# Patient Record
Sex: MALE | Race: WHITE | Employment: UNEMPLOYED | URBAN - METROPOLITAN AREA
[De-identification: names, ages, dates, MRNs, and addresses within clinical notes are randomized per-mention and may not be internally consistent; named-entity substitution may affect disease eponyms.]

---

## 2018-04-06 ENCOUNTER — RADIANT APPOINTMENT (OUTPATIENT)
Dept: GENERAL RADIOLOGY | Facility: CLINIC | Age: 58
End: 2018-04-06
Attending: FAMILY MEDICINE
Payer: COMMERCIAL

## 2018-04-06 ENCOUNTER — OFFICE VISIT (OUTPATIENT)
Dept: FAMILY MEDICINE | Facility: CLINIC | Age: 58
End: 2018-04-06
Payer: COMMERCIAL

## 2018-04-06 VITALS
HEART RATE: 72 BPM | OXYGEN SATURATION: 97 % | BODY MASS INDEX: 21.16 KG/M2 | SYSTOLIC BLOOD PRESSURE: 154 MMHG | RESPIRATION RATE: 20 BRPM | HEIGHT: 68 IN | WEIGHT: 139.6 LBS | DIASTOLIC BLOOD PRESSURE: 74 MMHG | TEMPERATURE: 97.2 F

## 2018-04-06 DIAGNOSIS — Z23 NEED FOR VACCINATION: ICD-10-CM

## 2018-04-06 DIAGNOSIS — Z12.11 SPECIAL SCREENING FOR MALIGNANT NEOPLASMS, COLON: ICD-10-CM

## 2018-04-06 DIAGNOSIS — R06.09 DOE (DYSPNEA ON EXERTION): ICD-10-CM

## 2018-04-06 DIAGNOSIS — Z13.6 CARDIOVASCULAR SCREENING; LDL GOAL LESS THAN 130: ICD-10-CM

## 2018-04-06 DIAGNOSIS — R06.09 DOE (DYSPNEA ON EXERTION): Primary | ICD-10-CM

## 2018-04-06 DIAGNOSIS — I10 ESSENTIAL HYPERTENSION WITH GOAL BLOOD PRESSURE LESS THAN 140/90: ICD-10-CM

## 2018-04-06 DIAGNOSIS — Z72.0 TOBACCO ABUSE: ICD-10-CM

## 2018-04-06 LAB
ALBUMIN UR-MCNC: NEGATIVE MG/DL
APPEARANCE UR: CLEAR
BILIRUB UR QL STRIP: NEGATIVE
COLOR UR AUTO: YELLOW
ERYTHROCYTE [DISTWIDTH] IN BLOOD BY AUTOMATED COUNT: 12.9 % (ref 10–15)
GLUCOSE UR STRIP-MCNC: NEGATIVE MG/DL
HCT VFR BLD AUTO: 44.6 % (ref 40–53)
HGB BLD-MCNC: 15.4 G/DL (ref 13.3–17.7)
HGB UR QL STRIP: ABNORMAL
KETONES UR STRIP-MCNC: NEGATIVE MG/DL
LEUKOCYTE ESTERASE UR QL STRIP: NEGATIVE
MCH RBC QN AUTO: 31.4 PG (ref 26.5–33)
MCHC RBC AUTO-ENTMCNC: 34.5 G/DL (ref 31.5–36.5)
MCV RBC AUTO: 91 FL (ref 78–100)
NITRATE UR QL: NEGATIVE
PH UR STRIP: 6.5 PH (ref 5–7)
PLATELET # BLD AUTO: 257 10E9/L (ref 150–450)
RBC # BLD AUTO: 4.9 10E12/L (ref 4.4–5.9)
RBC #/AREA URNS AUTO: NORMAL /HPF
SOURCE: ABNORMAL
SP GR UR STRIP: <=1.005 (ref 1–1.03)
UROBILINOGEN UR STRIP-ACNC: 0.2 EU/DL (ref 0.2–1)
WBC # BLD AUTO: 7.6 10E9/L (ref 4–11)
WBC #/AREA URNS AUTO: NORMAL /HPF

## 2018-04-06 PROCEDURE — 90715 TDAP VACCINE 7 YRS/> IM: CPT | Performed by: FAMILY MEDICINE

## 2018-04-06 PROCEDURE — 93000 ELECTROCARDIOGRAM COMPLETE: CPT | Performed by: FAMILY MEDICINE

## 2018-04-06 PROCEDURE — 85027 COMPLETE CBC AUTOMATED: CPT | Performed by: FAMILY MEDICINE

## 2018-04-06 PROCEDURE — 36415 COLL VENOUS BLD VENIPUNCTURE: CPT | Performed by: FAMILY MEDICINE

## 2018-04-06 PROCEDURE — 80053 COMPREHEN METABOLIC PANEL: CPT | Performed by: FAMILY MEDICINE

## 2018-04-06 PROCEDURE — 90471 IMMUNIZATION ADMIN: CPT | Performed by: FAMILY MEDICINE

## 2018-04-06 PROCEDURE — 99214 OFFICE O/P EST MOD 30 MIN: CPT | Mod: 25 | Performed by: FAMILY MEDICINE

## 2018-04-06 PROCEDURE — 71046 X-RAY EXAM CHEST 2 VIEWS: CPT | Mod: FY

## 2018-04-06 PROCEDURE — 80061 LIPID PANEL: CPT | Performed by: FAMILY MEDICINE

## 2018-04-06 PROCEDURE — 81001 URINALYSIS AUTO W/SCOPE: CPT | Performed by: FAMILY MEDICINE

## 2018-04-06 RX ORDER — MULTIPLE VITAMINS W/ MINERALS TAB 9MG-400MCG
1 TAB ORAL EVERY MORNING
Qty: 100 TABLET | Refills: 3 | COMMUNITY
Start: 2018-04-06

## 2018-04-06 RX ORDER — ALBUTEROL SULFATE 90 UG/1
2 AEROSOL, METERED RESPIRATORY (INHALATION) EVERY 4 HOURS PRN
Qty: 1 INHALER | Refills: 11 | Status: SHIPPED | OUTPATIENT
Start: 2018-04-06 | End: 2018-11-26

## 2018-04-06 RX ORDER — OMEGA-3 FATTY ACIDS/FISH OIL 300-1000MG
400 CAPSULE ORAL EVERY 4 HOURS PRN
Qty: 120 CAPSULE | Status: ON HOLD | COMMUNITY
Start: 2018-04-06 | End: 2021-01-01

## 2018-04-06 ASSESSMENT — PAIN SCALES - GENERAL: PAINLEVEL: NO PAIN (0)

## 2018-04-06 NOTE — MR AVS SNAPSHOT
After Visit Summary   4/6/2018    Camacho Covington    MRN: [id number]           Patient Information     Date Of Birth          1960        Visit Information        Provider Department      4/6/2018 11:20 AM Theo Ponce MD [facility]        Today's Diagnoses     RODRIGUEZ (dyspnea on exertion)    -  1    Essential hypertension with goal blood pressure less than 140/90        Special screening for malignant neoplasms, colon        CARDIOVASCULAR SCREENING; LDL GOAL LESS THAN 130        Tobacco abuse          Care Instructions    ASSESSMENT:   (R06.09) RODRIGUEZ (dyspnea on exertion)  (primary encounter diagnosis)  Comment: probable emphysema from years of smoking.  Lungs cannot repair themselves.  IF you continue to smoke, your breathing will get worse and this will kill you at an early age.   Plan: XR Chest 2 Views, General PFT Lab (Please         always keep checked), Pulmonary Function Test,         albuterol (PROAIR HFA) 108 (90 BASE) MCG/ACT         Inhaler          Schedule an appointment with respiratory therapy for pulmonary function tests.   Call [phone] to schedule.   Try the albuteral inhaler for breathing.  Albuteral inhaler can be used taking 2 inhalations every 4 hours as needed for coughing, wheezing or shortness of breath.     (I10) Essential hypertension with goal blood pressure less than 140/90  Comment: high today and has been high.  You may need medication to help keep blood pressure at good level(<140/90)  Plan: Comprehensive metabolic panel (BMP + Alb, Alk         Phos, ALT, AST, Total. Bili, TP), CBC with         platelets, *UA reflex to Microscopic and         Culture ([place] and [facility] (except         [place] and [place]), EKG 12-lead complete         w/read - Clinics          Check BP several times in the next few weeks.  This can be done at home, in clinic, at our pharmacy, at a store or by neighbor or relative with a blood pressure cuff.   You should be sitting and relaxed for several minutes when taking blood pressure.   If BP readings are persistently over 140/90, I recommend a clinic visit for further evaluation and treatment.  Normal blood pressure is 120/80.  Usually high blood pressure does not cause any symptoms but over time can lead to eye and kidney problems.  High blood pressure also increases the chances of having a heart attack or stroke.     Let us know if readings are often high, so we can help get your blood pressure under good control.     (Z12.11) Special screening for malignant neoplasms, colon  Comment:   Plan: Fecal colorectal cancer screen (FIT)       Complete FIT colon cancer screening test and send in the mail.      (Z13.6) CARDIOVASCULAR SCREENING; LDL GOAL LESS THAN 130  Comment:   Plan: Lipid panel reflex to direct LDL Non-fasting          (Z72.0) Tobacco abuse  Comment:   Plan: I recommended quitting smoking.  Reviewed various medications available to help quit smoking including Chantix, Zyban or nicotine replacement products.  Offered assistance in stopping smoking when needed.     Recheck in a month after the lung tests and recheck blood pressure.           Follow-ups after your visit        Future tests that were ordered for you today     Open Future Orders        Priority Expected Expires Ordered    General PFT Lab (Please always keep checked) Routine  4/6/2019 4/6/2018    Pulmonary Function Test Routine  4/6/2019 4/6/2018    Fecal colorectal cancer screen (FIT) Routine 4/27/2018 6/29/2018 4/6/2018            Who to contact     If you have questions or need follow up information about today's clinic visit or your schedule please contact [facility] directly at [phone].  Normal or non-critical lab and imaging results will be communicated to you by MyChart, letter or phone within 4 business days after the clinic has received the results. If you do not hear from us within 7 days, please contact the  "clinic through [employer] or phone. If you have a critical or abnormal lab result, we will notify you by phone as soon as possible.  Submit refill requests through [employer] or call your pharmacy and they will forward the refill request to us. Please allow 3 business days for your refill to be completed.          Additional Information About Your Visit        Care EveryWhere ID     This is your Care EveryWhere ID. This could be used by other organizations to access your [facility] medical records  [id number]        Your Vitals Were     Pulse Temperature Respirations Height Pulse Oximetry BMI (Body Mass Index)    72 97.2  F (36.2  C) (Tympanic) 20 5' 7.5\" (1.715 m) 97% 21.54 kg/m2       Blood Pressure from Last 3 Encounters:   04/06/18 154/74    Weight from Last 3 Encounters:   04/06/18 139 lb 9.6 oz (63.3 kg)              We Performed the Following     *UA reflex to Microscopic and Culture ([place] [facility] (except [place] and [place])     CBC with platelets     Comprehensive metabolic panel (BMP + Alb, Alk Phos, ALT, AST, Total. Bili, TP)     EKG 12-lead complete w/read - Clinics     Lipid panel reflex to direct LDL Non-fasting          Today's Medication Changes          These changes are accurate as of 4/6/18 12:46 PM.  If you have any questions, ask your nurse or doctor.               Start taking these medicines.        Dose/Directions    albuterol 108 (90 BASE) MCG/ACT Inhaler   Commonly known as:  PROAIR HFA   Used for:  RODRIGUEZ (dyspnea on exertion)   Started by:  Theo Ponce MD        Dose:  2 puff   Inhale 2 puffs into the lungs every 4 hours as needed for shortness of breath / dyspnea or wheezing   Quantity:  1 Inhaler   Refills:  11            Where to get your medicines      These medications were sent to [facility] [address] [address]     Phone:  [phone]     albuterol 108 (90 BASE) MCG/ACT Inhaler          "       Primary Care Provider Office Phone # Fax #    Theo Ponce [phone] [address] [place] [address]        Equal Access to Services     CASIE JANE : Hadchris hema brown angeli Tran, wajamilda luqadaha, qaybta kakushalda tyler, tiffany pardo lajuan diegosusi martel. So [facility] [phone].    ATENCIÓN: Si habla español, tiene a rg disposición servicios gratuitos de asistencia lingüística. Llame al [phone].    We comply with applicable federal civil rights laws and [place] laws. We do not discriminate on the basis of race, color, national origin, age, disability, sex, sexual orientation, or gender identity.            Thank you!     Thank you for choosing [facility]  for your care. Our goal is always to provide you with excellent care. Hearing back from our patients is one way we can continue to improve our services. Please take a few minutes to complete the written survey that you may receive in the mail after your visit with us. Thank you!             Your Updated Medication List - Protect others around you: Learn how to safely use, store and throw away your medicines at www.disposemymeds.org.          This list is accurate as of 4/6/18 12:46 PM.  Always use your most recent med list.                   Brand Name Dispense Instructions for use Diagnosis    albuterol 108 (90 BASE) MCG/ACT Inhaler    PROAIR HFA    1 Inhaler    Inhale 2 puffs into the lungs every 4 hours as needed for shortness of breath / dyspnea or wheezing    RODRIGUEZ (dyspnea on exertion)       aspirin 81 MG tablet     90 tablet    Take 1 tablet (81 mg) by mouth daily        ibuprofen 200 MG capsule     120 capsule    Take 200 mg by mouth every 4 hours as needed for fever        Multi-vitamin Tabs tablet     100 tablet    Take 1 tablet by mouth daily

## 2018-04-06 NOTE — PATIENT INSTRUCTIONS
ASSESSMENT:   (R06.09) RODRIGUEZ (dyspnea on exertion)  (primary encounter diagnosis)  Comment: probable emphysema from years of smoking.  Lungs cannot repair themselves.  IF you continue to smoke, your breathing will get worse and this will kill you at an early age.   Plan: XR Chest 2 Views, General PFT Lab (Please         always keep checked), Pulmonary Function Test,         albuterol (PROAIR HFA) 108 (90 BASE) MCG/ACT         Inhaler          Schedule an appointment with respiratory therapy for pulmonary function tests.   Call [phone] to schedule.   Try the albuteral inhaler for breathing.  Albuteral inhaler can be used taking 2 inhalations every 4 hours as needed for coughing, wheezing or shortness of breath.     (I10) Essential hypertension with goal blood pressure less than 140/90  Comment: high today and has been high.  You may need medication to help keep blood pressure at good level(<140/90)  Plan: Comprehensive metabolic panel (BMP + Alb, Alk         Phos, ALT, AST, Total. Bili, TP), CBC with         platelets, *UA reflex to Microscopic and         Culture ([facility] (except         [place] and [place]), EKG 12-lead complete         w/read - Clinics          Check BP several times in the next few weeks.  This can be done at home, in clinic, at our pharmacy, at a store or by neighbor or relative with a blood pressure cuff.  You should be sitting and relaxed for several minutes when taking blood pressure.   If BP readings are persistently over 140/90, I recommend a clinic visit for further evaluation and treatment.  Normal blood pressure is 120/80.  Usually high blood pressure does not cause any symptoms but over time can lead to eye and kidney problems.  High blood pressure also increases the chances of having a heart attack or stroke.     Let us know if readings are often high, so we can help get your blood pressure under good control.     (Z12.11) Special screening for malignant  neoplasms, colon  Comment:   Plan: Fecal colorectal cancer screen (FIT)       Complete FIT colon cancer screening test and send in the mail.      (Z13.6) CARDIOVASCULAR SCREENING; LDL GOAL LESS THAN 130  Comment:   Plan: Lipid panel reflex to direct LDL Non-fasting          (Z72.0) Tobacco abuse  Comment:   Plan: I recommended quitting smoking.  Reviewed various medications available to help quit smoking including Chantix, Zyban or nicotine replacement products.  Offered assistance in stopping smoking when needed.     Recheck in a month after the lung tests and recheck blood pressure.

## 2018-04-06 NOTE — PROGRESS NOTES
SUBJECTIVE:   Camacho Covington is a 57 year old male who presents to clinic today for the following health issues:  Chief Complaint   Patient presents with     Breathing Problem     Establish Care     No health care execpt for [employer]. Was told by his Chiropractor his blood pressure was elevated in his office.      Concern - Breathing   Onset: 3 yrs    Description:   SOB with exertion      Intensity: moderate    Progression of Symptoms:  Same to slightly worse over the past 2 years    Accompanying Signs & Symptoms:  Denies swelling or random fevers. No chest pain or pressure.    Previous history of similar problem:   no    Precipitating factors:   Worsened by: walking or stairs make him worse. Standing upright worse, leaning over better.    Alleviating factors:  Improved by: resting    Therapies Tried and outcome: none    Difficulty breathing.   He notes dyspnea on exertion.   Can walk 20-30 steps.  Can walk a flight of stairs slowly.   Onset of symptoms: past couple years.  Course of symptoms over time: gradually worse.   Little cough-come in AM.    No wheezing.   No chest pains.    No history of lung disease, asthma, COPD.  No history of allergies.     Problem 2: blood pressure.  High at chiropractor.  It was high at last [employer] physical years ago.     Occupation: [employer].     There is no problem list on file for this patient.   No chronic or significant illnesses in the past     History   Smoking Status     Current Every Day Smoker     Packs/day: 1.50     Types: Cigarettes   Smokeless Tobacco     Never Used    Smokes 1 and 1/2 ppd.   No alcohol.     ROS:General: POSITIVE for:, some decreased energy.  Weight stable.   ENT: No problems with ears, nose or throat.  No difficulty swallowing.  Resp: see above   CV: see above   GI: No nausea, vomiting,  heartburn, abdominal pain, diarrhea, constipation or change in bowel habits  : No urinary frequency or dysuria, bladder or kidney problems  Musculoskeletal: No  "significant muscle or joint pains  Psychiatric: No problems with anxiety, depression or mental health    OBJECTIVE:Blood pressure 154/74, pulse 72, temperature 97.2  F (36.2  C), temperature source Tympanic, resp. rate 20, height 5' 7.5\" (1.715 m), weight 139 lb 9.6 oz (63.3 kg), SpO2 97 %. BMI=Body mass index is 21.54 kg/(m^2).  GENERAL APPEARANCE ADULT: Alert, no acute distress  EYES: PERRL, EOM normal, conjunctiva and lids normal  NECK: No adenopathy,masses or thyromegaly  RESP: lungs clear to auscultation , prolonged expiratory phase, diminished breath sounds throughout  CV: normal rate, regular rhythm, no murmur or gallop  ABDOMEN: soft, no organomegaly, masses or tenderness  MS: extremities normal, no peripheral edema   CXR: Hyperinflation, low diaphragms, no infiltrate, by my interpretation, I independently visualized the xrays.   EKG NSR, unremarkable.     ASSESSMENT:   (R06.09) RODRIGUEZ (dyspnea on exertion)  (primary encounter diagnosis)  Comment: probable emphysema from years of smoking.  Lungs cannot repair themselves.  IF you continue to smoke, your breathing will get worse and this will kill you at an early age.   Plan: XR Chest 2 Views, General PFT Lab (Please         always keep checked), Pulmonary Function Test,         albuterol (PROAIR HFA) 108 (90 BASE) MCG/ACT         Inhaler          Schedule an appointment with respiratory therapy for pulmonary function tests.   Call [phone] to schedule.   Try the albuteral inhaler for breathing.  Albuteral inhaler can be used taking 2 inhalations every 4 hours as needed for coughing, wheezing or shortness of breath.     (I10) Essential hypertension with goal blood pressure less than 140/90  Comment: high today and has been high.  You may need medication to help keep blood pressure at good level(<140/90)  Plan: Comprehensive metabolic panel (BMP + Alb, Alk         Phos, ALT, AST, Total. Bili, TP), CBC with         platelets, *UA reflex to Microscopic and       "   Culture ([facility] (except         [place] and [place]), EKG 12-lead complete         w/read - Clinics          Check BP several times in the next few weeks.  This can be done at home, in clinic, at our pharmacy, at a store or by neighbor or relative with a blood pressure cuff.  You should be sitting and relaxed for several minutes when taking blood pressure.   If BP readings are persistently over 140/90, I recommend a clinic visit for further evaluation and treatment.  Normal blood pressure is 120/80.  Usually high blood pressure does not cause any symptoms but over time can lead to eye and kidney problems.  High blood pressure also increases the chances of having a heart attack or stroke.     Let us know if readings are often high, so we can help get your blood pressure under good control.     (Z12.11) Special screening for malignant neoplasms, colon  Comment:   Plan: Fecal colorectal cancer screen (FIT)       Complete FIT colon cancer screening test and send in the mail.      (Z13.6) CARDIOVASCULAR SCREENING; LDL GOAL LESS THAN 130  Comment:   Plan: Lipid panel reflex to direct LDL Non-fasting          (Z72.0) Tobacco abuse  Comment:   Plan: I recommended quitting smoking.  Reviewed various medications available to help quit smoking including Chantix, Zyban or nicotine replacement products.  Offered assistance in stopping smoking when needed.     Recheck in a month after the lung tests and recheck blood pressure.

## 2018-04-06 NOTE — NURSING NOTE
"Chief Complaint   Patient presents with     Breathing Problem       Initial /74  Pulse 72  Temp 97.2  F (36.2  C) (Tympanic)  Resp 20  Ht 5' 7.5\" (1.715 m)  Wt 139 lb 9.6 oz (63.3 kg)  SpO2 97%  BMI 21.54 kg/m2 Estimated body mass index is 21.54 kg/(m^2) as calculated from the following:    Height as of this encounter: 5' 7.5\" (1.715 m).    Weight as of this encounter: 139 lb 9.6 oz (63.3 kg).      Health Maintenance that is potentially due pending provider review:  NONE    Elevated blood pressure.    Is there anyone who you would like to be able to receive your results? Yes  If yes have patient fill out TEE    "

## 2018-04-06 NOTE — NURSING NOTE
Chief Complaint   Patient presents with     Breathing Problem     Establish Care     No health care execpt for [employer]. Was told by his Chiropractor his blood pressure was elevated in his office.     Imm/Inj       Prior to injection verified patient identity using patient's name and date of birth.  Due to injection administration, patient instructed to remain in clinic for 15 minutes  afterwards, and to report any adverse reaction to me immediately.

## 2018-04-07 LAB
ALBUMIN SERPL-MCNC: 4.2 G/DL (ref 3.4–5)
ALP SERPL-CCNC: 61 U/L (ref 40–150)
ALT SERPL W P-5'-P-CCNC: 19 U/L (ref 0–70)
ANION GAP SERPL CALCULATED.3IONS-SCNC: 7 MMOL/L (ref 3–14)
AST SERPL W P-5'-P-CCNC: 15 U/L (ref 0–45)
BILIRUB SERPL-MCNC: 0.4 MG/DL (ref 0.2–1.3)
BUN SERPL-MCNC: 10 MG/DL (ref 7–30)
CALCIUM SERPL-MCNC: 9.4 MG/DL (ref 8.5–10.1)
CHLORIDE SERPL-SCNC: 96 MMOL/L (ref 94–109)
CHOLEST SERPL-MCNC: 187 MG/DL
CO2 SERPL-SCNC: 28 MMOL/L (ref 20–32)
CREAT SERPL-MCNC: 0.56 MG/DL (ref 0.66–1.25)
GFR SERPL CREATININE-BSD FRML MDRD: >90 ML/MIN/1.7M2
GLUCOSE SERPL-MCNC: 82 MG/DL (ref 70–99)
HDLC SERPL-MCNC: 56 MG/DL
LDLC SERPL CALC-MCNC: 115 MG/DL
NONHDLC SERPL-MCNC: 131 MG/DL
POTASSIUM SERPL-SCNC: 4.4 MMOL/L (ref 3.4–5.3)
PROT SERPL-MCNC: 7.8 G/DL (ref 6.8–8.8)
SODIUM SERPL-SCNC: 131 MMOL/L (ref 133–144)
TRIGL SERPL-MCNC: 78 MG/DL

## 2018-04-09 ENCOUNTER — ALLIED HEALTH/NURSE VISIT (OUTPATIENT)
Dept: FAMILY MEDICINE | Facility: CLINIC | Age: 58
End: 2018-04-09
Payer: COMMERCIAL

## 2018-04-09 ENCOUNTER — TELEPHONE (OUTPATIENT)
Dept: FAMILY MEDICINE | Facility: CLINIC | Age: 58
End: 2018-04-09

## 2018-04-09 VITALS — SYSTOLIC BLOOD PRESSURE: 155 MMHG | DIASTOLIC BLOOD PRESSURE: 78 MMHG

## 2018-04-09 DIAGNOSIS — I10 ESSENTIAL HYPERTENSION WITH GOAL BLOOD PRESSURE LESS THAN 140/90: Primary | ICD-10-CM

## 2018-04-09 DIAGNOSIS — Z13.6 CARDIOVASCULAR SCREENING; LDL GOAL LESS THAN 130: Primary | ICD-10-CM

## 2018-04-09 PROCEDURE — 99207 ZZC NO CHARGE NURSE ONLY: CPT | Performed by: FAMILY MEDICINE

## 2018-04-09 RX ORDER — ATORVASTATIN CALCIUM 20 MG/1
20 TABLET, FILM COATED ORAL DAILY
Qty: 30 TABLET | Refills: 11 | Status: SHIPPED | OUTPATIENT
Start: 2018-04-09 | End: 2019-02-11

## 2018-04-09 NOTE — MR AVS SNAPSHOT
After Visit Summary   4/9/2018    Camacho Covington    MRN: [id number]           Patient Information     Date Of Birth          1960        Visit Information        Provider Department      4/9/2018 9:34 AM Theo Ponce MD [facility]        Today's Diagnoses     Essential hypertension with goal blood pressure less than 140/90    -  1       Follow-ups after your visit        Your next 10 appointments already scheduled     Apr 19, 2018  8:00 AM CDT   Pulmonary Function with [place] PULMONARY FUNCTION   [facility] ([facility])    [address] [address] [phone]           No Inhalers for 6 hours prior to test No Smoking 2 hours prior to test              Who to contact     If you have questions or need follow up information about today's clinic visit or your schedule please contact [facility] directly at [phone].  Normal or non-critical lab and imaging results will be communicated to you by MyChart, letter or phone within 4 business days after the clinic has received the results. If you do not hear from us within 7 days, please contact the clinic through MyChart or phone. If you have a critical or abnormal lab result, we will notify you by phone as soon as possible.  Submit refill requests through [employer] or call your pharmacy and they will forward the refill request to us. Please allow 3 business days for your refill to be completed.          Additional Information About Your Visit        Care EveryWhere ID     This is your Care EveryWhere ID. This could be used by other organizations to access your [facility] medical records  [id number]         Blood Pressure from Last 3 Encounters:   04/09/18 155/78   04/06/18 154/74    Weight from Last 3 Encounters:   04/06/18 139 lb 9.6 oz (63.3 kg)              Today, you had the following     No orders found for display         Today's Medication Changes           These changes are accurate as of 4/9/18 11:59 PM.  If you have any questions, ask your nurse or doctor.               Start taking these medicines.        Dose/Directions    atorvastatin 20 MG tablet   Commonly known as:  LIPITOR   Used for:  CARDIOVASCULAR SCREENING; LDL GOAL LESS THAN 130   Started by:  Theo Ponce MD        Dose:  20 mg   Take 1 tablet (20 mg) by mouth daily   Quantity:  30 tablet   Refills:  11            Where to get your medicines      These medications were sent to [facility] [place] [id number]     Phone:  [phone]     atorvastatin 20 MG tablet                Primary Care Provider Office Phone # Fax #    Theo Ponce [phone]       [address] [place] [address]        Equal Access to Services     CASIE JANE : Hadii aad ku hadasho Soomaali, waaxda luqadaha, qaybta kaalmada adeegyada, tiffany tse . So [facility] [phone].    ATENCIÓN: Si habla español, tiene a rg disposición servicios gratuitos de asistencia lingüística. Llame al [phone].    We comply with applicable federal civil rights laws and [place] laws. We do not discriminate on the basis of race, color, national origin, age, disability, sex, sexual orientation, or gender identity.            Thank you!     Thank you for choosing [facility]  for your care. Our goal is always to provide you with excellent care. Hearing back from our patients is one way we can continue to improve our services. Please take a few minutes to complete the written survey that you may receive in the mail after your visit with us. Thank you!             Your Updated Medication List - Protect others around you: Learn how to safely use, store and throw away your medicines at www.disposemymeds.org.          This list is accurate as of 4/9/18 11:59 PM.  Always use your most recent med  list.                   Brand Name Dispense Instructions for use Diagnosis    albuterol 108 (90 BASE) MCG/ACT Inhaler    PROAIR HFA    1 Inhaler    Inhale 2 puffs into the lungs every 4 hours as needed for shortness of breath / dyspnea or wheezing    RODRIGUEZ (dyspnea on exertion)       aspirin 81 MG tablet     90 tablet    Take 1 tablet (81 mg) by mouth daily        atorvastatin 20 MG tablet    LIPITOR    30 tablet    Take 1 tablet (20 mg) by mouth daily    CARDIOVASCULAR SCREENING; LDL GOAL LESS THAN 130       ibuprofen 200 MG capsule     120 capsule    Take 200 mg by mouth every 4 hours as needed for fever        Multi-vitamin Tabs tablet     100 tablet    Take 1 tablet by mouth daily

## 2018-04-09 NOTE — PROGRESS NOTES
"Please call.  Urine test is normal.  The blood chemistries (Basic metabolic panel) are all normal including electrolytes (salt balances in the blood), blood glucose, liver and kidney tests.  Sodium is a little low.  LDL (\"bad cholesterol\") is high.  This increases risk for cardiovascular disease (heart attacks and strokes).   Your blood counts including the white blood cells, red blood cells and platelets are all normal.     PLAN:New cholesterol guidelines (from AHA/ACC pooled risk calculation) estimates 10 year risk of having a heart attack at about 14.3%.  Any risk over 7.5% is considered significant and statin type medication is recommended to prevent heart attacks.   The biggest reduction in risk for heart attacks would come from quitting smoking.   He could also reduce risk by taking statin medication like atorvastatin 20mg daily.    We could do prescription if he is interested in taking cholesterol lowering medication.       The 10-year ASCVD risk score (Rian HORN Jr, et al., 2013) is: 14.3%    Values used to calculate the score:      Age: 57 years      Sex: Male      Is Non- : No      Diabetic: No      Tobacco smoker: Yes      Systolic Blood Pressure: 154 mmHg      Is BP treated: No      HDL Cholesterol: 56 mg/dL      Total Cholesterol: 187 mg/dL"

## 2018-04-09 NOTE — TELEPHONE ENCOUNTER
Called patient and relayed Dr. Ponce's message regarding lab results. Patient verbalized understanding and has no further questions.    Please sign new prescription.    Sarah Major MA

## 2018-04-09 NOTE — TELEPHONE ENCOUNTER
"Please call.  Urine test is normal.  The blood chemistries (Basic metabolic panel) are all normal including electrolytes (salt balances in the blood), blood glucose, liver and kidney tests.  Sodium is a little low.  LDL (\"bad cholesterol\") is high.  This increases risk for cardiovascular disease (heart attacks and strokes).   Your blood counts including the white blood cells, red blood cells and platelets are all normal.     PLAN:New cholesterol guidelines (from AHA/ACC pooled risk calculation) estimates 10 year risk of having a heart attack at about 14.3%.  Any risk over 7.5% is considered significant and statin type medication is recommended to prevent heart attacks.   The biggest reduction in risk for heart attacks would come from quitting smoking.   He could also reduce risk by taking statin medication like atorvastatin 20mg daily.    We could do prescription if he is interested in taking cholesterol lowering medication.   "

## 2018-04-09 NOTE — Clinical Note
Camacho Covington is enrolled/participating in the retail pharmacy Blood Pressure Goals Achievement Program (BPGAP). Camacho Covington was evaluated at [facility] on April 9, 2018 at which time his blood pressure was taken twice. The first BP reading was 160/78 and five minutes after was 155/78.   BP Readings from Last 3 Encounters: 04/09/18 155/78 04/06/18 154/74  Patient smoked cigarettes and had a cup of coffee this morning prior to having BP checked.   Camacho Covington is not experiencing symptoms.  Follow-Up: BP is not at goal of < 140/90mmHg (patient 18+ years of age with or without diabetes), Recommended follow-up with PCP.  Routing to PCP for further review. Patient stated provider requested him to come into pharmacy for a couple of blood pressure readings in between his last visit on 4/6/2018 and next follow-up visit  Recommendation to Provider: BP not at goal

## 2018-04-09 NOTE — PROGRESS NOTES
Camacho Covington is enrolled/participating in the retail pharmacy Blood Pressure Goals Achievement Program (BPGAP).  Camacho Covington was evaluated at [facility] on April 9, 2018 at which time his blood pressure was taken twice. The first BP reading was 160/78 and five minutes after was 155/78.     BP Readings from Last 3 Encounters:   04/09/18 155/78   04/06/18 154/74     Reviewed lifestyle modifications for blood pressure control and reduction: including making healthy food choices, managing weight, getting regular exercise, smoking cessation, reducing alcohol consumption, monitoring blood pressure regularly.     Patient smoked cigarettes and had a cup of coffee this morning prior to having BP checked.     Camacho Covington is not experiencing symptoms.    Follow-Up: BP is not at goal of < 140/90mmHg (patient 18+ years of age with or without diabetes), Recommended follow-up with PCP.  Routing to PCP for further review. Patient stated provider requested him to come into pharmacy for a couple of blood pressure readings in between his last visit on 4/6/2018 and next follow-up visit    Recommendation to Provider: BP not at goal    Camacho Covington was evaluated for enrollment into the PGEN study today.    Patient eligible for enrollment:  No  Patient interested in enrollment:  Unknown    Completed by:     Keara Murdock, Pharmacy Intern

## 2018-04-19 ENCOUNTER — ALLIED HEALTH/NURSE VISIT (OUTPATIENT)
Dept: FAMILY MEDICINE | Facility: CLINIC | Age: 58
End: 2018-04-19
Payer: COMMERCIAL

## 2018-04-19 ENCOUNTER — HOSPITAL ENCOUNTER (OUTPATIENT)
Dept: RESPIRATORY THERAPY | Facility: CLINIC | Age: 58
End: 2018-04-19
Attending: INTERNAL MEDICINE
Payer: COMMERCIAL

## 2018-04-19 VITALS — DIASTOLIC BLOOD PRESSURE: 72 MMHG | SYSTOLIC BLOOD PRESSURE: 155 MMHG

## 2018-04-19 DIAGNOSIS — I10 ESSENTIAL HYPERTENSION WITH GOAL BLOOD PRESSURE LESS THAN 140/90: Primary | ICD-10-CM

## 2018-04-19 DIAGNOSIS — R06.09 DOE (DYSPNEA ON EXERTION): ICD-10-CM

## 2018-04-19 PROCEDURE — 94729 DIFFUSING CAPACITY: CPT

## 2018-04-19 PROCEDURE — 94726 PLETHYSMOGRAPHY LUNG VOLUMES: CPT | Mod: 26 | Performed by: INTERNAL MEDICINE

## 2018-04-19 PROCEDURE — 94729 DIFFUSING CAPACITY: CPT | Mod: 26 | Performed by: INTERNAL MEDICINE

## 2018-04-19 PROCEDURE — 94726 PLETHYSMOGRAPHY LUNG VOLUMES: CPT

## 2018-04-19 PROCEDURE — 94060 EVALUATION OF WHEEZING: CPT

## 2018-04-19 PROCEDURE — 25000125 ZZHC RX 250: Performed by: FAMILY MEDICINE

## 2018-04-19 PROCEDURE — 94060 EVALUATION OF WHEEZING: CPT | Mod: 26 | Performed by: INTERNAL MEDICINE

## 2018-04-19 PROCEDURE — 99207 ZZC NO CHARGE NURSE ONLY: CPT | Performed by: FAMILY MEDICINE

## 2018-04-19 RX ORDER — ALBUTEROL SULFATE 0.83 MG/ML
2.5 SOLUTION RESPIRATORY (INHALATION) ONCE
Status: COMPLETED | OUTPATIENT
Start: 2018-04-19 | End: 2018-04-19

## 2018-04-19 RX ADMIN — ALBUTEROL SULFATE 2.5 MG: 2.5 SOLUTION RESPIRATORY (INHALATION) at 08:30

## 2018-04-19 NOTE — Clinical Note
Camacho Covington was evaluated at [facility] on April 19, 2018 at which time his blood pressure was:  BP Readings from Last 3 Encounters: 04/19/18 155/72 04/09/18 155/78 04/06/18 154/74  Pt stated he had a follow-up with provider coming up.   Reviewed lifestyle modifications for blood pressure control and reduction: including making healthy food choices, managing weight, getting regular exercise, smoking cessation, reducing alcohol consumption, monitoring blood pressure regularly.   Camacho Covington is not experiencing symptoms.  Follow-Up: BP is not at goal of < 140/90mmHg (patient 18+ years of age with or without diabetes), Recommended follow-up with PCP.  Routing to PCP for further review.

## 2018-04-19 NOTE — PROGRESS NOTES
Camacho Covington is enrolled/participating in the retail pharmacy Blood Pressure Goals Achievement Program (BPGAP).  Camacho Covington was evaluated at [facility] on April 19, 2018 at which time his blood pressure was:    BP Readings from Last 3 Encounters:   04/19/18 155/72   04/09/18 155/78   04/06/18 154/74     Pt stated he had a follow-up with provider coming up.     Reviewed lifestyle modifications for blood pressure control and reduction: including making healthy food choices, managing weight, getting regular exercise, smoking cessation, reducing alcohol consumption, monitoring blood pressure regularly.     Camacho Covington is not experiencing symptoms.    Follow-Up: BP is not at goal of < 140/90mmHg (patient 18+ years of age with or without diabetes), Recommended follow-up with PCP.  Routing to PCP for further review.    Recommendation to Provider: BP not at goal, follow up at next visit.     Camacho Covington was evaluated for enrollment into the PGEN study today.    Patient eligible for enrollment:  No  Patient interested in enrollment:  Unknown    Completed by:   Keara Murdock, Pharmacy Intern

## 2018-04-19 NOTE — MR AVS SNAPSHOT
After Visit Summary   4/19/2018    Camacho Covington    MRN: [id number]           Patient Information     Date Of Birth          1960        Visit Information        Provider Department      4/19/2018 11:30 AM Theo Ponce MD [facility]        Today's Diagnoses     Essential hypertension with goal blood pressure less than 140/90    -  1       Follow-ups after your visit        Who to contact     If you have questions or need follow up information about today's clinic visit or your schedule please contact [facility] directly at [phone].  Normal or non-critical lab and imaging results will be communicated to you by MyChart, letter or phone within 4 business days after the clinic has received the results. If you do not hear from us within 7 days, please contact the clinic through MyChart or phone. If you have a critical or abnormal lab result, we will notify you by phone as soon as possible.  Submit refill requests through [employer] or call your pharmacy and they will forward the refill request to us. Please allow 3 business days for your refill to be completed.          Additional Information About Your Visit        Care EveryWhere ID     This is your Care EveryWhere ID. This could be used by other organizations to access your [place] medical records  [id number]         Blood Pressure from Last 3 Encounters:   04/19/18 155/72   04/09/18 155/78   04/06/18 154/74    Weight from Last 3 Encounters:   04/06/18 139 lb 9.6 oz (63.3 kg)              Today, you had the following     No orders found for display       Primary Care Provider Office Phone # Fax #    Theo Ponce [phone] [address] [place] [address]        Equal Access to Services     [facility] BUCK : Hadii hema brown hadasho Sooracio, waaxda luqadaha, qaybta kaaltiffany jovel. [facility] [phone].    ATENCIÓN: Stoney holder  español, tiene a rg disposición servicios gratuitos de asistencia lingüística. Rachel monet [phone].    We comply with applicable federal civil rights laws and [place] laws. We do not discriminate on the basis of race, color, national origin, age, disability, sex, sexual orientation, or gender identity.            Thank you!     Thank you for choosing [facility]  for your care. Our goal is always to provide you with excellent care. Hearing back from our patients is one way we can continue to improve our services. Please take a few minutes to complete the written survey that you may receive in the mail after your visit with us. Thank you!             Your Updated Medication List - Protect others around you: Learn how to safely use, store and throw away your medicines at www.disposemymeds.org.          This list is accurate as of 4/19/18 11:33 AM.  Always use your most recent med list.                   Brand Name Dispense Instructions for use Diagnosis    albuterol 108 (90 Base) MCG/ACT Inhaler    PROAIR HFA    1 Inhaler    Inhale 2 puffs into the lungs every 4 hours as needed for shortness of breath / dyspnea or wheezing    RODRIGUEZ (dyspnea on exertion)       aspirin 81 MG tablet     90 tablet    Take 1 tablet (81 mg) by mouth daily        atorvastatin 20 MG tablet    LIPITOR    30 tablet    Take 1 tablet (20 mg) by mouth daily    CARDIOVASCULAR SCREENING; LDL GOAL LESS THAN 130       ibuprofen 200 MG capsule     120 capsule    Take 200 mg by mouth every 4 hours as needed for fever        Multi-vitamin Tabs tablet     100 tablet    Take 1 tablet by mouth daily

## 2018-04-23 PROBLEM — J43.9 PULMONARY EMPHYSEMA (H): Status: ACTIVE | Noted: 2018-04-06

## 2018-04-23 LAB
DLCOUNC-%PRED-PRE: 36 %
DLCOUNC-PRE: 9.95 ML/MIN/MMHG
DLCOUNC-PRED: 27.52 ML/MIN/MMHG
ERV-%PRED-PRE: 107 %
ERV-PRE: 1.49 L
ERV-PRED: 1.39 L
EXPTIME-PRE: 7.94 SEC
FEF2575-%PRED-POST: 18 %
FEF2575-%PRED-PRE: 11 %
FEF2575-POST: 0.53 L/SEC
FEF2575-PRE: 0.33 L/SEC
FEF2575-PRED: 2.86 L/SEC
FEFMAX-%PRED-PRE: 29 %
FEFMAX-PRE: 2.52 L/SEC
FEFMAX-PRED: 8.67 L/SEC
FEV1-%PRED-PRE: 23 %
FEV1-PRE: 0.77 L
FEV1FEV6-PRE: 35 %
FEV1FEV6-PRED: 79 %
FEV1FVC-PRE: 30 %
FEV1FVC-PRED: 76 %
FEV1SVC-PRE: 23 %
FEV1SVC-PRED: 74 %
FIFMAX-PRE: 1.76 L/SEC
FRCPLETH-%PRED-PRE: 277 %
FRCPLETH-PRE: 9.5 L
FRCPLETH-PRED: 3.42 L
FVC-%PRED-PRE: 59 %
FVC-PRE: 2.52 L
FVC-PRED: 4.23 L
IC-%PRED-PRE: 59 %
IC-PRE: 1.86 L
IC-PRED: 3.11 L
RVPLETH-%PRED-PRE: 349 %
RVPLETH-PRE: 7.99 L
RVPLETH-PRED: 2.29 L
TLCPLETH-%PRED-PRE: 174 %
TLCPLETH-PRE: 11.36 L
TLCPLETH-PRED: 6.52 L
VA-%PRED-PRE: 94 %
VA-PRE: 5.88 L
VC-%PRED-PRE: 74 %
VC-PRE: 3.36 L
VC-PRED: 4.5 L

## 2018-04-23 NOTE — PROGRESS NOTES
Please call.  He has severe emphysema on the pulmonary function tests.   PLAN: #1.  Quit smoking.  This has caused the emphysema and will continue to cause damage.   2. Albuteral inhaler can be used taking 2 inhalations every 4 hours as needed for coughing, wheezing or shortness of breath.   3. Depending upon how the inhaler is working, there is another daily medication that can help breathing.   If I can help him quit smoking, let me know.    Plan follow-up in a month to recheck breathing and see if other medication is needed.   KELLEY FERGUSON MD

## 2018-07-28 ENCOUNTER — ALLIED HEALTH/NURSE VISIT (OUTPATIENT)
Dept: FAMILY MEDICINE | Facility: CLINIC | Age: 58
End: 2018-07-28
Payer: COMMERCIAL

## 2018-07-28 VITALS — DIASTOLIC BLOOD PRESSURE: 76 MMHG | SYSTOLIC BLOOD PRESSURE: 150 MMHG

## 2018-07-28 DIAGNOSIS — I10 ESSENTIAL HYPERTENSION WITH GOAL BLOOD PRESSURE LESS THAN 140/90: Primary | ICD-10-CM

## 2018-07-28 PROCEDURE — 99207 ZZC NO CHARGE NURSE ONLY: CPT | Performed by: FAMILY MEDICINE

## 2018-07-28 NOTE — NURSING NOTE
Camacho Covington is enrolled/participating in the retail pharmacy Blood Pressure Goals Achievement Program (BPGAP).  Camacho Covington was evaluated at [facility] on July 28, 2018 at which time his blood pressure was:    BP Readings from Last 3 Encounters:   07/28/18 150/76   04/19/18 155/72   04/09/18 155/78     Reviewed lifestyle modifications for blood pressure control and reduction: including making healthy food choices, managing weight, getting regular exercise, smoking cessation, reducing alcohol consumption, monitoring blood pressure regularly.     Camacho Covington is not experiencing symptoms.    Follow-Up: BP is not at goal of < 140/90mmHg (patient 18+ years of age with or without diabetes), Recommended follow-up with PCP.  Routing to PCP for further review.    Recommendation to Provider: Camacho states he takes his BP medication every day - we discussed that he is not quite at his BP goal and he may be hearing from the clinic for next steps.    Camacho Covington was evaluated for enrollment into the PGEN study today.    Patient eligible for enrollment:  Unknown  Patient interested in enrollment:  Unknown    Completed by: Denisse Herzog, PharmD  [facility]  [phone]

## 2018-07-28 NOTE — MR AVS SNAPSHOT
"              After Visit Summary   2018    Camacho Covington    MRN: [id number]           Patient Information     Date Of Birth          1960        Visit Information        Provider Department      2018 9:22 AM Theo Ponce MD [facility]        Today's Diagnoses     Essential hypertension with goal blood pressure less than 140/90    -  1       Follow-ups after your visit        Who to contact     If you have questions or need follow up information about today's clinic visit or your schedule please contact [facility] directly at [phone].  Normal or non-critical lab and imaging results will be communicated to you by Likvahart, letter or phone within 4 business days after the clinic has received the results. If you do not hear from us within 7 days, please contact the clinic through [employer] or phone. If you have a critical or abnormal lab result, we will notify you by phone as soon as possible.  Submit refill requests through Uny[employer]e or call your pharmacy and they will forward the refill request to us. Please allow 3 business days for your refill to be completed.          Additional Information About Your Visit        MyChart Information     Unyqe lets you send messages to your doctor, view your test results, renew your prescriptions, schedule appointments and more. To sign up, go to [email or web address] . Click on \"Log in\" on the left side of the screen, which will take you to the Welcome page. Then click on \"Sign up Now\" on the right side of the page.     You will be asked to enter the access code listed below, as well as some personal information. Please follow the directions to create your username and password.     Your access code is: [email or web address]  Expires: 10/27/2018 11:49 AM     Your access code will  in 90 days. If you need help or a new code, please call your [place] clinic or [phone].        Care EveryWhere ID     This is your " Care EveryWhere ID. This could be used by other organizations to access your [place] medical records  [id number]         Blood Pressure from Last 3 Encounters:   07/28/18 150/76   04/19/18 155/72   04/09/18 155/78    Weight from Last 3 Encounters:   04/06/18 139 lb 9.6 oz (63.3 kg)              Today, you had the following     No orders found for display       Primary Care Provider Office Phone # Fax #    Theo Ponce [phone] [address] [place] [address]        Equal Access to Services     [facility]: Hadii aad ku hadasho Soomaali, waaxda luqadaha, qaybta kaalmada adeangelesyada, tiffany tse . So [facility] [phone].    ATENCIÓN: Si habla español, tiene a rg disposición servicios gratuitos de asistencia lingüística. Llame al [phone].    We comply with applicable federal civil rights laws and [place] laws. We do not discriminate on the basis of race, color, national origin, age, disability, sex, sexual orientation, or gender identity.            Thank you!     Thank you for choosing [facility]  for your care. Our goal is always to provide you with excellent care. Hearing back from our patients is one way we can continue to improve our services. Please take a few minutes to complete the written survey that you may receive in the mail after your visit with us. Thank you!             Your Updated Medication List - Protect others around you: Learn how to safely use, store and throw away your medicines at www.disposemymeds.org.          This list is accurate as of 7/28/18 11:59 PM.  Always use your most recent med list.                   Brand Name Dispense Instructions for use Diagnosis    albuterol 108 (90 Base) MCG/ACT Inhaler    PROAIR HFA    1 Inhaler    Inhale 2 puffs into the lungs every 4 hours as needed for shortness of breath / dyspnea or wheezing    RODRIGUEZ (dyspnea on exertion)       aspirin 81 MG tablet     90 tablet     Take 1 tablet (81 mg) by mouth daily        atorvastatin 20 MG tablet    LIPITOR    30 tablet    Take 1 tablet (20 mg) by mouth daily    CARDIOVASCULAR SCREENING; LDL GOAL LESS THAN 130       ibuprofen 200 MG capsule     120 capsule    Take 200 mg by mouth every 4 hours as needed for fever        Multi-vitamin Tabs tablet     100 tablet    Take 1 tablet by mouth daily

## 2018-07-29 NOTE — PROGRESS NOTES
Please call.  He is due for follow-up visit for both blood pressure and emphysema.   KELLEY FERGUSON MD

## 2018-07-30 ENCOUNTER — TELEPHONE (OUTPATIENT)
Dept: FAMILY MEDICINE | Facility: CLINIC | Age: 58
End: 2018-07-30

## 2018-07-30 NOTE — TELEPHONE ENCOUNTER
advised appointment for blood pressure and emphysema follow up.  I spoke to austin and he'll get back to us for appointment.    Sue Rapp,PAUL

## 2018-11-26 ENCOUNTER — OFFICE VISIT (OUTPATIENT)
Dept: FAMILY MEDICINE | Facility: CLINIC | Age: 58
End: 2018-11-26
Payer: COMMERCIAL

## 2018-11-26 VITALS
WEIGHT: 134 LBS | SYSTOLIC BLOOD PRESSURE: 180 MMHG | HEART RATE: 72 BPM | BODY MASS INDEX: 20.31 KG/M2 | TEMPERATURE: 98.2 F | HEIGHT: 68 IN | OXYGEN SATURATION: 95 % | RESPIRATION RATE: 20 BRPM | DIASTOLIC BLOOD PRESSURE: 84 MMHG

## 2018-11-26 DIAGNOSIS — J43.9 PULMONARY EMPHYSEMA, UNSPECIFIED EMPHYSEMA TYPE (H): Primary | ICD-10-CM

## 2018-11-26 DIAGNOSIS — Z23 NEED FOR PROPHYLACTIC VACCINATION AND INOCULATION AGAINST INFLUENZA: ICD-10-CM

## 2018-11-26 DIAGNOSIS — Z72.0 TOBACCO ABUSE: ICD-10-CM

## 2018-11-26 DIAGNOSIS — I10 ESSENTIAL HYPERTENSION WITH GOAL BLOOD PRESSURE LESS THAN 140/90: ICD-10-CM

## 2018-11-26 PROCEDURE — 99214 OFFICE O/P EST MOD 30 MIN: CPT | Mod: 25 | Performed by: FAMILY MEDICINE

## 2018-11-26 PROCEDURE — 90471 IMMUNIZATION ADMIN: CPT | Performed by: FAMILY MEDICINE

## 2018-11-26 PROCEDURE — 90682 RIV4 VACC RECOMBINANT DNA IM: CPT | Performed by: FAMILY MEDICINE

## 2018-11-26 RX ORDER — ALBUTEROL SULFATE 90 UG/1
2 AEROSOL, METERED RESPIRATORY (INHALATION) EVERY 4 HOURS PRN
Qty: 1 INHALER | Refills: 11 | Status: SHIPPED | OUTPATIENT
Start: 2018-11-26 | End: 2019-07-03

## 2018-11-26 RX ORDER — LISINOPRIL 20 MG/1
20 TABLET ORAL DAILY
Qty: 30 TABLET | Refills: 1 | Status: SHIPPED | OUTPATIENT
Start: 2018-11-26 | End: 2019-01-21

## 2018-11-26 ASSESSMENT — PAIN SCALES - GENERAL: PAINLEVEL: NO PAIN (0)

## 2018-11-26 NOTE — MR AVS SNAPSHOT
After Visit Summary   11/26/2018    Camacho Covington    MRN: [id number]           Patient Information     Date Of Birth          1960        Visit Information        Provider Department      11/26/2018 1:40 PM Theo Ponce MD [facility]        Today's Diagnoses     Pulmonary emphysema, unspecified emphysema type (H)    -  1    Tobacco abuse        Need for prophylactic vaccination and inoculation against influenza        Essential hypertension with goal blood pressure less than 140/90          Care Instructions    ASSESSMENT:   (J43.9) Pulmonary emphysema, unspecified emphysema type (H)  (primary encounter diagnosis)  Comment: severe emphysema.   Plan: albuterol (PROAIR HFA) 108 (90 Base) MCG/ACT         inhaler, umeclidinium (INCRUSE ELLIPTA) 62.5         MCG/INH inhaler          Continue albuteral inhaler as needed.    Albuteral inhaler can be used taking 2 inhalations every 4 hours as needed for coughing, wheezing or shortness of breath.   Add umeclidinium (Incruse ellipta) inhaler once daily to lessen COPD (emphysema).  Quitting smoking is most important in decreasing lung damage.     (Z72.0) Tobacco abuse  Comment: interested in quitting  Plan: varenicline (CHANTIX STARTING MONTH PAK) 0.5 MG        X 11 & 1 MG X 42 tablet        Reviewed potential side effects of Chantix including nausea and GI symptoms.  Also potential for abnormal dreams, suicidal thoughts  and psychiatric symptoms.   Instructions in use given with gradually increasing dose over the first week, 0.5 mg daily for 3 days, then twice daily for 4 days, then increase to 1 mg pills and take twice daily.  Start medication 1 week before stopping smoking.  Expect to use for 12 weeks and if successful in quitting, to continue use for another 12 weeks.  If not off cigarettes in 12 weeks, stop the medication and consider other options for quitting.     (Z23) Need for prophylactic vaccination and inoculation  against influenza  Comment:   Plan: FLU VACCINE, (RIV4) RECOMBINANT HA  , IM         (FluBlok, egg free) [44217]- >18 YRS ([facility]         recommended  50-64 YRS), Vaccine         Administration, Initial [49816]        Flu shot today.     (I10) Essential hypertension with goal blood pressure less than 140/90  Comment: consistently high  Plan: lisinopril (PRINIVIL/ZESTRIL) 20 MG tablet        I have reviewed possible cause for hypertension (high blood pressure), the most common one being essential hypertension (hypertension of unknown cause) in up to 90% of people.  I recommended evaluation to make sure there is no reversible cause for the hypertension.    Start medication at this time to control BP- lisinopril 20mg once daily.     Potential common and serious side effects discussed.    Reviewed lifestyle changes which could help in lowering BP, in particular, quitting smoking    Monitor BP with goal of <140/90    Recheck in 2 weeks with clinic RN    Contact me earlier with side effects or problems with medication.          Follow-ups after your visit        Follow-up notes from your care team     Return in about 2 weeks (around 12/10/2018).      Who to contact     If you have questions or need follow up information about today's clinic visit or your schedule please contact [facility] directly at [phone].  Normal or non-critical lab and imaging results will be communicated to you by goActhart, letter or phone within 4 business days after the clinic has received the results. If you do not hear from us within 7 days, please contact the clinic through goActhart or phone. If you have a critical or abnormal lab result, we will notify you by phone as soon as possible.  Submit refill requests through [employer] or call your pharmacy and they will forward the refill request to us. Please allow 3 business days for your refill to be completed.          Additional Information About Your Visit        Care EveryWhere  "ID     This is your Care EveryWhere ID. This could be used by other organizations to access your [place] medical records  [id number]        Your Vitals Were     Pulse Temperature Respirations Height Pulse Oximetry BMI (Body Mass Index)    72 98.2  F (36.8  C) (Tympanic) 20 5' 7.5\" (1.715 m) 95% 20.68 kg/m2       Blood Pressure from Last 3 Encounters:   11/26/18 180/84   07/28/18 150/76   04/19/18 155/72    Weight from Last 3 Encounters:   11/26/18 134 lb (60.8 kg)   04/06/18 139 lb 9.6 oz (63.3 kg)              We Performed the Following     FLU VACCINE, (RIV4) RECOMBINANT HA  , IM (FluBlok, egg free) [36840]- >18 YRS ([facility] recommended  50-64 YRS)     Vaccine Administration, Initial [93816]          Today's Medication Changes          These changes are accurate as of 11/26/18  2:34 PM.  If you have any questions, ask your nurse or doctor.               Start taking these medicines.        Dose/Directions    lisinopril 20 MG tablet   Commonly known as:  PRINIVIL/ZESTRIL   Used for:  Essential hypertension with goal blood pressure less than 140/90   Started by:  Theo Ponce MD        Dose:  20 mg   Take 1 tablet (20 mg) by mouth daily   Quantity:  30 tablet   Refills:  1       umeclidinium 62.5 MCG/INH inhaler   Commonly known as:  INCRUSE ELLIPTA   Used for:  Pulmonary emphysema, unspecified emphysema type (H)   Started by:  Theo Ponce MD        Dose:  1 puff   Inhale 1 puff into the lungs daily   Quantity:  30 each   Refills:  11       varenicline 0.5 MG X 11 & 1 MG X 42 tablet   Commonly known as:  CHANTIX STARTING MONTH PAK   Used for:  Tobacco abuse   Started by:  Theo Ponce MD        0.5 mg daily for 3 days, then twice daily for 4 days, then increase to 1 mg pills and take twice daily.   Quantity:  53 tablet   Refills:  5            Where to get your medicines      These medications were sent to [facility], [address], " [facility] [address]     Phone:  [phone]     albuterol 108 (90 Base) MCG/ACT inhaler    lisinopril 20 MG tablet    umeclidinium 62.5 MCG/INH inhaler    varenicline 0.5 MG X 11 & 1 MG X 42 tablet                Primary Care Provider Office Phone # Fax #    Theo Ponce [phone] [address] [facility] [address]        Equal Access to Services     CASIE JANE : Hadii aad ku hadasho Soomaali, waaxda luqadaha, qaybta kaalmada adeegyada, waxay idiin hayaan adeeg kharash la'yao . So [facility] [phone].    ATENCIÓN: Si habla español, tiene a rg disposición servicios gratuitos de asistencia lingüística. [facility] [phone].    We comply with applicable federal civil rights laws and [place] laws. We do not discriminate on the basis of race, color, national origin, age, disability, sex, sexual orientation, or gender identity.            Thank you!     Thank you for choosing [facility]  for your care. Our goal is always to provide you with excellent care. Hearing back from our patients is one way we can continue to improve our services. Please take a few minutes to complete the written survey that you may receive in the mail after your visit with us. Thank you!             Your Updated Medication List - Protect others around you: Learn how to safely use, store and throw away your medicines at www.disposemymeds.org.          This list is accurate as of 11/26/18  2:34 PM.  Always use your most recent med list.                   Brand Name Dispense Instructions for use Diagnosis    albuterol 108 (90 Base) MCG/ACT inhaler    PROAIR HFA    1 Inhaler    Inhale 2 puffs into the lungs every 4 hours as needed for shortness of breath / dyspnea or wheezing    Pulmonary emphysema, unspecified emphysema type (H)       aspirin 81 MG tablet    ASA    90 tablet    Take 1 tablet (81 mg) by mouth daily        atorvastatin 20 MG tablet    LIPITOR    30 tablet    Take 1 tablet (20  mg) by mouth daily    CARDIOVASCULAR SCREENING; LDL GOAL LESS THAN 130       ibuprofen 200 MG capsule    ADVIL/MOTRIN    120 capsule    Take 200 mg by mouth every 4 hours as needed for fever        lisinopril 20 MG tablet    PRINIVIL/ZESTRIL    30 tablet    Take 1 tablet (20 mg) by mouth daily    Essential hypertension with goal blood pressure less than 140/90       Multi-vitamin Tabs tablet     100 tablet    Take 1 tablet by mouth daily        umeclidinium 62.5 MCG/INH inhaler    INCRUSE ELLIPTA    30 each    Inhale 1 puff into the lungs daily    Pulmonary emphysema, unspecified emphysema type (H)       varenicline 0.5 MG X 11 & 1 MG X 42 tablet    CHANTIX STARTING MONTH MELINA    53 tablet    0.5 mg daily for 3 days, then twice daily for 4 days, then increase to 1 mg pills and take twice daily.    Tobacco abuse

## 2018-11-26 NOTE — NURSING NOTE
"Chief Complaint   Patient presents with     COPD     Emphysema     Hypertension       Initial /84  Pulse 72  Temp 98.2  F (36.8  C) (Tympanic)  Resp 20  Ht 5' 7.5\" (1.715 m)  Wt 134 lb (60.8 kg)  BMI 20.68 kg/m2 Estimated body mass index is 20.68 kg/(m^2) as calculated from the following:    Height as of this encounter: 5' 7.5\" (1.715 m).    Weight as of this encounter: 134 lb (60.8 kg).    Patient presents to the clinic using     Health Maintenance that is potentially due pending provider review: BP elevated        Is there anyone who you would like to be able to receive your results? If yes have patient fill out TEE    "

## 2018-11-26 NOTE — PATIENT INSTRUCTIONS
ASSESSMENT:   (J43.9) Pulmonary emphysema, unspecified emphysema type (H)  (primary encounter diagnosis)  Comment: severe emphysema.   Plan: albuterol (PROAIR HFA) 108 (90 Base) MCG/ACT         inhaler, umeclidinium (INCRUSE ELLIPTA) 62.5         MCG/INH inhaler          Continue albuteral inhaler as needed.    Albuteral inhaler can be used taking 2 inhalations every 4 hours as needed for coughing, wheezing or shortness of breath.   Add umeclidinium (Incruse ellipta) inhaler once daily to lessen COPD (emphysema).  Quitting smoking is most important in decreasing lung damage.     (Z72.0) Tobacco abuse  Comment: interested in quitting  Plan: varenicline (CHANTIX STARTING MONTH PAK) 0.5 MG        X 11 & 1 MG X 42 tablet        Reviewed potential side effects of Chantix including nausea and GI symptoms.  Also potential for abnormal dreams, suicidal thoughts  and psychiatric symptoms.   Instructions in use given with gradually increasing dose over the first week, 0.5 mg daily for 3 days, then twice daily for 4 days, then increase to 1 mg pills and take twice daily.  Start medication 1 week before stopping smoking.  Expect to use for 12 weeks and if successful in quitting, to continue use for another 12 weeks.  If not off cigarettes in 12 weeks, stop the medication and consider other options for quitting.     (Z23) Need for prophylactic vaccination and inoculation against influenza  Comment:   Plan: FLU VACCINE, (RIV4) RECOMBINANT HA  , IM         (FluBlok, egg free) [22363]- >18 YRS (G         recommended  50-64 YRS), Vaccine         Administration, Initial [32046]        Flu shot today.     (I10) Essential hypertension with goal blood pressure less than 140/90  Comment: consistently high  Plan: lisinopril (PRINIVIL/ZESTRIL) 20 MG tablet        I have reviewed possible cause for hypertension (high blood pressure), the most common one being essential hypertension (hypertension of unknown cause) in up to 90% of people.   I recommended evaluation to make sure there is no reversible cause for the hypertension.    Start medication at this time to control BP- lisinopril 20mg once daily.     Potential common and serious side effects discussed.    Reviewed lifestyle changes which could help in lowering BP, in particular, quitting smoking    Monitor BP with goal of <140/90    Recheck in 2 weeks with clinic RN    Contact me earlier with side effects or problems with medication.

## 2018-11-26 NOTE — PROGRESS NOTES
SUBJECTIVE:   Camacho Covington is a 58 year old male who presents to clinic today for the following health issues:  Chief Complaint   Patient presents with     COPD     Emphysema     Hypertension      He is in to get blood pressure treated.  He has not been on medication in the past for blood pressure.  Consistently high.    Hypertension Follow-up      Outpatient blood pressures are not being checked.    Low Salt Diet: low salt    Emphysema  Follow-Up    Symptoms are currently: Has troubles with the phelgm in the morning to cough it up.     Current fatigue or dyspnea with ambulation: stable     Shortness of breath: stable    Increased or change in Cough/Sputum: No    Fever(s): No    Baseline ambulation without stopping to rest: 0  blocks. Able to walk up 0 flights of stairs without stopping to rest.    Any ER/UC or hospital admissions since your last visit? No     He has albuteral inhaler. He has been using every 4 hours.   Some phlegm after awakening.     Problem 3: tobacco use.   History   Smoking Status     Current Every Day Smoker     Packs/day: 1.50     Years: 44.00     Types: Cigarettes   Smokeless Tobacco     Never Used     Comment: started at age 13   Smoking 1 and 1/2 ppd.   Would like to quit.   Interested in Chantix.    He has tried gum in the past.  Did not seem to help.     No results found for: FEV1, LWB6GPS    Amount of exercise or physical activity:     Problems taking medications regularly: No    Medication side effects:     ROS:General: POSITIVE for:, some low energy  CV: No chest pains or palpitations  GI: No nausea, vomiting,  heartburn, abdominal pain, diarrhea, constipation or change in bowel habits  : No urinary frequency or dysuria, bladder or kidney problems  Musculoskeletal: No significant muscle or joint pains  Psychiatric: No problems with anxiety, depression or mental health    OBJECTIVE:Blood pressure 180/84, pulse 72, temperature 98.2  F (36.8  C), temperature source Tympanic, resp.  "rate 20, height 5' 7.5\" (1.715 m), weight 134 lb (60.8 kg), SpO2 95 %. BMI=Body mass index is 20.68 kg/(m^2).  GENERAL APPEARANCE ADULT: Alert, no acute distress     ASSESSMENT:   (J43.9) Pulmonary emphysema, unspecified emphysema type (H)  (primary encounter diagnosis)  Comment: severe emphysema.   Plan: albuterol (PROAIR HFA) 108 (90 Base) MCG/ACT         inhaler, umeclidinium (INCRUSE ELLIPTA) 62.5         MCG/INH inhaler          Continue albuteral inhaler as needed.    Albuteral inhaler can be used taking 2 inhalations every 4 hours as needed for coughing, wheezing or shortness of breath.   Add umeclidinium (Incruse ellipta) inhaler once daily to lessen COPD (emphysema).  Quitting smoking is most important in decreasing lung damage.     (Z72.0) Tobacco abuse  Comment: interested in quitting  Plan: varenicline (CHANTIX STARTING MONTH PAK) 0.5 MG        X 11 & 1 MG X 42 tablet        Reviewed potential side effects of Chantix including nausea and GI symptoms.  Also potential for abnormal dreams, suicidal thoughts  and psychiatric symptoms.   Instructions in use given with gradually increasing dose over the first week, 0.5 mg daily for 3 days, then twice daily for 4 days, then increase to 1 mg pills and take twice daily.  Start medication 1 week before stopping smoking.  Expect to use for 12 weeks and if successful in quitting, to continue use for another 12 weeks.  If not off cigarettes in 12 weeks, stop the medication and consider other options for quitting.     (Z23) Need for prophylactic vaccination and inoculation against influenza  Comment:   Plan: FLU VACCINE, (RIV4) RECOMBINANT HA  , IM         (FluBlok, egg free) [31870]- >18 YRS (G         recommended  50-64 YRS), Vaccine         Administration, Initial [86090]        Flu shot today.     (I10) Essential hypertension with goal blood pressure less than 140/90  Comment: consistently high  Plan: lisinopril (PRINIVIL/ZESTRIL) 20 MG tablet        I have " reviewed possible cause for hypertension (high blood pressure), the most common one being essential hypertension (hypertension of unknown cause) in up to 90% of people.  I recommended evaluation to make sure there is no reversible cause for the hypertension.    Start medication at this time to control BP- lisinopril 20mg once daily.     Potential common and serious side effects discussed.    Reviewed lifestyle changes which could help in lowering BP, in particular, quitting smoking    Monitor BP with goal of <140/90    Recheck in 2 weeks with clinic RN    Contact me earlier with side effects or problems with medication.

## 2018-12-13 ENCOUNTER — ALLIED HEALTH/NURSE VISIT (OUTPATIENT)
Dept: FAMILY MEDICINE | Facility: CLINIC | Age: 58
End: 2018-12-13
Payer: COMMERCIAL

## 2018-12-13 VITALS — RESPIRATION RATE: 16 BRPM | DIASTOLIC BLOOD PRESSURE: 72 MMHG | HEART RATE: 82 BPM | SYSTOLIC BLOOD PRESSURE: 152 MMHG

## 2018-12-13 DIAGNOSIS — Z01.30 BP CHECK: ICD-10-CM

## 2018-12-13 DIAGNOSIS — I10 ESSENTIAL HYPERTENSION WITH GOAL BLOOD PRESSURE LESS THAN 140/90: Primary | ICD-10-CM

## 2018-12-13 PROCEDURE — 99207 ZZC NO CHARGE NURSE ONLY: CPT

## 2018-12-13 RX ORDER — HYDROCHLOROTHIAZIDE 12.5 MG/1
12.5 TABLET ORAL DAILY
Qty: 30 TABLET | Refills: 1 | Status: SHIPPED | OUTPATIENT
Start: 2018-12-13 | End: 2018-12-27 | Stop reason: DRUGHIGH

## 2018-12-13 NOTE — PROGRESS NOTES
Camacho Covington is a 58 year old year old patient who comes in today for a Blood Pressure check because of new medication and ongoing blood pressure monitoring.  Vital Signs as repeated by /68 and 152/72  Patient is taking medication as prescribed  Patient is tolerating medications well.  Patient is not monitoring Blood Pressure at home.    Current complaints: has felt pulse in right ear 3 days after starting Chantix  Disposition:  huddled with provider (Dr Ponce).  1. Add HCTZ 12.5 mg  2. See clinic RN in 1-2 weeks   3.  Needs potassium with BP check.    I called Camacho to give him this information    Kusum Stearns RN

## 2018-12-27 ENCOUNTER — TELEPHONE (OUTPATIENT)
Dept: FAMILY MEDICINE | Facility: CLINIC | Age: 58
End: 2018-12-27

## 2018-12-27 ENCOUNTER — ALLIED HEALTH/NURSE VISIT (OUTPATIENT)
Dept: FAMILY MEDICINE | Facility: CLINIC | Age: 58
End: 2018-12-27
Payer: COMMERCIAL

## 2018-12-27 VITALS — HEART RATE: 76 BPM | DIASTOLIC BLOOD PRESSURE: 70 MMHG | SYSTOLIC BLOOD PRESSURE: 148 MMHG | OXYGEN SATURATION: 98 %

## 2018-12-27 DIAGNOSIS — I10 ESSENTIAL HYPERTENSION WITH GOAL BLOOD PRESSURE LESS THAN 140/90: Primary | ICD-10-CM

## 2018-12-27 DIAGNOSIS — I10 ESSENTIAL HYPERTENSION WITH GOAL BLOOD PRESSURE LESS THAN 140/90: ICD-10-CM

## 2018-12-27 LAB — POTASSIUM SERPL-SCNC: 4.4 MMOL/L (ref 3.4–5.3)

## 2018-12-27 PROCEDURE — 84132 ASSAY OF SERUM POTASSIUM: CPT | Performed by: FAMILY MEDICINE

## 2018-12-27 PROCEDURE — 99207 ZZC NO CHARGE NURSE ONLY: CPT

## 2018-12-27 PROCEDURE — 36415 COLL VENOUS BLD VENIPUNCTURE: CPT | Performed by: FAMILY MEDICINE

## 2018-12-27 RX ORDER — HYDROCHLOROTHIAZIDE 25 MG/1
25 TABLET ORAL DAILY
Qty: 90 TABLET | Refills: 0 | Status: SHIPPED | OUTPATIENT
Start: 2018-12-27 | End: 2019-02-11

## 2018-12-27 NOTE — PROGRESS NOTES
"Camacho Covington is a 58 year old year old patient who comes in today for a Blood Pressure check because of hydrochlorothiazide added 12/13/18.  Vital Signs as repeated by RN:    Vitals:    12/27/18 0905 12/27/18 0908   BP: 138/76 148/70   BP Location: Left arm    Patient Position: Sitting    Cuff Size: Adult Regular    Pulse: 76    SpO2:  98%     Patient is taking medication as prescribed  Patient is tolerating medications well.  Patient is not monitoring Blood Pressure at home.    Had K+ drawn today - in process.    Current complaints: Urinating more \"pretty steady,\" since the addition of hydrochlorothiazide. Also drinks coffee daily, water and Gatorade.   Disposition:  Routed to Dr. Ponce as new med was added and lab done today.    Marcie ROCHA RN      "

## 2018-12-27 NOTE — LETTER
December 28, 2018      Camacho CURIEL Adria  [address]  [place] [address]        Dear ,    We are writing to inform you of your test results.    Serum potassium, one of the salts in the blood is in normal balance.     Resulted Orders   Potassium   Result Value Ref Range    Potassium 4.4 3.4 - 5.3 mmol/L       If you have any questions or concerns, please call the clinic at the number listed above.       Sincerely,        Theo Ponce MD/[facility]

## 2018-12-27 NOTE — TELEPHONE ENCOUNTER
Please call .    BP borderline but improved.    PLAN: Increase the hydrochlorothiazide to 25mg daily.  He can take two of the 12.5mg pills until gone  We can send new prescription for 25mg pills.   REcheck blood pressure in a month.  KELLEY FERGUSON MD

## 2018-12-27 NOTE — TELEPHONE ENCOUNTER
"Camacho Covington is a 58 year old year old patient who comes in today for a Blood Pressure check because of hydrochlorothiazide added 12/13/18.  Vital Signs as repeated by RN (both BPs done on left arm, sitting position, regular cuff):    Vitals:    12/27/18 0905 12/27/18 0908   BP: 138/76 148/70   BP Location: Left arm    Patient Position: Sitting    Cuff Size: Adult Regular    Pulse: 76    SpO2:  98%     Patient is taking medication as prescribed  Patient is tolerating medications well.  Patient is not monitoring Blood Pressure at home.    Had K+ drawn today - in process.    Current complaints: Urinating more \"pretty steady,\" since the addition of hydrochlorothiazide. Also drinks coffee daily, water and Gatorade.   Disposition:  Routed to Dr. Ponce as new med was added and lab done today.    Marcie ROCHA RN    "

## 2018-12-27 NOTE — TELEPHONE ENCOUNTER
Pt notified and expressed understanding and agreement with plan. Rx for hydrochlorothiazide 25mg tablets sent to [facility]. He will RTC in one month for BP recheck. K+ lab still in process.     Marcie ROCHA RN

## 2019-01-21 DIAGNOSIS — I10 ESSENTIAL HYPERTENSION WITH GOAL BLOOD PRESSURE LESS THAN 140/90: ICD-10-CM

## 2019-01-21 RX ORDER — LISINOPRIL 20 MG/1
TABLET ORAL
Qty: 30 TABLET | Refills: 1 | Status: SHIPPED | OUTPATIENT
Start: 2019-01-21 | End: 2019-02-11

## 2019-01-21 NOTE — TELEPHONE ENCOUNTER
"Requested Prescriptions   Pending Prescriptions Disp Refills     lisinopril (PRINIVIL/ZESTRIL) 20 MG tablet [Pharmacy Med Name: LISINOPRIL 20MG TABS] 30 tablet 1     Sig: TAKE ONE TABLET BY MOUTH ONCE DAILY    ACE Inhibitors (Including Combos) Protocol Failed - 1/21/2019  9:16 AM       Failed - Blood pressure under 140/90 in past 12 months    BP Readings from Last 3 Encounters:   12/27/18 148/70   12/13/18 152/72   11/26/18 180/84                Failed - Normal serum creatinine on file in past 12 months    Recent Labs   Lab Test 04/06/18  1251   CR 0.56*            Passed - Recent (12 mo) or future (30 days) visit within the authorizing provider's specialty    Patient had office visit in the last 12 months or has a visit in the next 30 days with authorizing provider or within the authorizing provider's specialty.  See \"Patient Info\" tab in inbasket, or \"Choose Columns\" in Meds & Orders section of the refill encounter.     Last Written Prescription Date:  11/26/18  Last Fill Quantity: 30,  # refills: 1   Last office visit: 12/27/2018 with prescribing provider:     Future Office Visit:   Next 5 appointments (look out 90 days)    Jan 24, 2019  8:40 AM CST  [facility]once [facility] [address] [place] [id number] [phone]                    Passed - Medication is active on med list       Passed - Patient is age 18 or older       Passed - Normal serum potassium on file in past 12 months    Recent Labs   Lab Test 12/27/18  0858   POTASSIUM 4.4               "

## 2019-02-11 ENCOUNTER — OFFICE VISIT (OUTPATIENT)
Dept: FAMILY MEDICINE | Facility: CLINIC | Age: 59
End: 2019-02-11
Payer: COMMERCIAL

## 2019-02-11 VITALS
OXYGEN SATURATION: 95 % | HEIGHT: 68 IN | SYSTOLIC BLOOD PRESSURE: 135 MMHG | HEART RATE: 68 BPM | RESPIRATION RATE: 24 BRPM | WEIGHT: 142.2 LBS | TEMPERATURE: 98.4 F | BODY MASS INDEX: 21.55 KG/M2 | DIASTOLIC BLOOD PRESSURE: 68 MMHG

## 2019-02-11 DIAGNOSIS — E87.1 HYPONATREMIA: Primary | ICD-10-CM

## 2019-02-11 DIAGNOSIS — I10 ESSENTIAL HYPERTENSION WITH GOAL BLOOD PRESSURE LESS THAN 140/90: ICD-10-CM

## 2019-02-11 DIAGNOSIS — Z13.6 CARDIOVASCULAR SCREENING; LDL GOAL LESS THAN 130: ICD-10-CM

## 2019-02-11 DIAGNOSIS — Z72.0 TOBACCO ABUSE: ICD-10-CM

## 2019-02-11 LAB
ANION GAP SERPL CALCULATED.3IONS-SCNC: 3 MMOL/L (ref 3–14)
BUN SERPL-MCNC: 12 MG/DL (ref 7–30)
CALCIUM SERPL-MCNC: 9.2 MG/DL (ref 8.5–10.1)
CHLORIDE SERPL-SCNC: 87 MMOL/L (ref 94–109)
CHOLEST SERPL-MCNC: 139 MG/DL
CO2 SERPL-SCNC: 32 MMOL/L (ref 20–32)
CREAT SERPL-MCNC: 0.57 MG/DL (ref 0.66–1.25)
GFR SERPL CREATININE-BSD FRML MDRD: >90 ML/MIN/{1.73_M2}
GLUCOSE SERPL-MCNC: 105 MG/DL (ref 70–99)
HDLC SERPL-MCNC: 57 MG/DL
LDLC SERPL CALC-MCNC: 71 MG/DL
NONHDLC SERPL-MCNC: 82 MG/DL
POTASSIUM SERPL-SCNC: 5 MMOL/L (ref 3.4–5.3)
SODIUM SERPL-SCNC: 122 MMOL/L (ref 133–144)
TRIGL SERPL-MCNC: 56 MG/DL

## 2019-02-11 PROCEDURE — 36415 COLL VENOUS BLD VENIPUNCTURE: CPT | Performed by: FAMILY MEDICINE

## 2019-02-11 PROCEDURE — 80061 LIPID PANEL: CPT | Performed by: FAMILY MEDICINE

## 2019-02-11 PROCEDURE — 80048 BASIC METABOLIC PNL TOTAL CA: CPT | Performed by: FAMILY MEDICINE

## 2019-02-11 PROCEDURE — 99213 OFFICE O/P EST LOW 20 MIN: CPT | Performed by: FAMILY MEDICINE

## 2019-02-11 RX ORDER — HYDROCHLOROTHIAZIDE 25 MG/1
25 TABLET ORAL DAILY
Qty: 30 TABLET | Refills: 11 | Status: SHIPPED | OUTPATIENT
Start: 2019-02-11 | End: 2019-02-17

## 2019-02-11 RX ORDER — ATORVASTATIN CALCIUM 20 MG/1
20 TABLET, FILM COATED ORAL DAILY
Qty: 30 TABLET | Refills: 11 | Status: SHIPPED | OUTPATIENT
Start: 2019-02-11 | End: 2020-02-18

## 2019-02-11 RX ORDER — LISINOPRIL 20 MG/1
20 TABLET ORAL DAILY
Qty: 30 TABLET | Refills: 11 | Status: SHIPPED | OUTPATIENT
Start: 2019-02-11 | End: 2020-02-18

## 2019-02-11 ASSESSMENT — MIFFLIN-ST. JEOR: SCORE: 1426.57

## 2019-02-11 ASSESSMENT — PAIN SCALES - GENERAL: PAINLEVEL: NO PAIN (0)

## 2019-02-11 NOTE — PATIENT INSTRUCTIONS
ASSESSMENT:   (I10) Essential hypertension with goal blood pressure less than 140/90  Comment: doing well on current medications.   Plan: hydrochlorothiazide (HYDRODIURIL) 25 MG tablet,        lisinopril (PRINIVIL/ZESTRIL) 20 MG tablet,         Basic metabolic panel        No change in current treatment plan..  Refills for a year.   Monitor BP periodically.  This can be done at home, in clinic, at our pharmacy, at a store or by neighbor or relative with a blood pressure cuff.  You should be sitting and relaxed for several minutes when taking blood pressure.   Goal BP (most readings) should be less than 140/90    Normal blood pressure is 120/80.    If your blood pressure is consistently at or above the goal, some changes should be made with lifestyle or medication to keep blood pressure under good control.    High blood pressure over time can lead to eye and kidney damage and increase risk for heart attacks and strokes.   Let us know if readings are often high, so we can help get your blood pressure under good control.     (Z72.0) Tobacco abuse  Comment: has cut down.  Would like to try Chantix again  Plan: varenicline (CHANTIX STARTING MONTH PAK) 0.5 MG        X 11 & 1 MG X 42 tablet        Reviewed potential side effects of Chantix including nausea and GI symptoms.  Also potential for abnormal dreams, suicidal thoughts  and psychiatric symptoms.   Instructions in use given with gradually increasing dose over the first week, 0.5 mg daily for 3 days, then twice daily for 4 days, then increase to 1 mg pills and take twice daily.  Start medication 1 week before stopping smoking.  Expect to use for 12 weeks and if successful in quitting, to continue use for another 12 weeks.  If not off cigarettes in 12 weeks, stop the medication and consider other options for quitting.     If Chantix not helping or causing side effects, let me know, there are other medication options to help with quitting smoking.     (Z13.6)  CARDIOVASCULAR SCREENING; LDL GOAL LESS THAN 130  Comment:   Plan: atorvastatin (LIPITOR) 20 MG tablet, Lipid         panel reflex to direct LDL Non-fasting        Non-fasting blood tests today.

## 2019-02-11 NOTE — PROGRESS NOTES
"  SUBJECTIVE:   Camacho Covington is a 58 year old male who presents to clinic today for the following health issues:  Chief Complaint   Patient presents with     Hypertension     Follow up      Last clinic visit: 11/26/2018 for hypertension and quitting smoking.  Prescribed Chantix.  Started him on lisinopril   Hypertension Follow-up      Outpatient blood pressures are not being checked.    Low Salt Diet: low salt      Amount of exercise or physical activity: None    Problems taking medications regularly: No    Medication side effects: none    Diet: regular (no restrictions)    He has had no side effects from lisinopril 20mg or hydrochlorothiazide 25mg daily.     He started Chantix.  He has cut back to 1/2 ppd.  He feels the Chantix makes him jittery and has difficulty sleeping at night.     Patient Active Problem List   Diagnosis     Pulmonary emphysema (H)     Essential hypertension with goal blood pressure less than 140/90     CARDIOVASCULAR SCREENING; LDL GOAL LESS THAN 130     Tobacco abuse      ROS:General: No change in weight, sleep or appetite.  Normal energy.  No fever or chills.  Some side effects from chantix as above.   Resp: POSITIVE for:, Hx COPD, umeclidinium (Incruse ellipta) seems to be helping.  Using albuteral inhaler every 4 hours  CV: No chest pains or palpitations  GI: No nausea, vomiting,  heartburn, abdominal pain, diarrhea, constipation or change in bowel habits  : No urinary frequency or dysuria, bladder or kidney problems  Musculoskeletal: No significant muscle or joint pains  Psychiatric: No problems with anxiety, depression or mental health    OBJECTIVE:Blood pressure 135/68, pulse 68, temperature 98.4  F (36.9  C), temperature source Tympanic, resp. rate 24, height 1.715 m (5' 7.5\"), weight 64.5 kg (142 lb 3.2 oz), SpO2 95 %. BMI=Body mass index is 21.94 kg/m .  GENERAL APPEARANCE ADULT: Alert, no acute distress     ASSESSMENT:   (I10) Essential hypertension with goal blood pressure " less than 140/90  Comment: doing well on current medications.   Plan: hydrochlorothiazide (HYDRODIURIL) 25 MG tablet,        lisinopril (PRINIVIL/ZESTRIL) 20 MG tablet,         Basic metabolic panel        No change in current treatment plan..  Refills for a year.   Monitor BP periodically.  This can be done at home, in clinic, at our pharmacy, at a store or by neighbor or relative with a blood pressure cuff.  You should be sitting and relaxed for several minutes when taking blood pressure.   Goal BP (most readings) should be less than 140/90    Normal blood pressure is 120/80.    If your blood pressure is consistently at or above the goal, some changes should be made with lifestyle or medication to keep blood pressure under good control.    High blood pressure over time can lead to eye and kidney damage and increase risk for heart attacks and strokes.   Let us know if readings are often high, so we can help get your blood pressure under good control.     (Z72.0) Tobacco abuse  Comment: has cut down.  Would like to try Chantix again  Plan: varenicline (CHANTIX STARTING MONTH PAK) 0.5 MG        X 11 & 1 MG X 42 tablet        Reviewed potential side effects of Chantix including nausea and GI symptoms.  Also potential for abnormal dreams, suicidal thoughts  and psychiatric symptoms.   Instructions in use given with gradually increasing dose over the first week, 0.5 mg daily for 3 days, then twice daily for 4 days, then increase to 1 mg pills and take twice daily.  Start medication 1 week before stopping smoking.  Expect to use for 12 weeks and if successful in quitting, to continue use for another 12 weeks.  If not off cigarettes in 12 weeks, stop the medication and consider other options for quitting.     If Chantix not helping or causing side effects, let me know, there are other medication options to help with quitting smoking.     (Z13.6) CARDIOVASCULAR SCREENING; LDL GOAL LESS THAN 130  Comment:   Plan:  atorvastatin (LIPITOR) 20 MG tablet, Lipid         panel reflex to direct LDL Non-fasting        Non-fasting blood tests today.

## 2019-02-11 NOTE — NURSING NOTE
"Chief Complaint   Patient presents with     Hypertension       Initial /68 (BP Location: Left arm, Patient Position: Sitting, Cuff Size: Adult Regular)   Pulse 68   Temp 98.4  F (36.9  C) (Tympanic)   Resp 24   Ht 1.715 m (5' 7.5\")   Wt 64.5 kg (142 lb 3.2 oz)   BMI 21.94 kg/m   Estimated body mass index is 21.94 kg/m  as calculated from the following:    Height as of this encounter: 1.715 m (5' 7.5\").    Weight as of this encounter: 64.5 kg (142 lb 3.2 oz).    Patient presents to the clinic using No DME    Health Maintenance that is potentially due pending provider review:  NONE    n/a    Is there anyone who you would like to be able to receive your results? Yes  Wife  Amy  If yes have patient fill out TEE    Nancy Langston CMA    "

## 2019-02-12 NOTE — RESULT ENCOUNTER NOTE
I notified Camacho.  Hyponatremia, possibly from hydrochlorothiazide.  Lipids good.   PLAN: Stop hydrochlorothiazide.    REcheck Chem 8, urine sodium, urine osm and serum osm later this week.

## 2019-02-15 DIAGNOSIS — E87.1 HYPONATREMIA: ICD-10-CM

## 2019-02-15 LAB
ANION GAP SERPL CALCULATED.3IONS-SCNC: 7 MMOL/L (ref 3–14)
BUN SERPL-MCNC: 7 MG/DL (ref 7–30)
CALCIUM SERPL-MCNC: 9.4 MG/DL (ref 8.5–10.1)
CHLORIDE SERPL-SCNC: 89 MMOL/L (ref 94–109)
CO2 SERPL-SCNC: 29 MMOL/L (ref 20–32)
CREAT SERPL-MCNC: 0.55 MG/DL (ref 0.66–1.25)
GFR SERPL CREATININE-BSD FRML MDRD: >90 ML/MIN/{1.73_M2}
GLUCOSE SERPL-MCNC: 100 MG/DL (ref 70–99)
OSMOLALITY SERPL: 289 MMOL/KG (ref 275–295)
OSMOLALITY UR: 318 MMOL/KG (ref 100–1200)
POTASSIUM SERPL-SCNC: 4.7 MMOL/L (ref 3.4–5.3)
SODIUM SERPL-SCNC: 125 MMOL/L (ref 133–144)
SODIUM UR-SCNC: 66 MMOL/L

## 2019-02-15 PROCEDURE — 83930 ASSAY OF BLOOD OSMOLALITY: CPT | Performed by: FAMILY MEDICINE

## 2019-02-15 PROCEDURE — 36415 COLL VENOUS BLD VENIPUNCTURE: CPT | Performed by: FAMILY MEDICINE

## 2019-02-15 PROCEDURE — 83935 ASSAY OF URINE OSMOLALITY: CPT | Performed by: FAMILY MEDICINE

## 2019-02-15 PROCEDURE — 80048 BASIC METABOLIC PNL TOTAL CA: CPT | Performed by: FAMILY MEDICINE

## 2019-02-15 PROCEDURE — 84300 ASSAY OF URINE SODIUM: CPT | Performed by: FAMILY MEDICINE

## 2019-02-17 DIAGNOSIS — E87.1 HYPONATREMIA: Primary | ICD-10-CM

## 2019-02-17 NOTE — RESULT ENCOUNTER NOTE
Please call.  His sodium remains low but improved some off the hydrochlorothiazide.  He has high urine sodium and urine osmolality so cause may be related to the hydrochlorothiazide but he may have SIADH.  PLAN: Cut back on fluid intake to about a quart of fluid per day.   REcheck Chem8 in 2 weeks.  If it remains low, we may need to do other testing.

## 2019-03-06 DIAGNOSIS — E87.1 HYPONATREMIA: Primary | ICD-10-CM

## 2019-03-06 DIAGNOSIS — E22.2 SIADH (SYNDROME OF INAPPROPRIATE ADH PRODUCTION) (H): ICD-10-CM

## 2019-03-06 DIAGNOSIS — E87.1 HYPONATREMIA: ICD-10-CM

## 2019-03-06 LAB
ANION GAP SERPL CALCULATED.3IONS-SCNC: 4 MMOL/L (ref 3–14)
BUN SERPL-MCNC: 11 MG/DL (ref 7–30)
CALCIUM SERPL-MCNC: 9.4 MG/DL (ref 8.5–10.1)
CHLORIDE SERPL-SCNC: 96 MMOL/L (ref 94–109)
CO2 SERPL-SCNC: 31 MMOL/L (ref 20–32)
CREAT SERPL-MCNC: 0.66 MG/DL (ref 0.66–1.25)
GFR SERPL CREATININE-BSD FRML MDRD: >90 ML/MIN/{1.73_M2}
GLUCOSE SERPL-MCNC: 98 MG/DL (ref 70–99)
POTASSIUM SERPL-SCNC: 4.9 MMOL/L (ref 3.4–5.3)
SODIUM SERPL-SCNC: 131 MMOL/L (ref 133–144)

## 2019-03-06 PROCEDURE — 36415 COLL VENOUS BLD VENIPUNCTURE: CPT | Performed by: FAMILY MEDICINE

## 2019-03-06 PROCEDURE — 80048 BASIC METABOLIC PNL TOTAL CA: CPT | Performed by: FAMILY MEDICINE

## 2019-03-06 NOTE — RESULT ENCOUNTER NOTE
Please call.  Sodium has been steadily improving. It remains a little low.  This may be due to lung disease.    PLAN: I suggest doing a chest CT scan to check for lung cancer which can cuase low sodium.   He has been a long time smoker.   I ordered chest CT.  Patient is to contact [facility]  at [phone], to schedule this appointment.  Keep working in quitting smoking.

## 2019-03-18 ENCOUNTER — HOSPITAL ENCOUNTER (OUTPATIENT)
Dept: CT IMAGING | Facility: CLINIC | Age: 59
Discharge: HOME | End: 2019-03-18
Attending: FAMILY MEDICINE | Admitting: FAMILY MEDICINE
Payer: COMMERCIAL

## 2019-03-18 DIAGNOSIS — E22.2 SIADH (SYNDROME OF INAPPROPRIATE ADH PRODUCTION) (H): ICD-10-CM

## 2019-03-18 DIAGNOSIS — E87.1 HYPONATREMIA: ICD-10-CM

## 2019-03-18 PROCEDURE — 71250 CT THORAX DX C-: CPT

## 2019-03-18 NOTE — RESULT ENCOUNTER NOTE
Please call.  He has emphysema on chest CT.  NO sign of lung cancer at this time.  There are tiny nodules.   PLAN: follow-up chest CT in 12 months.

## 2019-07-03 DIAGNOSIS — J43.9 PULMONARY EMPHYSEMA, UNSPECIFIED EMPHYSEMA TYPE (H): ICD-10-CM

## 2019-07-03 RX ORDER — ALBUTEROL SULFATE 90 UG/1
AEROSOL, METERED RESPIRATORY (INHALATION)
Qty: 18 G | Refills: 11 | Status: SHIPPED | OUTPATIENT
Start: 2019-07-03 | End: 2020-03-09

## 2019-07-03 NOTE — TELEPHONE ENCOUNTER
"Requested Prescriptions   Pending Prescriptions Disp Refills     VENTOLIN  (90 Base) MCG/ACT inhaler [Pharmacy Med Name: VENTOLIN HFA 108MCG/ACT AERS] 18 g 11     Sig: INHALE 2 PUFFS INTO THE LUNGS EVERY 4 HOURS AS NEEDED FOR SHORTNESS OF BREATH, DIFFICULTY BREATHING OR WHEEZING.       Asthma Maintenance Inhalers - Anticholinergics Passed - 7/3/2019 12:11 PM        Passed - Patient is age 12 years or older        Passed - Recent (12 mo) or future (30 days) visit within the authorizing provider's specialty     Patient had office visit in the last 12 months or has a visit in the next 30 days with authorizing provider or within the authorizing provider's specialty.  See \"Patient Info\" tab in inbasket, or \"Choose Columns\" in Meds & Orders section of the refill encounter.      No flowsheet data found. for act3              Passed - Medication is active on med list        Last Written Prescription Date:  11/26/18  Last Fill Quantity: 1,  # refills: 11   Last office visit: 2/11/2019 with prescribing provider:     Future Office Visit:      "

## 2019-07-23 ENCOUNTER — ALLIED HEALTH/NURSE VISIT (OUTPATIENT)
Dept: FAMILY MEDICINE | Facility: CLINIC | Age: 59
End: 2019-07-23
Payer: COMMERCIAL

## 2019-07-23 VITALS — SYSTOLIC BLOOD PRESSURE: 175 MMHG | DIASTOLIC BLOOD PRESSURE: 84 MMHG

## 2019-07-23 DIAGNOSIS — I10 ESSENTIAL HYPERTENSION WITH GOAL BLOOD PRESSURE LESS THAN 140/90: Primary | ICD-10-CM

## 2019-07-23 PROCEDURE — 99207 ZZC NO CHARGE NURSE ONLY: CPT | Performed by: FAMILY MEDICINE

## 2019-07-23 NOTE — PROGRESS NOTES
"Camacho Covington was evaluated at [facility] on July 23, 2019 at which time his blood pressure was:    BP Readings from Last 3 Encounters:   07/23/19 175/84   02/11/19 135/68   12/27/18 148/70     Pulse Readings from Last 3 Encounters:   02/11/19 68   12/27/18 76   12/13/18 82       Reviewed lifestyle modifications for blood pressure control and reduction: including making healthy food choices, managing weight, getting regular exercise, smoking cessation, reducing alcohol consumption, monitoring blood pressure regularly.     Symptoms: None - patient denied any symptoms at this time.       BP Goal:< 140/90 mmHg    BP Assessment:  BP too high    Potential Reasons for BP too high: Tobacco use within past 30 minutes, caffeine use, albuterol use.  Patient states that he had his lisinopril today and denies missing doses, but last Feb his BP was doing well when on both hydrochlorothiazide and lisinopril.  The hydrochlorothiazide was discontinued due to labs bit lisinopril was not changed.    BP Follow-Up Plan: Recheck BP at clinic/pharmacy within 2-4 weeks.    Recommendation to Provider: Consider increasing lisinopril to 40mg daily.    Please also consider reaching out to patient regarding \"emphysema.\"  Camacho states that he stopped using the Incruse Ellipta due to some side effects and has not yet discussed with Dr. Ponce.  He reports using 2 puffs of albuterol four times per day regularly, and he continues to smoke. We discussed the importance of talking to Dr before stopping especially if there are side effects, and discussed that the maintenance inhaler will prevent emphysema symptoms from continuing to worsen while the albuterol does not have the same effect at preventing symptoms from worsening.  Patient declined discussing smoking cessation at this time. Recommended to patient to consider making appt to discuss, but he states that the clinic will let him know if he needs to come in.      Note completed by: " Thanks!     Shanel Sebastian, PharmD, [facility] Pharmacist    on behalf of: [facility].

## 2019-07-23 NOTE — Clinical Note
"Recommendation to Provider: Consider increasing lisinopril to 40mg daily.Please also consider reaching out to patient regarding \"emphysema.\"  Camacho states that he stopped using the Incruse Ellipta due to some side effects and has not yet discussed with Dr. Ponce.  He reports using 2 puffs of albuterol four times per day regularly, and he continues to smoke. We discussed the importance of talking to Dr before stopping especially if there are side effects, and discussed that the maintenance inhaler will prevent emphysema symptoms from continuing to worsen while the albuterol does not have the same effect at preventing symptoms from worsening.  Patient declined discussing smoking cessation at this time. Recommended to patient to consider making appt to discuss, but he states that the clinic will let him know if he needs to come in. "

## 2019-07-28 ENCOUNTER — TELEPHONE (OUTPATIENT)
Dept: FAMILY MEDICINE | Facility: CLINIC | Age: 59
End: 2019-07-28

## 2019-07-28 DIAGNOSIS — I10 ESSENTIAL HYPERTENSION WITH GOAL BLOOD PRESSURE LESS THAN 140/90: Primary | ICD-10-CM

## 2019-07-28 RX ORDER — AMLODIPINE BESYLATE 5 MG/1
5 TABLET ORAL DAILY
Qty: 30 TABLET | Refills: 1 | Status: SHIPPED | OUTPATIENT
Start: 2019-07-28 | End: 2019-09-28

## 2019-07-28 NOTE — TELEPHONE ENCOUNTER
Recent visit for blood pressure which remains high.   Last clinic visit: February, 2019.  On lisinopril 20mg daily.    Had hyponatremia on hydrochlorothiazide.   COPD using albuteral inhaler a lot.   Continues to smoke.   PLAN: Continue lisinopril 20mg daily.  Add amlodipine 5mg daily  Recheck blood pressure in 2-4 weeks with clinic RN.   See me to discuss COPD.    KELLEY FERGUSON MD

## 2019-07-29 RX ORDER — AMLODIPINE BESYLATE 5 MG/1
5 TABLET ORAL DAILY
Qty: 30 TABLET | Refills: 1 | Status: SHIPPED | OUTPATIENT
Start: 2019-07-29 | End: 2019-09-27

## 2019-09-27 ENCOUNTER — OFFICE VISIT (OUTPATIENT)
Dept: FAMILY MEDICINE | Facility: CLINIC | Age: 59
End: 2019-09-27
Payer: COMMERCIAL

## 2019-09-27 VITALS
DIASTOLIC BLOOD PRESSURE: 66 MMHG | OXYGEN SATURATION: 96 % | SYSTOLIC BLOOD PRESSURE: 130 MMHG | RESPIRATION RATE: 20 BRPM | HEIGHT: 68 IN | WEIGHT: 134.2 LBS | BODY MASS INDEX: 20.34 KG/M2 | HEART RATE: 87 BPM | TEMPERATURE: 98 F

## 2019-09-27 DIAGNOSIS — L98.9 SKIN LESION: ICD-10-CM

## 2019-09-27 DIAGNOSIS — Z72.0 TOBACCO ABUSE: ICD-10-CM

## 2019-09-27 DIAGNOSIS — I10 ESSENTIAL HYPERTENSION WITH GOAL BLOOD PRESSURE LESS THAN 140/90: ICD-10-CM

## 2019-09-27 DIAGNOSIS — Z11.4 SCREENING FOR HUMAN IMMUNODEFICIENCY VIRUS: ICD-10-CM

## 2019-09-27 DIAGNOSIS — Z23 NEED FOR VACCINATION: ICD-10-CM

## 2019-09-27 DIAGNOSIS — Z11.59 NEED FOR HEPATITIS C SCREENING TEST: ICD-10-CM

## 2019-09-27 DIAGNOSIS — J43.9 PULMONARY EMPHYSEMA, UNSPECIFIED EMPHYSEMA TYPE (H): Primary | ICD-10-CM

## 2019-09-27 DIAGNOSIS — Z23 NEED FOR PROPHYLACTIC VACCINATION AND INOCULATION AGAINST INFLUENZA: ICD-10-CM

## 2019-09-27 LAB
ANION GAP SERPL CALCULATED.3IONS-SCNC: 5 MMOL/L (ref 3–14)
BUN SERPL-MCNC: 13 MG/DL (ref 7–30)
CALCIUM SERPL-MCNC: 9.1 MG/DL (ref 8.5–10.1)
CHLORIDE SERPL-SCNC: 93 MMOL/L (ref 94–109)
CO2 SERPL-SCNC: 28 MMOL/L (ref 20–32)
CREAT SERPL-MCNC: 0.58 MG/DL (ref 0.66–1.25)
GFR SERPL CREATININE-BSD FRML MDRD: >90 ML/MIN/{1.73_M2}
GLUCOSE SERPL-MCNC: 91 MG/DL (ref 70–99)
POTASSIUM SERPL-SCNC: 4.2 MMOL/L (ref 3.4–5.3)
SODIUM SERPL-SCNC: 126 MMOL/L (ref 133–144)

## 2019-09-27 PROCEDURE — 90682 RIV4 VACC RECOMBINANT DNA IM: CPT | Performed by: FAMILY MEDICINE

## 2019-09-27 PROCEDURE — 87389 HIV-1 AG W/HIV-1&-2 AB AG IA: CPT | Performed by: FAMILY MEDICINE

## 2019-09-27 PROCEDURE — 90732 PPSV23 VACC 2 YRS+ SUBQ/IM: CPT | Performed by: FAMILY MEDICINE

## 2019-09-27 PROCEDURE — 99214 OFFICE O/P EST MOD 30 MIN: CPT | Mod: 25 | Performed by: FAMILY MEDICINE

## 2019-09-27 PROCEDURE — 90471 IMMUNIZATION ADMIN: CPT | Performed by: FAMILY MEDICINE

## 2019-09-27 PROCEDURE — 36415 COLL VENOUS BLD VENIPUNCTURE: CPT | Performed by: FAMILY MEDICINE

## 2019-09-27 PROCEDURE — 90472 IMMUNIZATION ADMIN EACH ADD: CPT | Performed by: FAMILY MEDICINE

## 2019-09-27 PROCEDURE — 80048 BASIC METABOLIC PNL TOTAL CA: CPT | Performed by: FAMILY MEDICINE

## 2019-09-27 ASSESSMENT — MIFFLIN-ST. JEOR: SCORE: 1390.29

## 2019-09-27 NOTE — NURSING NOTE
Prior to immunization administration, verified patients identity using patient s name and date of birth. Please see Immunization Activity for additional information.     Screening Questionnaire for Adult Immunization    Are you sick today?   No   Do you have allergies to medications, food, a vaccine component or latex?   No   Have you ever had a serious reaction after receiving a vaccination?   No   Do you have a long-term health problem with heart disease, lung disease, asthma, kidney disease, metabolic disease (e.g. diabetes), anemia, or other blood disorder?   Yes   Do you have cancer, leukemia, HIV/AIDS, or any other immune system problem?   No   In the past 3 months, have you taken medications that affect  your immune system, such as prednisone, other steroids, or anticancer drugs; drugs for the treatment of rheumatoid arthritis, Crohn s disease, or psoriasis; or have you had radiation treatments?   No   Have you had a seizure, or a brain or other nervous system problem?   No   During the past year, have you received a transfusion of blood or blood     products, or been given immune (gamma) globulin or antiviral drug?   No   For women: Are you pregnant or is there a chance you could become        pregnant during the next month?   No   Have you received any vaccinations in the past 4 weeks?   No     Immunization questionnaire was positive for at least one answer.  Notified Dr. Ponce.        Per orders of Dr. Ponce, injection of PPV 23 and influenza given by Jaqueline Stapleton CMA. Patient instructed to remain in clinic for 15 minutes afterwards, and to report any adverse reaction to me immediately.       Screening performed by Jaqueline Stapleton CMA on 9/27/2019 at 11:23 AM.

## 2019-09-27 NOTE — PATIENT INSTRUCTIONS
ASSESSMENT:   (J43.9) Pulmonary emphysema, unspecified emphysema type (H)  (primary encounter diagnosis)  Comment: severe lung disease.   Plan: General PFT Lab (Please always keep checked), 6        minute walk test, Ipratropium-Albuterol         (COMBIVENT RESPIMAT)  MCG/ACT inhaler,         PULMONARY MEDICINE REFERRAL          Flu shot today  Pneumonia vaccine  Schedule an appointment with respiratory therapy for a walk test to see if you qualify for oxygen.   Call [phone] to schedule.   Consider pulmonary rehab to improve you lung function.   Schedule an appointment with amairani specialist.   YOu have not been taking umeclidinium (Incruse ellipta).    Try Combivent every 4 hours.  This has albuteral plus a medication like the Incruse-Ellipta umeclidinium.  Albuteral inhaler can be used taking 2 inhalations every 4 hours as needed for coughing, wheezing or shortness of breath.   Quitting smoking is the best way to keep from having more lung damage and increase in breathing problems.     (I10) Essential hypertension with goal blood pressure less than 140/90  Comment: doing OK  Plan: Basic metabolic panel        No change in current treatment plan.   Monitor BP periodically.  This can be done at home, in clinic, at our pharmacy, at a store or by neighbor or relative with a blood pressure cuff.  You should be sitting and relaxed for several minutes when taking blood pressure.   Goal BP (most readings) should be less than 140/90    Normal blood pressure is 120/80.    If your blood pressure is consistently at or above the goal, some changes should be made with lifestyle or medication to keep blood pressure under good control.    High blood pressure over time can lead to eye and kidney damage and increase risk for heart attacks and strokes.   Let us know if readings are often high, so we can help get your blood pressure under good control.      (Z72.0) Tobacco abuse  Comment: Quitting smoking is absolutely  essential for your health.   Plan: Start back on the Chantix.  I ca do refills at any time-just call.   Reviewed potential side effects of Chantix including nausea and GI symptoms.  Also potential for abnormal dreams, suicidal thoughts  and psychiatric symptoms.   Instructions in use given with gradually increasing dose over the first week, 0.5 mg daily for 3 days, then twice daily for 4 days, then increase to 1 mg pills and take twice daily.  Start medication 1 week before stopping smoking.  Expect to use for 12 weeks and if successful in quitting, to continue use for another 12 weeks.  If not off cigarettes in 12 weeks, stop the medication and consider other options for quitting.     (Z11.4) Screening for human immunodeficiency virus  Comment:   Plan: HIV Antigen Antibody Combo          (Z11.59) Need for hepatitis C screening test  Comment:   Plan: **Hepatitis C Screen Reflex to RNA FUTURE         anytime          Recheck Chem 8 due to low sodium on the hydrochlorothiazide     Schedule an appointment with me for removing the skin spot on your back.  It could be a skin cancer.

## 2019-09-27 NOTE — PROGRESS NOTES
SUBJECTIVE: Camacho Covington is a 58 year old male    Chief Complaint   Patient presents with     Hypertension      Hypertension Follow-up      Do you check your blood pressure regularly outside of the clinic? No     Are you following a low salt diet? Yes    Are your blood pressures ever more than 140 on the top number (systolic) OR more   than 90 on the bottom number (diastolic), for example 140/90? No      How many servings of fruits and vegetables do you eat daily?  0-1    On average, how many sweetened beverages do you drink each day (soda, juice, sweet tea, etc)?   1    How many days per week do you miss taking your medication? 0    Last clinic visit: 2/11/2019.  He had hyponatremia.   hydrochlorothiazide was stopped.  Chantix prescribed.     Not checking blood pressure recently.   Currently on amlodipine 5mg daily.  lisinopril 20mg daily.     Problem 2: COPD.   umeclidinium (Incruse ellipta) seemed to work initially.  Powder seemed to irritate throat.  He stopped the medication and his urinary frequency improved as did sleep.  Not taking currently.   Using albuteral inhaler every 4 hours.    A little cough and phlegm.   Can walk 20-30' only.      Problem 3: tobacco.   Chantix made him run down.     Smoking 1/2 ppd.     Patient Active Problem List   Diagnosis     Pulmonary emphysema (H)     Essential hypertension with goal blood pressure less than 140/90     CARDIOVASCULAR SCREENING; LDL GOAL LESS THAN 130     Tobacco abuse     Hyponatremia      ROS:General: No change in weight, sleep or appetite.  Normal energy.  No fever or chills  CV: No chest pains or palpitations  GI: No nausea, vomiting,  heartburn, abdominal pain, diarrhea, constipation or change in bowel habits  : No urinary frequency or dysuria, bladder or kidney problems  Musculoskeletal: No significant muscle or joint pains  Psychiatric: No problems with anxiety, depression or mental health    OBJECTIVE:Blood pressure 130/66, pulse 87, temperature 98  " F (36.7  C), temperature source Tympanic, resp. rate 20, height 1.715 m (5' 7.5\"), weight 60.9 kg (134 lb 3.2 oz), SpO2 96 %. BMI=Body mass index is 20.71 kg/m .  GENERAL APPEARANCE ADULT: Alert, no acute distress  NECK: No adenopathy,masses or thyromegaly  RESP: lungs clear to auscultation , diminished breath sounds throughout  CV: normal rate, regular rhythm, no murmur or gallop  ABDOMEN: soft, no organomegaly, masses or tenderness  MS: extremities normal, no peripheral edema     ASSESSMENT:   (J43.9) Pulmonary emphysema, unspecified emphysema type (H)  (primary encounter diagnosis)  Comment: severe lung disease.   Plan: General PFT Lab (Please always keep checked), 6        minute walk test, Ipratropium-Albuterol         (COMBIVENT RESPIMAT)  MCG/ACT inhaler,         PULMONARY MEDICINE REFERRAL          Flu shot today  Pneumonia vaccine  Schedule an appointment with respiratory therapy for a walk test to see if you qualify for oxygen.   Call [phone] to schedule.   Consider pulmonary rehab to improve you lung function.   Schedule an appointment with amairani specialist.   YOu have not been taking umeclidinium (Incruse ellipta).    Try Combivent every 4 hours.  This has albuteral plus a medication like the Incruse-Ellipta umeclidinium.  Albuteral inhaler can be used taking 2 inhalations every 4 hours as needed for coughing, wheezing or shortness of breath.   Quitting smoking is the best way to keep from having more lung damage and increase in breathing problems.     (I10) Essential hypertension with goal blood pressure less than 140/90  Comment: doing OK  Plan: Basic metabolic panel        No change in current treatment plan.   Monitor BP periodically.  This can be done at home, in clinic, at our pharmacy, at a store or by neighbor or relative with a blood pressure cuff.  You should be sitting and relaxed for several minutes when taking blood pressure.   Goal BP (most readings) should be less than 140/90  "   Normal blood pressure is 120/80.    If your blood pressure is consistently at or above the goal, some changes should be made with lifestyle or medication to keep blood pressure under good control.    High blood pressure over time can lead to eye and kidney damage and increase risk for heart attacks and strokes.   Let us know if readings are often high, so we can help get your blood pressure under good control.      (Z72.0) Tobacco abuse  Comment: Quitting smoking is absolutely essential for your health.   Plan: Start back on the Chantix.  I ca do refills at any time-just call.   Reviewed potential side effects of Chantix including nausea and GI symptoms.  Also potential for abnormal dreams, suicidal thoughts  and psychiatric symptoms.   Instructions in use given with gradually increasing dose over the first week, 0.5 mg daily for 3 days, then twice daily for 4 days, then increase to 1 mg pills and take twice daily.  Start medication 1 week before stopping smoking.  Expect to use for 12 weeks and if successful in quitting, to continue use for another 12 weeks.  If not off cigarettes in 12 weeks, stop the medication and consider other options for quitting.     (Z11.4) Screening for human immunodeficiency virus  Comment:   Plan: HIV Antigen Antibody Combo          (Z11.59) Need for hepatitis C screening test  Comment:   Plan: **Hepatitis C Screen Reflex to RNA FUTURE         anytime          Recheck Chem 8 due to low sodium on the hydrochlorothiazide     Schedule an appointment with me for removing the skin spot on your back.  It could be a skin cancer.

## 2019-09-27 NOTE — NURSING NOTE
"Chief Complaint   Patient presents with     Hypertension       Initial BP (!) 146/70 (BP Location: Right arm, Patient Position: Sitting, Cuff Size: Adult Regular)   Pulse 87   Temp 98  F (36.7  C) (Tympanic)   Resp 20   Ht 1.715 m (5' 7.5\")   Wt 60.9 kg (134 lb 3.2 oz)   SpO2 96%   BMI 20.71 kg/m   Estimated body mass index is 20.71 kg/m  as calculated from the following:    Height as of this encounter: 1.715 m (5' 7.5\").    Weight as of this encounter: 60.9 kg (134 lb 3.2 oz).    Patient presents to the clinic using No DME    Health Maintenance that is potentially due pending provider review:  NONE    n/a    Is there anyone who you would like to be able to receive your results? No  If yes have patient fill out TEE    Magaly Martínez CMA    "

## 2019-09-28 DIAGNOSIS — I10 ESSENTIAL HYPERTENSION WITH GOAL BLOOD PRESSURE LESS THAN 140/90: ICD-10-CM

## 2019-09-30 DIAGNOSIS — E87.1 HYPONATREMIA: Primary | ICD-10-CM

## 2019-09-30 LAB — HIV 1+2 AB+HIV1 P24 AG SERPL QL IA: NONREACTIVE

## 2019-09-30 RX ORDER — AMLODIPINE BESYLATE 5 MG/1
TABLET ORAL
Qty: 90 TABLET | Refills: 1 | Status: SHIPPED | OUTPATIENT
Start: 2019-09-30 | End: 2020-03-27

## 2019-09-30 NOTE — RESULT ENCOUNTER NOTE
Please call.  HIV test for AIDS virus is negative.  He has low sodium.  He has been off diuretic medication.  This could be related to emphysema lung disease.  He likely has SIADH as a cause.   PLAN: come in this week for additional tests; urine sodium, urine osmolality and chem8.  I did place orders.

## 2019-10-02 DIAGNOSIS — E87.1 HYPONATREMIA: ICD-10-CM

## 2019-10-02 DIAGNOSIS — Z11.59 NEED FOR HEPATITIS C SCREENING TEST: ICD-10-CM

## 2019-10-02 LAB
ANION GAP SERPL CALCULATED.3IONS-SCNC: 5 MMOL/L (ref 3–14)
BUN SERPL-MCNC: 15 MG/DL (ref 7–30)
CALCIUM SERPL-MCNC: 9.3 MG/DL (ref 8.5–10.1)
CHLORIDE SERPL-SCNC: 91 MMOL/L (ref 94–109)
CO2 SERPL-SCNC: 28 MMOL/L (ref 20–32)
CREAT SERPL-MCNC: 0.61 MG/DL (ref 0.66–1.25)
GFR SERPL CREATININE-BSD FRML MDRD: >90 ML/MIN/{1.73_M2}
GLUCOSE SERPL-MCNC: 120 MG/DL (ref 70–99)
HCV AB SERPL QL IA: NONREACTIVE
OSMOLALITY UR: 469 MMOL/KG (ref 100–1200)
POTASSIUM SERPL-SCNC: 4.5 MMOL/L (ref 3.4–5.3)
SODIUM SERPL-SCNC: 124 MMOL/L (ref 133–144)
SODIUM UR-SCNC: 55 MMOL/L

## 2019-10-02 PROCEDURE — 83935 ASSAY OF URINE OSMOLALITY: CPT | Performed by: FAMILY MEDICINE

## 2019-10-02 PROCEDURE — 80048 BASIC METABOLIC PNL TOTAL CA: CPT | Performed by: FAMILY MEDICINE

## 2019-10-02 PROCEDURE — 86803 HEPATITIS C AB TEST: CPT | Performed by: FAMILY MEDICINE

## 2019-10-02 PROCEDURE — 84300 ASSAY OF URINE SODIUM: CPT | Performed by: FAMILY MEDICINE

## 2019-10-02 PROCEDURE — 36415 COLL VENOUS BLD VENIPUNCTURE: CPT | Performed by: FAMILY MEDICINE

## 2019-10-03 DIAGNOSIS — E87.1 HYPONATREMIA: Primary | ICD-10-CM

## 2019-10-03 NOTE — RESULT ENCOUNTER NOTE
Please call to make sure he gets the message.  Please arrange for orders. Future chem8   Hi Camacho,  Your sodium is lower than it has been.    It looks like the cause is called SIADH which is a problem of kidneys not regulating the sodium well.  It may be caused from lung disease.   PLAN: Limiting your daily fluid intake can help.    I suggest limiting to 100mg (one quart) of fluids total in a day.   Recheck blood test next week-chem8.  If this is not working, I will recommend adding salt tablets, one gram three times daily.   KELLEY FERGUSON MD

## 2019-10-09 DIAGNOSIS — E87.1 HYPONATREMIA: ICD-10-CM

## 2019-10-09 LAB
ANION GAP SERPL CALCULATED.3IONS-SCNC: 5 MMOL/L (ref 3–14)
BUN SERPL-MCNC: 15 MG/DL (ref 7–30)
CALCIUM SERPL-MCNC: 9.6 MG/DL (ref 8.5–10.1)
CHLORIDE SERPL-SCNC: 98 MMOL/L (ref 94–109)
CO2 SERPL-SCNC: 28 MMOL/L (ref 20–32)
CREAT SERPL-MCNC: 0.6 MG/DL (ref 0.66–1.25)
GFR SERPL CREATININE-BSD FRML MDRD: >90 ML/MIN/{1.73_M2}
GLUCOSE SERPL-MCNC: 129 MG/DL (ref 70–99)
POTASSIUM SERPL-SCNC: 4.9 MMOL/L (ref 3.4–5.3)
SODIUM SERPL-SCNC: 131 MMOL/L (ref 133–144)

## 2019-10-09 PROCEDURE — 80048 BASIC METABOLIC PNL TOTAL CA: CPT | Performed by: FAMILY MEDICINE

## 2019-10-09 PROCEDURE — 36415 COLL VENOUS BLD VENIPUNCTURE: CPT | Performed by: FAMILY MEDICINE

## 2019-10-10 ENCOUNTER — MYC REFILL (OUTPATIENT)
Dept: FAMILY MEDICINE | Facility: CLINIC | Age: 59
End: 2019-10-10

## 2019-10-10 DIAGNOSIS — Z72.0 TOBACCO ABUSE: ICD-10-CM

## 2019-10-10 RX ORDER — VARENICLINE TARTRATE 1 MG/1
1 TABLET, FILM COATED ORAL 2 TIMES DAILY
Qty: 60 TABLET | Refills: 2 | Status: ON HOLD | OUTPATIENT
Start: 2019-10-10 | End: 2021-01-01

## 2019-10-10 NOTE — TELEPHONE ENCOUNTER
"Requested Prescriptions   Pending Prescriptions Disp Refills     varenicline (CHANTIX STARTING MONTH MELINA) 0.5 MG X 11 & 1 MG X 42 tablet 53 tablet 5     Si.5 mg daily for 3 days, then twice daily for 4 days, then increase to 1 mg pills and take twice daily.       Partial Cholinergic Nicotinic Agonist Agents Passed - 10/10/2019 10:19 AM        Passed - Blood pressure under 140/90 in past 12 months     BP Readings from Last 3 Encounters:   19 130/66   19 175/84   19 135/68                 Passed - Recent (12 mo) or future (30 days) visit within the authorizing provider's specialty     Patient has had an office visit with the authorizing provider or a provider within the authorizing providers department within the previous 12 mos or has a future within next 30 days. See \"Patient Info\" tab in inbasket, or \"Choose Columns\" in Meds & Orders section of the refill encounter.              Passed - Medication is active on med list        Passed - Patient is 18 years of age or older        Last Written Prescription Date:  19  Last Fill Quantity: 53,  # refills: 5   Last office visit: 2019 with prescribing provider:  Leaf   Future Office Visit:   Next 5 appointments (look out 90 days)    Oct 24, 2019  7:40 AM CDT  SHORT with Theo Ponce MD  [facility] [address] [place] [id number] [phone]           "

## 2019-10-16 ENCOUNTER — HOSPITAL ENCOUNTER (OUTPATIENT)
Dept: RESPIRATORY THERAPY | Facility: CLINIC | Age: 59
Discharge: HOME | End: 2019-10-16
Attending: INTERNAL MEDICINE | Admitting: INTERNAL MEDICINE
Payer: COMMERCIAL

## 2019-10-16 ENCOUNTER — HOSPITAL ENCOUNTER (OUTPATIENT)
Dept: RESPIRATORY THERAPY | Facility: CLINIC | Age: 59
End: 2019-10-16
Attending: INTERNAL MEDICINE
Payer: COMMERCIAL

## 2019-10-16 DIAGNOSIS — J43.9 PULMONARY EMPHYSEMA, UNSPECIFIED EMPHYSEMA TYPE (H): ICD-10-CM

## 2019-10-16 PROCEDURE — 25000125 ZZHC RX 250: Performed by: FAMILY MEDICINE

## 2019-10-16 PROCEDURE — 94060 EVALUATION OF WHEEZING: CPT

## 2019-10-16 PROCEDURE — 94618 PULMONARY STRESS TESTING: CPT

## 2019-10-16 PROCEDURE — 94726 PLETHYSMOGRAPHY LUNG VOLUMES: CPT

## 2019-10-16 RX ORDER — ALBUTEROL SULFATE 0.83 MG/ML
2.5 SOLUTION RESPIRATORY (INHALATION) ONCE
Status: COMPLETED | OUTPATIENT
Start: 2019-10-16 | End: 2019-10-16

## 2019-10-16 RX ADMIN — ALBUTEROL SULFATE 2.5 MG: 2.5 SOLUTION RESPIRATORY (INHALATION) at 10:25

## 2019-10-17 LAB
ERV-%PRED-PRE: 88 %
ERV-PRE: 1.26 L
ERV-PRED: 1.42 L
EXPTIME-PRE: 10.24 SEC
FEF2575-%PRED-POST: 12 %
FEF2575-%PRED-PRE: 9 %
FEF2575-POST: 0.36 L/SEC
FEF2575-PRE: 0.27 L/SEC
FEF2575-PRED: 2.78 L/SEC
FEFMAX-%PRED-PRE: 25 %
FEFMAX-PRE: 2.16 L/SEC
FEFMAX-PRED: 8.56 L/SEC
FEV1-%PRED-PRE: 22 %
FEV1-PRE: 0.74 L
FEV1FEV6-PRE: 37 %
FEV1FEV6-PRED: 79 %
FEV1FVC-PRE: 29 %
FEV1FVC-PRED: 78 %
FEV1SVC-PRE: 21 %
FEV1SVC-PRED: 73 %
FIFMAX-PRE: 2.47 L/SEC
FRCPLETH-%PRED-PRE: 270 %
FRCPLETH-PRE: 9.3 L
FRCPLETH-PRED: 3.43 L
FVC-%PRED-PRE: 61 %
FVC-PRE: 2.55 L
FVC-PRED: 4.18 L
IC-%PRED-PRE: 63 %
IC-PRE: 1.93 L
IC-PRED: 3.04 L
RVPLETH-%PRED-PRE: 334 %
RVPLETH-PRE: 7.75 L
RVPLETH-PRED: 2.32 L
TLCPLETH-%PRED-PRE: 172 %
TLCPLETH-PRE: 11.23 L
TLCPLETH-PRED: 6.52 L
VC-%PRED-PRE: 77 %
VC-PRE: 3.48 L
VC-PRED: 4.47 L

## 2019-10-21 NOTE — RESULT ENCOUNTER NOTE
Please call.  He has severe emphysema.  We can discuss at his upcoming appointment.    I recommend scheduling appointment with lung specialist and consider pulmonary rehab.

## 2019-10-24 ENCOUNTER — OFFICE VISIT (OUTPATIENT)
Dept: FAMILY MEDICINE | Facility: CLINIC | Age: 59
End: 2019-10-24
Payer: COMMERCIAL

## 2019-10-24 ENCOUNTER — TELEPHONE (OUTPATIENT)
Dept: FAMILY MEDICINE | Facility: CLINIC | Age: 59
End: 2019-10-24

## 2019-10-24 VITALS
RESPIRATION RATE: 22 BRPM | TEMPERATURE: 97.4 F | SYSTOLIC BLOOD PRESSURE: 150 MMHG | OXYGEN SATURATION: 96 % | BODY MASS INDEX: 19.55 KG/M2 | HEIGHT: 68 IN | DIASTOLIC BLOOD PRESSURE: 70 MMHG | HEART RATE: 76 BPM | WEIGHT: 129 LBS

## 2019-10-24 DIAGNOSIS — J44.9 COPD (CHRONIC OBSTRUCTIVE PULMONARY DISEASE) (H): Primary | ICD-10-CM

## 2019-10-24 DIAGNOSIS — L98.9 SKIN LESION: Primary | ICD-10-CM

## 2019-10-24 DIAGNOSIS — I10 ESSENTIAL HYPERTENSION WITH GOAL BLOOD PRESSURE LESS THAN 140/90: ICD-10-CM

## 2019-10-24 DIAGNOSIS — J43.9 PULMONARY EMPHYSEMA, UNSPECIFIED EMPHYSEMA TYPE (H): ICD-10-CM

## 2019-10-24 DIAGNOSIS — Z72.0 TOBACCO ABUSE: ICD-10-CM

## 2019-10-24 DIAGNOSIS — Z12.11 SCREENING FOR COLON CANCER: ICD-10-CM

## 2019-10-24 PROCEDURE — 88305 TISSUE EXAM BY PATHOLOGIST: CPT | Performed by: FAMILY MEDICINE

## 2019-10-24 PROCEDURE — 99213 OFFICE O/P EST LOW 20 MIN: CPT | Mod: 25 | Performed by: FAMILY MEDICINE

## 2019-10-24 PROCEDURE — 88312 SPECIAL STAINS GROUP 1: CPT | Performed by: FAMILY MEDICINE

## 2019-10-24 PROCEDURE — 11102 TANGNTL BX SKIN SINGLE LES: CPT | Performed by: FAMILY MEDICINE

## 2019-10-24 ASSESSMENT — MIFFLIN-ST. JEOR: SCORE: 1361.7

## 2019-10-24 NOTE — PROGRESS NOTES
"SUBJECTIVE: Camacho Covington is a 59 year old male  No chief complaint on file.     Derm      Duration: has been there for past year    Description (location/character/radiation): area on his back, changed is size did have a scab and fell off.     Intensity:  mild    Accompanying signs and symptoms: itchy at tiimes    History (similar episodes/previous evaluation): None    Precipitating or alleviating factors: None    Therapies tried and outcome: None       Problem 2:   Last clinic visit on September 27.  He has severe COPD documented by pulmonary function testing done on October 16.  He also had a exercise oximetry and desaturation to 87% with walking.  On 2 L oxygen his O2 saturations remained in the mid 90s.  Breathing about the same.    Not on oxygen.  Currently on combivent four times daily.  Does help.  Using albuteral inhaler a lot less.     Patient Active Problem List   Diagnosis     Pulmonary emphysema (H)     Essential hypertension with goal blood pressure less than 140/90     CARDIOVASCULAR SCREENING; LDL GOAL LESS THAN 130     Tobacco abuse     Hyponatremia     Skin lesion      ROS:BP at home has been good 117-129/71    OBJECTIVE:Blood pressure (!) 150/70, pulse 76, temperature 97.4  F (36.3  C), temperature source Tympanic, resp. rate 22, height 1.715 m (5' 7.5\"), weight 58.5 kg (129 lb), SpO2 96 %. BMI=Body mass index is 19.91 kg/m .  GENERAL APPEARANCE ADULT: Alert, no acute distress  SKIN: 8mm crusted sot lower left back with erythematous base.    Recheck blood pressure 150/70 left and 146/64 right     ASSESSMENT:   (L98.9) Skin lesion  (primary encounter diagnosis)  Comment: possible skin cancer vs AK  Plan: Informed consent was obtained.    Skin cleansing with alcohol    1% Lidocaine with epinephrine for local anesthetic.     With sterile technique, shave excision was performed.  Electrocautery to the base of the lesion.  Dressing bandaid    Wound care instructions:     Recheck with any signs of " wound infection or problems like redness, tenderness or a lot of swelling.  The procedure was well tolerated without complications.  Follow up:The specimen is labelled and sent to pathology for evaluation., Recheck with any sign of wound infection like redness, increasing pain or swelling.      (Z12.11) Screening for colon cancer  Comment:   Plan: Fecal colorectal cancer screen FIT        Complete FIT colon cancer screening test and send in the mail.      (J43.9) Pulmonary emphysema, unspecified emphysema type (H)  Comment:   Plan: He should qualify for oxygen with his desaturations in the exercise walk study.  I recommend continuous oxygen at night daytime as needed particularly with exercise.  Would benefit from portable unit to be allowed to be active during the daytime.  We will work on getting him the oxygen equipment needed.  Will be setting up appointment with pulmonology.  He will continue the Combivent 4 times daily and albuterol inhaler as needed.    (I10) Essential hypertension with goal blood pressure less than 140/90  Comment: The high today but consistently good at home.  Plan: No change    (Z72.0) Tobacco abuse  Comment: He continues to work on quitting smoking.  He notes some grogginess with this Chantix.  He wanted to continue the Chantix for another week if this is not helping we could try switching to bupropion.

## 2019-10-24 NOTE — NURSING NOTE
"No chief complaint on file.      Initial BP (!) 150/70   Pulse 76   Temp 97.4  F (36.3  C) (Tympanic)   Resp 22   Ht 1.715 m (5' 7.5\")   Wt 58.5 kg (129 lb)   SpO2 96%   BMI 19.91 kg/m   Estimated body mass index is 19.91 kg/m  as calculated from the following:    Height as of this encounter: 1.715 m (5' 7.5\").    Weight as of this encounter: 58.5 kg (129 lb).    Patient presents to the clinic using No DME    Health Maintenance that is potentially due pending provider review:  Colonoscopy/FIT    FIT given    Is there anyone who you would like to be able to receive your results?   If yes have patient fill out TEE      "

## 2019-10-24 NOTE — TELEPHONE ENCOUNTER
Oxygen orders generated per VO from Dr Ponce and faxed to [facility]. Nicole Barrientos RN

## 2019-10-29 NOTE — TELEPHONE ENCOUNTER
Jaye, oxygen orders to be filled out/signed by physician sent to PCP.    Dawna Gonzalez  [facility]

## 2019-10-31 ENCOUNTER — MEDICAL CORRESPONDENCE (OUTPATIENT)
Dept: HEALTH INFORMATION MANAGEMENT | Facility: CLINIC | Age: 59
End: 2019-10-31

## 2019-10-31 LAB — COPATH REPORT: NORMAL

## 2019-10-31 NOTE — RESULT ENCOUNTER NOTE
ELIA Sauer,   The skin spot on your back was not cancer.    PLAN: No additional treatment needed.   KELLEY FERGUSON MD

## 2019-11-04 DIAGNOSIS — Z12.11 SCREENING FOR COLON CANCER: ICD-10-CM

## 2019-11-04 PROCEDURE — 82274 ASSAY TEST FOR BLOOD FECAL: CPT | Performed by: FAMILY MEDICINE

## 2019-11-08 LAB — HEMOCCULT STL QL IA: NEGATIVE

## 2019-11-10 NOTE — RESULT ENCOUNTER NOTE
Parminder Sauer,  The colon cancer screening test (FIT test) is negative for sign of colon cancer.    PLAN: Repeat once every year.    KELLEY FERGUSON MD

## 2019-12-27 ENCOUNTER — ALLIED HEALTH/NURSE VISIT (OUTPATIENT)
Dept: FAMILY MEDICINE | Facility: CLINIC | Age: 59
End: 2019-12-27
Payer: COMMERCIAL

## 2019-12-27 VITALS — SYSTOLIC BLOOD PRESSURE: 138 MMHG | DIASTOLIC BLOOD PRESSURE: 70 MMHG

## 2019-12-27 DIAGNOSIS — Z01.30 BP CHECK: Primary | ICD-10-CM

## 2019-12-27 PROCEDURE — 99207 ZZC NO CHARGE NURSE ONLY: CPT | Performed by: FAMILY MEDICINE

## 2019-12-27 NOTE — PROGRESS NOTES
Camacho Covington was evaluated at [facility] on December 27, 2019 at which time his blood pressure was:    BP Readings from Last 3 Encounters:   12/27/19 138/70   10/24/19 (!) 150/70   09/27/19 130/66     Pulse Readings from Last 3 Encounters:   10/24/19 76   09/27/19 87   02/11/19 68       Reviewed lifestyle modifications for blood pressure control and reduction: including making healthy food choices, managing weight, getting regular exercise, smoking cessation, reducing alcohol consumption, monitoring blood pressure regularly.     Symptoms: None    BP Goal:< 140/90 mmHg    BP Assessment:  BP at goal    Potential Reasons for BP too high: NA - Not applicable    BP Follow-Up Plan: Recheck BP in 6 months at pharmacy    Recommendation to Provider: BP at goal follow up in 6 months.     Note completed by:   Segun Reddy, PharmD  [facility]

## 2020-01-01 DIAGNOSIS — I10 ESSENTIAL HYPERTENSION WITH GOAL BLOOD PRESSURE LESS THAN 140/90: ICD-10-CM

## 2020-01-01 RX ORDER — AMLODIPINE BESYLATE 5 MG/1
TABLET ORAL
Qty: 90 TABLET | Refills: 0 | Status: SHIPPED | OUTPATIENT
Start: 2020-01-01 | End: 2021-01-01

## 2020-02-17 DIAGNOSIS — I10 ESSENTIAL HYPERTENSION WITH GOAL BLOOD PRESSURE LESS THAN 140/90: ICD-10-CM

## 2020-02-17 DIAGNOSIS — Z13.6 CARDIOVASCULAR SCREENING; LDL GOAL LESS THAN 130: ICD-10-CM

## 2020-02-17 NOTE — TELEPHONE ENCOUNTER
"Requested Prescriptions   Pending Prescriptions Disp Refills     lisinopril (ZESTRIL) 20 MG tablet [Pharmacy Med Name: LISINOPRIL 20MG TABS] 30 tablet 11     Sig: TAKE ONE TABLET BY MOUTH ONCE DAILY       ACE Inhibitors (Including Combos) Protocol Failed - 2/17/2020  8:40 AM        Failed - Normal serum creatinine on file in past 12 months     Recent Labs   Lab Test 10/09/19  0807   CR 0.60*             Passed - Blood pressure under 140/90 in past 12 months     BP Readings from Last 3 Encounters:   12/27/19 138/70   10/24/19 (!) 150/70   09/27/19 130/66                 Passed - Recent (12 mo) or future (30 days) visit within the authorizing provider's specialty     Patient has had an office visit with the authorizing provider or a provider within the authorizing providers department within the previous 12 mos or has a future within next 30 days. See \"Patient Info\" tab in inbasket, or \"Choose Columns\" in Meds & Orders section of the refill encounter.              Passed - Medication is active on med list        Passed - Patient is age 18 or older        Passed - Normal serum potassium on file in past 12 months     Recent Labs   Lab Test 10/09/19  0807   POTASSIUM 4.9             atorvastatin (LIPITOR) 20 MG tablet [Pharmacy Med Name: ATORVASTATIN CALCIUM 20MG TABS] 30 tablet 11     Sig: TAKE ONE TABLET BY MOUTH ONCE DAILY       Statins Protocol Failed - 2/17/2020  8:40 AM        Failed - LDL on file in past 12 months     Recent Labs   Lab Test 02/11/19  1041   LDL 71             Passed - No abnormal creatine kinase in past 12 months     No lab results found.             Passed - Recent (12 mo) or future (30 days) visit within the authorizing provider's specialty     Patient has had an office visit with the authorizing provider or a provider within the authorizing providers department within the previous 12 mos or has a future within next 30 days. See \"Patient Info\" tab in inbasket, or \"Choose Columns\" in Meds & " Orders section of the refill encounter.              Passed - Medication is active on med list        Passed - Patient is age 18 or older      lisinopril (PRINIVIL/ZESTRIL) 20 MG tablet  Last Written Prescription Date:  02/11/2019  Last Fill Quantity: 30 tablet,  # refills: 11   Last office visit: 10/24/2019 with prescribing provider:  OWEN Ponce   Future Office Visit:      atorvastatin (LIPITOR) 20 MG tablet  Last Written Prescription Date:  02/11/2019  Last Fill Quantity: 30 tablet,  # refills: 11   Last office visit: 10/24/2019 with prescribing provider:  OWEN Ponce   Future Office Visit:      Cecille LA (R) (M)

## 2020-02-18 RX ORDER — ATORVASTATIN CALCIUM 20 MG/1
TABLET, FILM COATED ORAL
Qty: 30 TABLET | Refills: 11 | Status: SHIPPED | OUTPATIENT
Start: 2020-02-18 | End: 2020-11-20

## 2020-02-18 RX ORDER — LISINOPRIL 20 MG/1
TABLET ORAL
Qty: 30 TABLET | Refills: 11 | Status: SHIPPED | OUTPATIENT
Start: 2020-02-18 | End: 2020-11-20

## 2020-02-18 NOTE — TELEPHONE ENCOUNTER
Routing refill request to provider for review/approval because:  Labs not current:  See below    Marcie ROCHA RN

## 2020-03-09 DIAGNOSIS — J43.9 PULMONARY EMPHYSEMA, UNSPECIFIED EMPHYSEMA TYPE (H): ICD-10-CM

## 2020-03-09 RX ORDER — ALBUTEROL SULFATE 90 UG/1
AEROSOL, METERED RESPIRATORY (INHALATION)
Qty: 18 G | Refills: 11 | Status: SHIPPED | OUTPATIENT
Start: 2020-03-09 | End: 2020-11-20

## 2020-03-09 NOTE — TELEPHONE ENCOUNTER
"Requested Prescriptions   Pending Prescriptions Disp Refills     VENTOLIN  (90 Base) MCG/ACT inhaler [Pharmacy Med Name: VENTOLIN HFA 108MCG/ACT AERS] 18 g 11     Sig: INHALE 2 PUFFS INTO THE LUNGS EVERY 4 HOURS AS NEEDED FOR SHORTNESS OF BREATH, DIFFICULTY BREATHING OR WHEEZING.       Asthma Maintenance Inhalers - Anticholinergics Passed - 3/9/2020  8:43 AM        Passed - Patient is age 12 years or older        Passed - Recent (12 mo) or future (30 days) visit within the authorizing provider's specialty     Patient has had an office visit with the authorizing provider or a provider within the authorizing providers department within the previous 12 mos or has a future within next 30 days. See \"Patient Info\" tab in inbasket, or \"Choose Columns\" in Meds & Orders section of the refill encounter.              Passed - Medication is active on med list       Short-Acting Beta Agonist Inhalers Protocol  Passed - 3/9/2020  8:43 AM        Passed - Patient is age 12 or older        Passed - Recent (12 mo) or future (30 days) visit within the authorizing provider's specialty     Patient has had an office visit with the authorizing provider or a provider within the authorizing providers department within the previous 12 mos or has a future within next 30 days. See \"Patient Info\" tab in inbasket, or \"Choose Columns\" in Meds & Orders section of the refill encounter.              Passed - Medication is active on med list           Last Written Prescription Date:  7/3/19  Last Fill Quantity: 18g,  # refills: 11   Last office visit: 10/24/2019 with prescribing provider:     Future Office Visit:      No flowsheet data found.ACT3    "

## 2020-03-11 ENCOUNTER — HEALTH MAINTENANCE LETTER (OUTPATIENT)
Age: 60
End: 2020-03-11

## 2020-03-27 DIAGNOSIS — I10 ESSENTIAL HYPERTENSION WITH GOAL BLOOD PRESSURE LESS THAN 140/90: ICD-10-CM

## 2020-03-27 RX ORDER — AMLODIPINE BESYLATE 5 MG/1
TABLET ORAL
Qty: 90 TABLET | Refills: 1 | Status: SHIPPED | OUTPATIENT
Start: 2020-03-27 | End: 2020-09-21

## 2020-03-27 NOTE — TELEPHONE ENCOUNTER
"Requested Prescriptions   Pending Prescriptions Disp Refills     amLODIPine (NORVASC) 5 MG tablet [Pharmacy Med Name: AMLODIPINE BESYLATE 5MG TABS] 90 tablet 1     Sig: TAKE ONE TABLET BY MOUTH ONCE DAILY.       Calcium Channel Blockers Protocol  Failed - 3/27/2020  8:53 AM        Failed - Normal serum creatinine on file in past 12 months     Recent Labs   Lab Test 10/09/19  0807   CR 0.60*       Ok to refill medication if creatinine is low          Passed - Blood pressure under 140/90 in past 12 months     BP Readings from Last 3 Encounters:   12/27/19 138/70   10/24/19 (!) 150/70   09/27/19 130/66                 Passed - Recent (12 mo) or future (30 days) visit within the authorizing provider's specialty     Patient has had an office visit with the authorizing provider or a provider within the authorizing providers department within the previous 12 mos or has a future within next 30 days. See \"Patient Info\" tab in inbasket, or \"Choose Columns\" in Meds & Orders section of the refill encounter.              Passed - Medication is active on med list        Passed - Patient is age 18 or older         Last Written Prescription Date:  9/30/2019  Last Fill Quantity: 90,  # refills: 1   Last office visit: 10/24/2019 with prescribing provider:     Future Office Visit:    "

## 2020-08-26 ENCOUNTER — MEDICAL CORRESPONDENCE (OUTPATIENT)
Dept: HEALTH INFORMATION MANAGEMENT | Facility: CLINIC | Age: 60
End: 2020-08-26

## 2020-08-31 ENCOUNTER — TELEPHONE (OUTPATIENT)
Dept: FAMILY MEDICINE | Facility: CLINIC | Age: 60
End: 2020-08-31

## 2020-09-04 ENCOUNTER — TELEPHONE (OUTPATIENT)
Dept: FAMILY MEDICINE | Facility: CLINIC | Age: 60
End: 2020-09-04

## 2020-09-09 ENCOUNTER — MEDICAL CORRESPONDENCE (OUTPATIENT)
Dept: HEALTH INFORMATION MANAGEMENT | Facility: CLINIC | Age: 60
End: 2020-09-09

## 2020-09-21 DIAGNOSIS — I10 ESSENTIAL HYPERTENSION WITH GOAL BLOOD PRESSURE LESS THAN 140/90: ICD-10-CM

## 2020-09-21 RX ORDER — AMLODIPINE BESYLATE 5 MG/1
TABLET ORAL
Qty: 90 TABLET | Refills: 0 | Status: SHIPPED | OUTPATIENT
Start: 2020-09-21 | End: 2020-01-01

## 2020-09-29 DIAGNOSIS — J43.9 PULMONARY EMPHYSEMA, UNSPECIFIED EMPHYSEMA TYPE (H): ICD-10-CM

## 2020-09-29 RX ORDER — IPRATROPIUM BROMIDE AND ALBUTEROL 20; 100 UG/1; UG/1
SPRAY, METERED RESPIRATORY (INHALATION)
Qty: 4 G | Refills: 0 | Status: SHIPPED | OUTPATIENT
Start: 2020-09-29 | End: 2020-11-02

## 2020-09-29 NOTE — TELEPHONE ENCOUNTER
"Medication is being filled for 1 time refill only due to:  Due for Physical. Last OV 10/24/19.    Requested Prescriptions   Pending Prescriptions Disp Refills     COMBIVENT RESPIMAT  MCG/ACT inhaler [Pharmacy Med Name: COMBIVENT RESPIMAT  AERS] 4 g 11     Sig: INHALE ONE PUFF BY MOUTH FOUR TIMES A DAY       Asthma Maintenance Inhalers - Anticholinergics Passed - 9/29/2020  9:18 AM        Passed - Patient is age 12 years or older        Passed - Recent (12 mo) or future (30 days) visit within the authorizing provider's specialty     Patient has had an office visit with the authorizing provider or a provider within the authorizing providers department within the previous 12 mos or has a future within next 30 days. See \"Patient Info\" tab in inbasket, or \"Choose Columns\" in Meds & Orders section of the refill encounter.              Passed - Medication is active on med list       Short-Acting Beta Agonist Inhalers Protocol  Passed - 9/29/2020  9:18 AM        Passed - Patient is age 12 or older        Passed - Recent (12 mo) or future (30 days) visit within the authorizing provider's specialty     Patient has had an office visit with the authorizing provider or a provider within the authorizing providers department within the previous 12 mos or has a future within next 30 days. See \"Patient Info\" tab in inbasket, or \"Choose Columns\" in Meds & Orders section of the refill encounter.              Passed - Medication is active on med list       Asthma Nebs Protocol Passed - 9/29/2020  9:18 AM        Passed - Patient is age 4 years or older        Passed - Recent (12 mo) or future (30 days) visit within the authorizing provider's specialty     Patient has had an office visit with the authorizing provider or a provider within the authorizing providers department within the previous 12 mos or has a future within next 30 days. See \"Patient Info\" tab in inbasket, or \"Choose Columns\" in Meds & Orders section of the " refill encounter.              Passed - Medication is active on med list           Marcie ROCHA RN, BSN

## 2020-10-30 DIAGNOSIS — J43.9 PULMONARY EMPHYSEMA, UNSPECIFIED EMPHYSEMA TYPE (H): ICD-10-CM

## 2020-10-30 NOTE — TELEPHONE ENCOUNTER
Routing refill request to provider for review/approval because:  Riddhi given x1 and patient did not follow up, please advise  Patient needs to be seen because it has been more than 1 year since last office visit.    Marcie ROCHA RN, BSN

## 2020-11-02 DIAGNOSIS — J43.9 PULMONARY EMPHYSEMA, UNSPECIFIED EMPHYSEMA TYPE (H): ICD-10-CM

## 2020-11-02 NOTE — TELEPHONE ENCOUNTER
Patient notified and understood 11/2/20  Pt said he will call and have his wife make appt for him.  Chely [place] Sec

## 2020-11-03 RX ORDER — IPRATROPIUM BROMIDE AND ALBUTEROL 20; 100 UG/1; UG/1
SPRAY, METERED RESPIRATORY (INHALATION)
Qty: 4 G | Refills: 0 | Status: SHIPPED | OUTPATIENT
Start: 2020-11-03 | End: 2020-11-20

## 2020-11-03 RX ORDER — IPRATROPIUM BROMIDE AND ALBUTEROL 20; 100 UG/1; UG/1
SPRAY, METERED RESPIRATORY (INHALATION)
Qty: 4 G | Refills: 0 | Status: SHIPPED | OUTPATIENT
Start: 2020-11-03 | End: 2021-01-01

## 2020-11-20 ENCOUNTER — OFFICE VISIT (OUTPATIENT)
Dept: FAMILY MEDICINE | Facility: CLINIC | Age: 60
End: 2020-11-20
Payer: COMMERCIAL

## 2020-11-20 VITALS
TEMPERATURE: 98.1 F | HEART RATE: 78 BPM | DIASTOLIC BLOOD PRESSURE: 80 MMHG | OXYGEN SATURATION: 98 % | SYSTOLIC BLOOD PRESSURE: 158 MMHG

## 2020-11-20 DIAGNOSIS — E87.1 HYPONATREMIA: ICD-10-CM

## 2020-11-20 DIAGNOSIS — E78.5 HYPERLIPIDEMIA LDL GOAL <100: ICD-10-CM

## 2020-11-20 DIAGNOSIS — Z13.6 CARDIOVASCULAR SCREENING; LDL GOAL LESS THAN 130: ICD-10-CM

## 2020-11-20 DIAGNOSIS — J43.9 PULMONARY EMPHYSEMA, UNSPECIFIED EMPHYSEMA TYPE (H): Primary | ICD-10-CM

## 2020-11-20 DIAGNOSIS — I10 ESSENTIAL HYPERTENSION WITH GOAL BLOOD PRESSURE LESS THAN 140/90: ICD-10-CM

## 2020-11-20 DIAGNOSIS — Z72.0 TOBACCO ABUSE: ICD-10-CM

## 2020-11-20 DIAGNOSIS — R91.8 PULMONARY NODULES: ICD-10-CM

## 2020-11-20 LAB
ANION GAP SERPL CALCULATED.3IONS-SCNC: 4 MMOL/L (ref 3–14)
BUN SERPL-MCNC: 12 MG/DL (ref 7–30)
CALCIUM SERPL-MCNC: 9.4 MG/DL (ref 8.5–10.1)
CHLORIDE SERPL-SCNC: 89 MMOL/L (ref 94–109)
CHOLEST SERPL-MCNC: 174 MG/DL
CO2 SERPL-SCNC: 33 MMOL/L (ref 20–32)
CREAT SERPL-MCNC: 0.54 MG/DL (ref 0.66–1.25)
GFR SERPL CREATININE-BSD FRML MDRD: >90 ML/MIN/{1.73_M2}
GLUCOSE SERPL-MCNC: 109 MG/DL (ref 70–99)
HDLC SERPL-MCNC: 90 MG/DL
LDLC SERPL CALC-MCNC: 74 MG/DL
NONHDLC SERPL-MCNC: 84 MG/DL
POTASSIUM SERPL-SCNC: 4.7 MMOL/L (ref 3.4–5.3)
SODIUM SERPL-SCNC: 126 MMOL/L (ref 133–144)
TRIGL SERPL-MCNC: 48 MG/DL

## 2020-11-20 PROCEDURE — 80048 BASIC METABOLIC PNL TOTAL CA: CPT | Performed by: FAMILY MEDICINE

## 2020-11-20 PROCEDURE — 99214 OFFICE O/P EST MOD 30 MIN: CPT | Performed by: FAMILY MEDICINE

## 2020-11-20 PROCEDURE — 80061 LIPID PANEL: CPT | Performed by: FAMILY MEDICINE

## 2020-11-20 PROCEDURE — 36415 COLL VENOUS BLD VENIPUNCTURE: CPT | Performed by: FAMILY MEDICINE

## 2020-11-20 RX ORDER — IPRATROPIUM BROMIDE AND ALBUTEROL 20; 100 UG/1; UG/1
SPRAY, METERED RESPIRATORY (INHALATION)
Qty: 4 G | Refills: 11 | Status: SHIPPED | OUTPATIENT
Start: 2020-11-20

## 2020-11-20 RX ORDER — ALBUTEROL SULFATE 90 UG/1
AEROSOL, METERED RESPIRATORY (INHALATION)
Qty: 18 G | Refills: 11 | Status: SHIPPED | OUTPATIENT
Start: 2020-11-20

## 2020-11-20 RX ORDER — LISINOPRIL 20 MG/1
20 TABLET ORAL DAILY
Qty: 90 TABLET | Refills: 3 | Status: SHIPPED | OUTPATIENT
Start: 2020-11-20

## 2020-11-20 RX ORDER — NICOTINE 21 MG/24HR
1 PATCH, TRANSDERMAL 24 HOURS TRANSDERMAL EVERY 24 HOURS
Qty: 30 PATCH | Refills: 5 | Status: SHIPPED | OUTPATIENT
Start: 2020-11-20 | End: 2021-01-01

## 2020-11-20 RX ORDER — ATORVASTATIN CALCIUM 20 MG/1
20 TABLET, FILM COATED ORAL DAILY
Qty: 90 TABLET | Refills: 3 | Status: SHIPPED | OUTPATIENT
Start: 2020-11-20

## 2020-11-20 NOTE — PROGRESS NOTES
"Subjective     Camacho Covington is a 60 year old male who presents to clinic today for the following health issues:  Chief Complaint   Patient presents with     COPD     recheck/refill       Accompanied today by spouse Amy    Last clinic visit: 10/24/2019 for COPD and skin lesion.    Today needs refills.     No other health concerns today.      COPD Follow-Up    Overall, how are your COPD symptoms since your last clinic visit?  Slightly worse    How much fatigue or shortness of breath do you have when you are walking?  More than usual    How much shortness of breath do you have when you are resting?  Same as usual    How often do you cough? Often    Have you noticed any change in your sputum/phlegm?  No    Have you experienced a recent fever? No    Please describe how far you can walk without stopping to rest:  The length of 1-2 rooms    How many flights of stairs are you able to walk up without stopping?  1  Have you had any Emergency Room Visits, Urgent Care Visits, or Hospital Admissions because of your COPD since your last office visit?  No    Breathing a little worse in the past year.   He feels \"comfortable most of the time\".  Some cough.  Sometimes has to clear phlegm.    He has been on continuous oxygen at 1 and 1/2  Liters O2.    He uses Combivent four times daily on a regular basis.    Uses albuteral inhaler 4-5 times a day.   Feels hot at times.   He has seen pulmonology a couple times.   He has not been in pulmonary rehab.    He has difficulty going out. He has shortness of breath just getting dressed.  He walks around the house.  Uses wheelchair when going out or has to walk any distance.    HE tried umeclidinium (Incruse ellipta) but the powder made him cough.   O2 sats usually 95-96% at home.   Tobacco:somes 4-5 cigs per day.   He has tried Chantix numerous times.  He has used nicorette gum, not patches.     He has been checking blood pressure at home.  Readings in the past year:103-142/61-79.  Pulses " 60s.  2/12 readings were over 140/90.    History   Smoking Status     Current Every Day Smoker     Packs/day: 0.50     Years: 44.00     Types: Cigarettes   Smokeless Tobacco     Never Used     Comment: started at age 13,  3-4 cigs daily     No results found for: FEV1, BHC0ESK      How many servings of fruits and vegetables do you eat daily?  2-3    On average, how many sweetened beverages do you drink each day (Examples: soda, juice, sweet tea, etc.  Do NOT count diet or artificially sweetened beverages)?   1    How many days per week do you exercise enough to make your heart beat faster? none    How many minutes a day do you exercise enough to make your heart beat faster? none    How many days per week do you miss taking your medication? 0      Patient Active Problem List   Diagnosis     Pulmonary emphysema (H)     Essential hypertension with goal blood pressure less than 140/90     CARDIOVASCULAR SCREENING; LDL GOAL LESS THAN 130     Tobacco abuse     Hyponatremia     Skin lesion      ROS:General: POSITIVE for:, decreased appetite.  He feels weight is down a little.  Sleeps a couple hours then up.  Up often at night.  Can get back to sleep.    Resp: see above   CV: No chest pains or palpitations  GI: POSITIVE for:, change in bowel habits, constipation-bowel movement q 3-4 days, usually softer.   : No urinary frequency or dysuria, bladder or kidney problems  Musculoskeletal: No significant muscle or joint pains  Psychiatric: No problems with anxiety, depression or mental health    OBJECTIVE:Blood pressure (!) 158/80, pulse 78, temperature 98.1  F (36.7  C), temperature source Tympanic, SpO2 98 %. BMI=There is no height or weight on file to calculate BMI.  GENERAL APPEARANCE ADULT: alert, frail, seated in a wheelchair with oxygen nasal cannula.   NECK: No adenopathy,masses or thyromegaly  RESP: lungs clear to auscultation , prolonged expiratory phase, diminished breath sounds throughout  CV: normal rate, regular  rhythm, no murmur or gallop  MS: extremities normal, no peripheral edema   Recheck DN=234/64    ASSESSMENT:   (J43.9) Pulmonary emphysema, unspecified emphysema type (H)  (primary encounter diagnosis)  Comment: chronic.  He has not been interested in pulmonary rehab or pulmonology consult so far.  Does not like to leave the house.    Plan: ipratropium-albuterol (COMBIVENT RESPIMAT)          MCG/ACT inhaler, albuterol (VENTOLIN         HFA) 108 (90 Base) MCG/ACT inhaler,         mometasone-formoterol (DULERA) 200-5 MCG/ACT         inhaler        Continue the Combivent four times daily   Albuteral inhaler can be used taking 2 inhalations every 4 hours as needed for coughing, wheezing or shortness of breath.   Try adding Dulera 2 inhalations twice daily on a regular basis.   Quit smoking.   Consider pulmonary rehab.  We can do referral.   Consider pulmonology consult.  We can do referral if desired.     (Z72.0) Tobacco abuse  Comment:   Plan: nicotine (NICODERM CQ) 21 MG/24HR 24 hr patch        Place 21mg nicotine patches once daily for 6-8 weeks, then 14mg patches once daily for 2 weeks and finally 7mg patches once daily for 2 weeks.     (E87.1) Hyponatremia  Comment:   Plan: Basic metabolic panel        Recheck today.     (I10) Essential hypertension with goal blood pressure less than 140/90  Comment: doing well at home.  High here today.   Plan: lisinopril (ZESTRIL) 20 MG tablet        No change in current treatment plan.  Refills.     (R91.8) Pulmonary nodules  Comment: due for lung CT  Plan: I recommend follow-up lung CT.  You preferred to wait.  Let me know when you would like to schedule.     (E78.5) Hyperlipidemia LDL goal <100  Comment: due for follow-up   Plan: Lipid panel reflex to direct LDL Fasting        Fasting blood  Tests today.  Refills.    Current on vaccinations.

## 2020-11-20 NOTE — PATIENT INSTRUCTIONS
ASSESSMENT:   (J43.9) Pulmonary emphysema, unspecified emphysema type (H)  (primary encounter diagnosis)  Comment: chronic  Plan: ipratropium-albuterol (COMBIVENT RESPIMAT)          MCG/ACT inhaler, albuterol (VENTOLIN         HFA) 108 (90 Base) MCG/ACT inhaler,         mometasone-formoterol (DULERA) 200-5 MCG/ACT         inhaler        Continue the Combivent four times daily   Albuteral inhaler can be used taking 2 inhalations every 4 hours as needed for coughing, wheezing or shortness of breath.   Try adding Dulera 2 inhalations twice daily on a regular basis.   Quit smoking.   Consider pulmonary rehab.  We can do referral.   Consider pulmonology consult.  We can do referral if desired.     (Z72.0) Tobacco abuse  Comment:   Plan: nicotine (NICODERM CQ) 21 MG/24HR 24 hr patch        Place 21mg nicotine patches once daily for 6-8 weeks, then 14mg patches once daily for 2 weeks and finally 7mg patches once daily for 2 weeks.     (E87.1) Hyponatremia  Comment:   Plan: Basic metabolic panel        Recheck today.     (I10) Essential hypertension with goal blood pressure less than 140/90  Comment: doing well at home.  High here today.   Plan: lisinopril (ZESTRIL) 20 MG tablet        No change in current treatment plan.  Refills.     (R91.8) Pulmonary nodules  Comment: due for lung CT  Plan: I recommend follow-up lung CT.  You preferred to wait.  Let me know when you would like to schedule.     (E78.5) Hyperlipidemia LDL goal <100  Comment: due for follow-up   Plan: Lipid panel reflex to direct LDL Fasting        Fasting blood  Tests today.  Refills.    Current on vaccinations.

## 2020-11-22 NOTE — RESULT ENCOUNTER NOTE
"Please call.  See if he will do chest CT and discuss the fluid restrictions and recheck chem8 in 1-2 weeks.     Parminder Sauer,  Lipid tests including total cholesterol, triglycerides, HDL (\"good cholesterol\") and LDL (\"bad cholesterol\") are normal.    Your sodium is low which has been present in the past.    Glucose a little increased in pre-diabetes range.   PLAN: low sodium can affect your energy, strength and sometime thinking if too low.  It is probably low due to your lung disease but lung cancer can also cause.   I do recommend doing the Chest CT to make sure there is no lung cancer.   Limiting fluids to about a 3-4 8oz glasses (25-33oz) of liquid a day can help improve sodium.  If this does not help in the next 1-2 weeks, we could try adding sodium tablets.  Check another blood count in 1-2 weeks.   I will have my staff contact you about the CT and the fluid restriction.    KELLEY FERGUSON MD"

## 2021-01-01 ENCOUNTER — HOSPITAL ENCOUNTER (INPATIENT)
Facility: CLINIC | Age: 61
LOS: 8 days | DRG: 189 | End: 2021-12-13
Attending: EMERGENCY MEDICINE | Admitting: INTERNAL MEDICINE
Payer: COMMERCIAL

## 2021-01-01 ENCOUNTER — TELEPHONE (OUTPATIENT)
Dept: FAMILY MEDICINE | Facility: CLINIC | Age: 61
End: 2021-01-01
Payer: COMMERCIAL

## 2021-01-01 ENCOUNTER — IMMUNIZATION (OUTPATIENT)
Dept: FAMILY MEDICINE | Facility: CLINIC | Age: 61
End: 2021-01-01
Payer: COMMERCIAL

## 2021-01-01 ENCOUNTER — OFFICE VISIT (OUTPATIENT)
Dept: PULMONOLOGY | Facility: OTHER | Age: 61
End: 2021-01-01
Payer: COMMERCIAL

## 2021-01-01 ENCOUNTER — OFFICE VISIT (OUTPATIENT)
Dept: UROLOGY | Facility: CLINIC | Age: 61
End: 2021-01-01
Payer: COMMERCIAL

## 2021-01-01 ENCOUNTER — APPOINTMENT (OUTPATIENT)
Dept: GENERAL RADIOLOGY | Facility: CLINIC | Age: 61
DRG: 189 | End: 2021-01-01
Attending: INTERNAL MEDICINE
Payer: COMMERCIAL

## 2021-01-01 ENCOUNTER — APPOINTMENT (OUTPATIENT)
Dept: GENERAL RADIOLOGY | Facility: CLINIC | Age: 61
DRG: 189 | End: 2021-01-01
Attending: FAMILY MEDICINE
Payer: COMMERCIAL

## 2021-01-01 ENCOUNTER — TELEPHONE (OUTPATIENT)
Dept: UROLOGY | Facility: CLINIC | Age: 61
End: 2021-01-01

## 2021-01-01 ENCOUNTER — HOSPITAL ENCOUNTER (INPATIENT)
Facility: CLINIC | Age: 61
LOS: 2 days | Discharge: HOME | DRG: 189 | End: 2021-03-27
Attending: FAMILY MEDICINE | Admitting: FAMILY MEDICINE
Payer: COMMERCIAL

## 2021-01-01 ENCOUNTER — APPOINTMENT (OUTPATIENT)
Dept: CARDIOLOGY | Facility: CLINIC | Age: 61
DRG: 871 | End: 2021-01-01
Attending: NURSE PRACTITIONER
Payer: COMMERCIAL

## 2021-01-01 ENCOUNTER — TELEPHONE (OUTPATIENT)
Dept: PULMONOLOGY | Facility: OTHER | Age: 61
End: 2021-01-01

## 2021-01-01 ENCOUNTER — PATIENT OUTREACH (OUTPATIENT)
Dept: CARE COORDINATION | Facility: CLINIC | Age: 61
End: 2021-01-01

## 2021-01-01 ENCOUNTER — TELEPHONE (OUTPATIENT)
Dept: FAMILY MEDICINE | Facility: CLINIC | Age: 61
End: 2021-01-01

## 2021-01-01 ENCOUNTER — APPOINTMENT (OUTPATIENT)
Dept: CT IMAGING | Facility: CLINIC | Age: 61
End: 2021-01-01
Attending: EMERGENCY MEDICINE
Payer: COMMERCIAL

## 2021-01-01 ENCOUNTER — AMBULATORY - HEALTHEAST (OUTPATIENT)
Dept: PULMONOLOGY | Facility: OTHER | Age: 61
End: 2021-01-01

## 2021-01-01 ENCOUNTER — APPOINTMENT (OUTPATIENT)
Dept: PHYSICAL THERAPY | Facility: CLINIC | Age: 61
DRG: 871 | End: 2021-01-01
Attending: HOSPITALIST
Payer: COMMERCIAL

## 2021-01-01 ENCOUNTER — VIRTUAL VISIT (OUTPATIENT)
Dept: PULMONOLOGY | Facility: OTHER | Age: 61
End: 2021-01-01
Payer: COMMERCIAL

## 2021-01-01 ENCOUNTER — VIRTUAL VISIT (OUTPATIENT)
Dept: FAMILY MEDICINE | Facility: CLINIC | Age: 61
End: 2021-01-01
Payer: COMMERCIAL

## 2021-01-01 ENCOUNTER — OFFICE VISIT (OUTPATIENT)
Dept: FAMILY MEDICINE | Facility: CLINIC | Age: 61
End: 2021-01-01
Payer: COMMERCIAL

## 2021-01-01 ENCOUNTER — HOSPITAL ENCOUNTER (EMERGENCY)
Facility: CLINIC | Age: 61
Discharge: HOME | End: 2021-04-03
Attending: EMERGENCY MEDICINE | Admitting: EMERGENCY MEDICINE
Payer: COMMERCIAL

## 2021-01-01 ENCOUNTER — APPOINTMENT (OUTPATIENT)
Dept: CT IMAGING | Facility: CLINIC | Age: 61
End: 2021-01-01
Attending: FAMILY MEDICINE
Payer: COMMERCIAL

## 2021-01-01 ENCOUNTER — COMMUNICATION - HEALTHEAST (OUTPATIENT)
Dept: PULMONOLOGY | Facility: OTHER | Age: 61
End: 2021-01-01

## 2021-01-01 ENCOUNTER — APPOINTMENT (OUTPATIENT)
Dept: GENERAL RADIOLOGY | Facility: CLINIC | Age: 61
DRG: 189 | End: 2021-01-01
Attending: EMERGENCY MEDICINE
Payer: COMMERCIAL

## 2021-01-01 ENCOUNTER — HOSPITAL ENCOUNTER (INPATIENT)
Facility: CLINIC | Age: 61
LOS: 5 days | Discharge: HOME | DRG: 871 | End: 2021-01-10
Attending: STUDENT IN AN ORGANIZED HEALTH CARE EDUCATION/TRAINING PROGRAM | Admitting: HOSPITALIST
Payer: COMMERCIAL

## 2021-01-01 ENCOUNTER — IMMUNIZATION (OUTPATIENT)
Dept: FAMILY MEDICINE | Facility: CLINIC | Age: 61
End: 2021-01-01
Attending: FAMILY MEDICINE
Payer: COMMERCIAL

## 2021-01-01 ENCOUNTER — PRE VISIT (OUTPATIENT)
Dept: ONCOLOGY | Facility: CLINIC | Age: 61
End: 2021-01-01

## 2021-01-01 ENCOUNTER — HEALTH MAINTENANCE LETTER (OUTPATIENT)
Age: 61
End: 2021-01-01

## 2021-01-01 ENCOUNTER — APPOINTMENT (OUTPATIENT)
Dept: CT IMAGING | Facility: CLINIC | Age: 61
DRG: 189 | End: 2021-01-01
Attending: FAMILY MEDICINE
Payer: COMMERCIAL

## 2021-01-01 ENCOUNTER — OFFICE VISIT - HEALTHEAST (OUTPATIENT)
Dept: PULMONOLOGY | Facility: OTHER | Age: 61
End: 2021-01-01

## 2021-01-01 ENCOUNTER — HOSPITAL ENCOUNTER (EMERGENCY)
Facility: CLINIC | Age: 61
Discharge: HOME | End: 2021-10-27
Attending: FAMILY MEDICINE | Admitting: FAMILY MEDICINE
Payer: COMMERCIAL

## 2021-01-01 ENCOUNTER — APPOINTMENT (OUTPATIENT)
Dept: GENERAL RADIOLOGY | Facility: CLINIC | Age: 61
End: 2021-01-01
Attending: EMERGENCY MEDICINE
Payer: COMMERCIAL

## 2021-01-01 ENCOUNTER — OFFICE VISIT (OUTPATIENT)
Dept: UROLOGY | Facility: CLINIC | Age: 61
End: 2021-01-01
Attending: INTERNAL MEDICINE
Payer: COMMERCIAL

## 2021-01-01 ENCOUNTER — RECORDS - HEALTHEAST (OUTPATIENT)
Dept: ADMINISTRATIVE | Facility: OTHER | Age: 61
End: 2021-01-01

## 2021-01-01 ENCOUNTER — VIRTUAL VISIT (OUTPATIENT)
Dept: UROLOGY | Facility: CLINIC | Age: 61
End: 2021-01-01
Payer: COMMERCIAL

## 2021-01-01 ENCOUNTER — NURSE TRIAGE (OUTPATIENT)
Dept: NURSING | Facility: CLINIC | Age: 61
End: 2021-01-01

## 2021-01-01 ENCOUNTER — RECORDS - HEALTHEAST (OUTPATIENT)
Dept: RADIOLOGY | Facility: CLINIC | Age: 61
End: 2021-01-01

## 2021-01-01 ENCOUNTER — HOSPITAL ENCOUNTER (OUTPATIENT)
Dept: RESPIRATORY THERAPY | Facility: CLINIC | Age: 61
Discharge: HOME | End: 2021-04-21
Attending: INTERNAL MEDICINE | Admitting: INTERNAL MEDICINE
Payer: COMMERCIAL

## 2021-01-01 ENCOUNTER — HOSPITAL ENCOUNTER (EMERGENCY)
Facility: CLINIC | Age: 61
Discharge: ANOTHER HOSPITAL | End: 2021-01-05
Attending: EMERGENCY MEDICINE | Admitting: EMERGENCY MEDICINE
Payer: COMMERCIAL

## 2021-01-01 VITALS — SYSTOLIC BLOOD PRESSURE: 138 MMHG | DIASTOLIC BLOOD PRESSURE: 73 MMHG | TEMPERATURE: 97.3 F | HEART RATE: 87 BPM

## 2021-01-01 VITALS — HEIGHT: 68 IN | BODY MASS INDEX: 21.22 KG/M2 | WEIGHT: 140 LBS

## 2021-01-01 VITALS
OXYGEN SATURATION: 90 % | DIASTOLIC BLOOD PRESSURE: 78 MMHG | RESPIRATION RATE: 22 BRPM | TEMPERATURE: 98.6 F | HEIGHT: 67 IN | SYSTOLIC BLOOD PRESSURE: 138 MMHG | HEART RATE: 90 BPM | BODY MASS INDEX: 23.36 KG/M2 | WEIGHT: 148.81 LBS

## 2021-01-01 VITALS
DIASTOLIC BLOOD PRESSURE: 64 MMHG | OXYGEN SATURATION: 93 % | WEIGHT: 133.8 LBS | SYSTOLIC BLOOD PRESSURE: 122 MMHG | BODY MASS INDEX: 20.65 KG/M2 | RESPIRATION RATE: 31 BRPM | TEMPERATURE: 97.7 F | HEART RATE: 92 BPM

## 2021-01-01 VITALS
HEART RATE: 100 BPM | OXYGEN SATURATION: 100 % | BODY MASS INDEX: 18.69 KG/M2 | SYSTOLIC BLOOD PRESSURE: 128 MMHG | DIASTOLIC BLOOD PRESSURE: 62 MMHG | WEIGHT: 134 LBS

## 2021-01-01 VITALS
WEIGHT: 136.02 LBS | OXYGEN SATURATION: 92 % | HEART RATE: 83 BPM | SYSTOLIC BLOOD PRESSURE: 122 MMHG | TEMPERATURE: 97.9 F | BODY MASS INDEX: 21.35 KG/M2 | RESPIRATION RATE: 14 BRPM | DIASTOLIC BLOOD PRESSURE: 95 MMHG | HEIGHT: 67 IN

## 2021-01-01 VITALS
DIASTOLIC BLOOD PRESSURE: 71 MMHG | OXYGEN SATURATION: 95 % | BODY MASS INDEX: 20.65 KG/M2 | RESPIRATION RATE: 16 BRPM | HEART RATE: 86 BPM | HEIGHT: 68 IN | SYSTOLIC BLOOD PRESSURE: 145 MMHG | TEMPERATURE: 98.3 F

## 2021-01-01 VITALS
SYSTOLIC BLOOD PRESSURE: 140 MMHG | OXYGEN SATURATION: 97 % | HEART RATE: 94 BPM | WEIGHT: 138.1 LBS | BODY MASS INDEX: 19.26 KG/M2 | DIASTOLIC BLOOD PRESSURE: 68 MMHG

## 2021-01-01 VITALS
DIASTOLIC BLOOD PRESSURE: 76 MMHG | BODY MASS INDEX: 21.3 KG/M2 | RESPIRATION RATE: 24 BRPM | OXYGEN SATURATION: 95 % | SYSTOLIC BLOOD PRESSURE: 128 MMHG | TEMPERATURE: 97.9 F | WEIGHT: 136 LBS | HEART RATE: 75 BPM

## 2021-01-01 VITALS
OXYGEN SATURATION: 96 % | TEMPERATURE: 98.2 F | DIASTOLIC BLOOD PRESSURE: 81 MMHG | HEART RATE: 99 BPM | SYSTOLIC BLOOD PRESSURE: 146 MMHG | RESPIRATION RATE: 18 BRPM

## 2021-01-01 VITALS
BODY MASS INDEX: 21.35 KG/M2 | TEMPERATURE: 98.4 F | WEIGHT: 136 LBS | SYSTOLIC BLOOD PRESSURE: 104 MMHG | RESPIRATION RATE: 18 BRPM | HEART RATE: 80 BPM | HEIGHT: 67 IN | DIASTOLIC BLOOD PRESSURE: 60 MMHG | OXYGEN SATURATION: 92 %

## 2021-01-01 VITALS
HEIGHT: 71 IN | OXYGEN SATURATION: 100 % | WEIGHT: 132 LBS | HEART RATE: 81 BPM | BODY MASS INDEX: 18.48 KG/M2 | SYSTOLIC BLOOD PRESSURE: 150 MMHG | DIASTOLIC BLOOD PRESSURE: 72 MMHG

## 2021-01-01 DIAGNOSIS — J43.9 PULMONARY EMPHYSEMA, UNSPECIFIED EMPHYSEMA TYPE (H): ICD-10-CM

## 2021-01-01 DIAGNOSIS — Z72.0 TOBACCO ABUSE: ICD-10-CM

## 2021-01-01 DIAGNOSIS — I10 ESSENTIAL HYPERTENSION WITH GOAL BLOOD PRESSURE LESS THAN 140/90: ICD-10-CM

## 2021-01-01 DIAGNOSIS — R33.8 BENIGN PROSTATIC HYPERPLASIA WITH URINARY RETENTION: ICD-10-CM

## 2021-01-01 DIAGNOSIS — N21.0 BLADDER STONES: ICD-10-CM

## 2021-01-01 DIAGNOSIS — Z11.52 ENCOUNTER FOR SCREENING LABORATORY TESTING FOR COVID-19 VIRUS: ICD-10-CM

## 2021-01-01 DIAGNOSIS — R33.9 URINARY RETENTION: ICD-10-CM

## 2021-01-01 DIAGNOSIS — J96.22 ACUTE ON CHRONIC RESPIRATORY FAILURE WITH HYPOXIA AND HYPERCAPNIA (H): ICD-10-CM

## 2021-01-01 DIAGNOSIS — R52 PAIN: ICD-10-CM

## 2021-01-01 DIAGNOSIS — J18.9 PNEUMONIA OF LEFT LOWER LOBE DUE TO INFECTIOUS ORGANISM: ICD-10-CM

## 2021-01-01 DIAGNOSIS — J43.9 PULMONARY EMPHYSEMA (H): ICD-10-CM

## 2021-01-01 DIAGNOSIS — M54.50 RIGHT-SIDED LOW BACK PAIN WITHOUT SCIATICA, UNSPECIFIED CHRONICITY: ICD-10-CM

## 2021-01-01 DIAGNOSIS — N32.0 BLADDER OUTLET OBSTRUCTION: ICD-10-CM

## 2021-01-01 DIAGNOSIS — J43.9 PULMONARY EMPHYSEMA, UNSPECIFIED EMPHYSEMA TYPE (H): Primary | ICD-10-CM

## 2021-01-01 DIAGNOSIS — J18.9 COMMUNITY ACQUIRED PNEUMONIA OF LEFT LOWER LOBE OF LUNG: ICD-10-CM

## 2021-01-01 DIAGNOSIS — E87.1 HYPONATREMIA: ICD-10-CM

## 2021-01-01 DIAGNOSIS — N40.1 BENIGN PROSTATIC HYPERPLASIA WITH URINARY RETENTION: ICD-10-CM

## 2021-01-01 DIAGNOSIS — J96.21 ACUTE ON CHRONIC RESPIRATORY FAILURE WITH HYPOXIA AND HYPERCAPNIA (H): ICD-10-CM

## 2021-01-01 DIAGNOSIS — M54.50 ACUTE RIGHT-SIDED LOW BACK PAIN WITHOUT SCIATICA: ICD-10-CM

## 2021-01-01 DIAGNOSIS — N40.1 BENIGN PROSTATIC HYPERPLASIA WITH URINARY RETENTION: Primary | ICD-10-CM

## 2021-01-01 DIAGNOSIS — J96.21 ACUTE ON CHRONIC RESPIRATORY FAILURE WITH HYPOXIA AND HYPERCAPNIA (H): Primary | ICD-10-CM

## 2021-01-01 DIAGNOSIS — R33.8 BENIGN PROSTATIC HYPERPLASIA WITH URINARY RETENTION: Primary | ICD-10-CM

## 2021-01-01 DIAGNOSIS — J44.9 COPD, SEVERE (H): Primary | ICD-10-CM

## 2021-01-01 DIAGNOSIS — R31.9 HEMATURIA, UNSPECIFIED TYPE: ICD-10-CM

## 2021-01-01 DIAGNOSIS — J44.9 STAGE 3 SEVERE COPD BY GOLD CLASSIFICATION (H): Primary | ICD-10-CM

## 2021-01-01 DIAGNOSIS — Z71.89 OTHER SPECIFIED COUNSELING: ICD-10-CM

## 2021-01-01 DIAGNOSIS — J96.22 ACUTE ON CHRONIC RESPIRATORY FAILURE WITH HYPOXIA AND HYPERCAPNIA (H): Primary | ICD-10-CM

## 2021-01-01 DIAGNOSIS — R60.0 PERIPHERAL EDEMA: ICD-10-CM

## 2021-01-01 DIAGNOSIS — N39.0 ACUTE URINARY TRACT INFECTION: ICD-10-CM

## 2021-01-01 DIAGNOSIS — N32.89 BLADDER WALL THICKENING: ICD-10-CM

## 2021-01-01 DIAGNOSIS — E87.1 HYPONATREMIA: Primary | ICD-10-CM

## 2021-01-01 DIAGNOSIS — J43.1 PANLOBULAR EMPHYSEMA (H): ICD-10-CM

## 2021-01-01 DIAGNOSIS — J96.21 ACUTE ON CHRONIC RESPIRATORY FAILURE WITH HYPOXIA (H): ICD-10-CM

## 2021-01-01 DIAGNOSIS — J44.9 STAGE 4 VERY SEVERE COPD BY GOLD CLASSIFICATION (H): ICD-10-CM

## 2021-01-01 DIAGNOSIS — J44.1 COPD EXACERBATION (H): ICD-10-CM

## 2021-01-01 DIAGNOSIS — J44.9 STAGE 4 VERY SEVERE COPD BY GOLD CLASSIFICATION (H): Primary | ICD-10-CM

## 2021-01-01 DIAGNOSIS — M54.50 ACUTE BILATERAL LOW BACK PAIN WITHOUT SCIATICA: ICD-10-CM

## 2021-01-01 DIAGNOSIS — R91.8 PULMONARY NODULES: ICD-10-CM

## 2021-01-01 DIAGNOSIS — J96.01 ACUTE RESPIRATORY FAILURE WITH HYPOXIA (H): ICD-10-CM

## 2021-01-01 DIAGNOSIS — N32.0 BLADDER OUTLET OBSTRUCTION: Primary | ICD-10-CM

## 2021-01-01 LAB
ABO/RH(D): NORMAL
ALBUMIN SERPL-MCNC: 2.2 G/DL (ref 3.4–5)
ALBUMIN SERPL-MCNC: 3.3 G/DL (ref 3.4–5)
ALBUMIN SERPL-MCNC: 3.7 G/DL (ref 3.4–5)
ALBUMIN UR-MCNC: 10 MG/DL
ALBUMIN UR-MCNC: 100 MG/DL
ALBUMIN UR-MCNC: NEGATIVE MG/DL
ALP SERPL-CCNC: 107 U/L (ref 40–150)
ALP SERPL-CCNC: 119 U/L (ref 40–150)
ALP SERPL-CCNC: 70 U/L (ref 40–150)
ALT SERPL W P-5'-P-CCNC: 151 U/L (ref 0–70)
ALT SERPL W P-5'-P-CCNC: 20 U/L (ref 0–70)
ALT SERPL W P-5'-P-CCNC: 26 U/L (ref 0–70)
AMORPH CRY #/AREA URNS HPF: ABNORMAL /HPF
ANION GAP SERPL CALCULATED.3IONS-SCNC: 1 MMOL/L (ref 3–14)
ANION GAP SERPL CALCULATED.3IONS-SCNC: 3 MMOL/L (ref 3–14)
ANION GAP SERPL CALCULATED.3IONS-SCNC: 4 MMOL/L (ref 3–14)
ANION GAP SERPL CALCULATED.3IONS-SCNC: 4 MMOL/L (ref 3–14)
ANION GAP SERPL CALCULATED.3IONS-SCNC: 5 MMOL/L (ref 3–14)
ANION GAP SERPL CALCULATED.3IONS-SCNC: 8 MMOL/L (ref 3–14)
ANION GAP SERPL CALCULATED.3IONS-SCNC: <1 MMOL/L (ref 3–14)
ANTIBODY SCREEN: NEGATIVE
APPEARANCE UR: ABNORMAL
APPEARANCE UR: CLEAR
APPEARANCE UR: CLEAR
AST SERPL W P-5'-P-CCNC: 12 U/L (ref 0–45)
AST SERPL W P-5'-P-CCNC: 165 U/L (ref 0–45)
AST SERPL W P-5'-P-CCNC: 20 U/L (ref 0–45)
BACTERIA SPEC CULT: NO GROWTH
BASE EXCESS BLDA CALC-SCNC: 6.5 MMOL/L
BASE EXCESS BLDV CALC-SCNC: 10.5 MMOL/L (ref -7.7–1.9)
BASE EXCESS BLDV CALC-SCNC: 14.6 MMOL/L (ref -7.7–1.9)
BASE EXCESS BLDV CALC-SCNC: 16.6 MMOL/L (ref -7.7–1.9)
BASE EXCESS BLDV CALC-SCNC: 4.5 MMOL/L
BASE EXCESS BLDV CALC-SCNC: 5.3 MMOL/L
BASE EXCESS BLDV CALC-SCNC: 5.3 MMOL/L
BASE EXCESS BLDV CALC-SCNC: 5.9 MMOL/L
BASE EXCESS BLDV CALC-SCNC: 6.3 MMOL/L
BASE EXCESS BLDV CALC-SCNC: 7 MMOL/L
BASE EXCESS BLDV CALC-SCNC: 7 MMOL/L (ref -7.7–1.9)
BASE EXCESS BLDV CALC-SCNC: 7.4 MMOL/L
BASE EXCESS BLDV CALC-SCNC: 7.5 MMOL/L (ref -7.7–1.9)
BASE EXCESS BLDV CALC-SCNC: 8.8 MMOL/L
BASE EXCESS BLDV CALC-SCNC: 8.8 MMOL/L (ref -7.7–1.9)
BASOPHILS # BLD AUTO: 0 10E3/UL (ref 0–0.2)
BASOPHILS # BLD AUTO: 0 10E9/L (ref 0–0.2)
BASOPHILS NFR BLD AUTO: 0 %
BASOPHILS NFR BLD AUTO: 0.1 %
BASOPHILS NFR BLD AUTO: 0.1 %
BASOPHILS NFR BLD AUTO: 0.2 %
BASOPHILS NFR BLD AUTO: 0.2 %
BASOPHILS NFR BLD AUTO: 0.4 %
BILIRUB SERPL-MCNC: 0.2 MG/DL (ref 0.2–1.3)
BILIRUB SERPL-MCNC: 0.5 MG/DL (ref 0.2–1.3)
BILIRUB SERPL-MCNC: 0.6 MG/DL (ref 0.2–1.3)
BILIRUB UR QL STRIP: NEGATIVE
BLD PROD TYP BPU: NORMAL
BLOOD COMPONENT TYPE: NORMAL
BUN SERPL-MCNC: 10 MG/DL (ref 7–30)
BUN SERPL-MCNC: 12 MG/DL (ref 7–30)
BUN SERPL-MCNC: 127 MG/DL (ref 7–30)
BUN SERPL-MCNC: 13 MG/DL (ref 7–30)
BUN SERPL-MCNC: 15 MG/DL (ref 7–30)
BUN SERPL-MCNC: 16 MG/DL (ref 7–30)
BUN SERPL-MCNC: 16 MG/DL (ref 7–30)
BUN SERPL-MCNC: 17 MG/DL (ref 7–30)
BUN SERPL-MCNC: 18 MG/DL (ref 7–30)
BUN SERPL-MCNC: 19 MG/DL (ref 7–30)
BUN SERPL-MCNC: 19 MG/DL (ref 7–30)
BUN SERPL-MCNC: 22 MG/DL (ref 7–30)
BUN SERPL-MCNC: 23 MG/DL (ref 7–30)
BUN SERPL-MCNC: 25 MG/DL (ref 7–30)
BUN SERPL-MCNC: 35 MG/DL (ref 7–30)
CALCIUM SERPL-MCNC: 6.7 MG/DL (ref 8.5–10.1)
CALCIUM SERPL-MCNC: 8.1 MG/DL (ref 8.5–10.1)
CALCIUM SERPL-MCNC: 8.3 MG/DL (ref 8.5–10.1)
CALCIUM SERPL-MCNC: 8.3 MG/DL (ref 8.5–10.1)
CALCIUM SERPL-MCNC: 8.4 MG/DL (ref 8.5–10.1)
CALCIUM SERPL-MCNC: 8.6 MG/DL (ref 8.5–10.1)
CALCIUM SERPL-MCNC: 8.6 MG/DL (ref 8.5–10.1)
CALCIUM SERPL-MCNC: 8.8 MG/DL (ref 8.5–10.1)
CALCIUM SERPL-MCNC: 9 MG/DL (ref 8.5–10.1)
CALCIUM SERPL-MCNC: 9.1 MG/DL (ref 8.5–10.1)
CALCIUM SERPL-MCNC: 9.1 MG/DL (ref 8.5–10.1)
CALCIUM SERPL-MCNC: 9.2 MG/DL (ref 8.5–10.1)
CALCIUM SERPL-MCNC: 9.4 MG/DL (ref 8.5–10.1)
CALCIUM SERPL-MCNC: 9.5 MG/DL (ref 8.5–10.1)
CAOX CRY #/AREA URNS HPF: ABNORMAL /HPF
CHLORIDE BLD-SCNC: 100 MMOL/L (ref 94–109)
CHLORIDE BLD-SCNC: 112 MMOL/L (ref 94–109)
CHLORIDE BLD-SCNC: 115 MMOL/L (ref 94–109)
CHLORIDE BLD-SCNC: 79 MMOL/L (ref 94–109)
CHLORIDE BLD-SCNC: 80 MMOL/L (ref 94–109)
CHLORIDE BLD-SCNC: 86 MMOL/L (ref 94–109)
CHLORIDE BLD-SCNC: 90 MMOL/L (ref 94–109)
CHLORIDE BLD-SCNC: 92 MMOL/L (ref 94–109)
CHLORIDE BLD-SCNC: 95 MMOL/L (ref 94–109)
CHLORIDE BLD-SCNC: 96 MMOL/L (ref 94–109)
CHLORIDE SERPL-SCNC: 88 MMOL/L (ref 94–109)
CHLORIDE SERPL-SCNC: 91 MMOL/L (ref 94–109)
CHLORIDE SERPL-SCNC: 92 MMOL/L (ref 94–109)
CHLORIDE SERPL-SCNC: 94 MMOL/L (ref 94–109)
CHLORIDE SERPL-SCNC: 95 MMOL/L (ref 94–109)
CHLORIDE SERPL-SCNC: 96 MMOL/L (ref 94–109)
CHLORIDE SERPL-SCNC: 97 MMOL/L (ref 94–109)
CHLORIDE SERPL-SCNC: 97 MMOL/L (ref 94–109)
CHLORIDE SERPL-SCNC: 98 MMOL/L (ref 94–109)
CO2 SERPL-SCNC: 30 MMOL/L (ref 20–32)
CO2 SERPL-SCNC: 31 MMOL/L (ref 20–32)
CO2 SERPL-SCNC: 31 MMOL/L (ref 20–32)
CO2 SERPL-SCNC: 32 MMOL/L (ref 20–32)
CO2 SERPL-SCNC: 33 MMOL/L (ref 20–32)
CO2 SERPL-SCNC: 34 MMOL/L (ref 20–32)
CO2 SERPL-SCNC: 35 MMOL/L (ref 20–32)
CO2 SERPL-SCNC: 36 MMOL/L (ref 20–32)
CO2 SERPL-SCNC: 36 MMOL/L (ref 20–32)
CO2 SERPL-SCNC: 39 MMOL/L (ref 20–32)
CO2 SERPL-SCNC: 40 MMOL/L (ref 20–32)
CO2 SERPL-SCNC: 41 MMOL/L (ref 20–32)
CO2 SERPL-SCNC: 41 MMOL/L (ref 20–32)
CO2 SERPL-SCNC: 42 MMOL/L (ref 20–32)
CO2 SERPL-SCNC: 44 MMOL/L (ref 20–32)
CO2 SERPL-SCNC: >45 MMOL/L (ref 20–32)
CO2 SERPL-SCNC: >45 MMOL/L (ref 20–32)
CODING SYSTEM: NORMAL
COHGB MFR BLD: 2.6 % (ref 0–2)
COLOR UR AUTO: YELLOW
CREAT SERPL-MCNC: 0.33 MG/DL (ref 0.66–1.25)
CREAT SERPL-MCNC: 0.34 MG/DL (ref 0.66–1.25)
CREAT SERPL-MCNC: 0.35 MG/DL (ref 0.66–1.25)
CREAT SERPL-MCNC: 0.36 MG/DL (ref 0.66–1.25)
CREAT SERPL-MCNC: 0.37 MG/DL (ref 0.66–1.25)
CREAT SERPL-MCNC: 0.38 MG/DL (ref 0.66–1.25)
CREAT SERPL-MCNC: 0.39 MG/DL (ref 0.66–1.25)
CREAT SERPL-MCNC: 0.4 MG/DL (ref 0.66–1.25)
CREAT SERPL-MCNC: 0.41 MG/DL (ref 0.66–1.25)
CREAT SERPL-MCNC: 0.41 MG/DL (ref 0.66–1.25)
CREAT SERPL-MCNC: 0.42 MG/DL (ref 0.66–1.25)
CREAT SERPL-MCNC: 0.42 MG/DL (ref 0.66–1.25)
CREAT SERPL-MCNC: 0.43 MG/DL (ref 0.66–1.25)
CREAT SERPL-MCNC: 0.44 MG/DL (ref 0.66–1.25)
CREAT SERPL-MCNC: 0.44 MG/DL (ref 0.66–1.25)
CREAT SERPL-MCNC: 0.47 MG/DL (ref 0.66–1.25)
CREAT SERPL-MCNC: 0.73 MG/DL (ref 0.66–1.25)
CREAT SERPL-MCNC: 1.63 MG/DL (ref 0.66–1.25)
CROSSMATCH: NORMAL
CRP SERPL-MCNC: 13.2 MG/L (ref 0–8)
D DIMER PPP FEU-MCNC: 0.3 UG/ML FEU (ref 0–0.5)
D DIMER PPP FEU-MCNC: 3.2 UG/ML FEU (ref 0–0.5)
DIFFERENTIAL METHOD BLD: ABNORMAL
DLCOCOR-%PRED-PRE: 22 %
DLCOCOR-PRE: 5.68 ML/MIN/MMHG
DLCOUNC-%PRED-PRE: 20 %
DLCOUNC-PRE: 5.2 ML/MIN/MMHG
DLCOUNC-PRED: 24.99 ML/MIN/MMHG
EOSINOPHIL # BLD AUTO: 0 10E3/UL (ref 0–0.7)
EOSINOPHIL # BLD AUTO: 0 10E3/UL (ref 0–0.7)
EOSINOPHIL # BLD AUTO: 0 10E9/L (ref 0–0.7)
EOSINOPHIL # BLD AUTO: 0.1 10E3/UL (ref 0–0.7)
EOSINOPHIL # BLD AUTO: 0.2 10E9/L (ref 0–0.7)
EOSINOPHIL # BLD AUTO: 0.2 10E9/L (ref 0–0.7)
EOSINOPHIL NFR BLD AUTO: 0 %
EOSINOPHIL NFR BLD AUTO: 1 %
EOSINOPHIL NFR BLD AUTO: 2 %
EOSINOPHIL NFR BLD AUTO: 3 %
ERV-%PRED-PRE: 94 %
ERV-PRE: 1.31 L
ERV-PRED: 1.39 L
ERYTHROCYTE [DISTWIDTH] IN BLOOD BY AUTOMATED COUNT: 12.1 % (ref 10–15)
ERYTHROCYTE [DISTWIDTH] IN BLOOD BY AUTOMATED COUNT: 12.2 % (ref 10–15)
ERYTHROCYTE [DISTWIDTH] IN BLOOD BY AUTOMATED COUNT: 12.2 % (ref 10–15)
ERYTHROCYTE [DISTWIDTH] IN BLOOD BY AUTOMATED COUNT: 12.5 % (ref 10–15)
ERYTHROCYTE [DISTWIDTH] IN BLOOD BY AUTOMATED COUNT: 12.7 % (ref 10–15)
ERYTHROCYTE [DISTWIDTH] IN BLOOD BY AUTOMATED COUNT: 12.7 % (ref 10–15)
ERYTHROCYTE [DISTWIDTH] IN BLOOD BY AUTOMATED COUNT: 13.2 % (ref 10–15)
ERYTHROCYTE [DISTWIDTH] IN BLOOD BY AUTOMATED COUNT: 14.1 % (ref 10–15)
ERYTHROCYTE [DISTWIDTH] IN BLOOD BY AUTOMATED COUNT: 14.2 % (ref 10–15)
ERYTHROCYTE [DISTWIDTH] IN BLOOD BY AUTOMATED COUNT: 14.8 % (ref 10–15)
ERYTHROCYTE [DISTWIDTH] IN BLOOD BY AUTOMATED COUNT: 15.1 % (ref 10–15)
ERYTHROCYTE [DISTWIDTH] IN BLOOD BY AUTOMATED COUNT: 15.3 % (ref 10–15)
ERYTHROCYTE [DISTWIDTH] IN BLOOD BY AUTOMATED COUNT: 15.5 % (ref 10–15)
EXPTIME-PRE: 8.88 SEC
FEF2575-%PRED-POST: 8 %
FEF2575-%PRED-PRE: 9 %
FEF2575-POST: 0.23 L/SEC
FEF2575-PRE: 0.26 L/SEC
FEF2575-PRED: 2.72 L/SEC
FEFMAX-%PRED-PRE: 23 %
FEFMAX-PRE: 1.96 L/SEC
FEFMAX-PRED: 8.49 L/SEC
FEV1-%PRED-PRE: 19 %
FEV1-PRE: 0.62 L
FEV1FEV6-PRE: 34 %
FEV1FEV6-PRED: 79 %
FEV1FVC-PRE: 28 %
FEV1FVC-PRED: 78 %
FEV1SVC-PRE: 22 %
FEV1SVC-PRED: 73 %
FIFMAX-PRE: 2.24 L/SEC
FLUABV+SARS-COV-2+RSV PNL RESP NAA+PROBE: NORMAL
FLUABV+SARS-COV-2+RSV PNL RESP NAA+PROBE: NORMAL
FLUAV RNA RESP QL NAA+PROBE: NEGATIVE
FLUAV RNA SPEC QL NAA+PROBE: NEGATIVE
FLUAV+FLUBV AG SPEC QL: NEGATIVE
FLUAV+FLUBV AG SPEC QL: NEGATIVE
FLUBV RNA RESP QL NAA+PROBE: NEGATIVE
FLUBV RNA RESP QL NAA+PROBE: NEGATIVE
FVC-%PRED-PRE: 52 %
FVC-PRE: 2.19 L
FVC-PRED: 4.14 L
GFR SERPL CREATININE-BSD FRML MDRD: 44 ML/MIN/1.73M2
GFR SERPL CREATININE-BSD FRML MDRD: >90 ML/MIN/1.73M2
GFR SERPL CREATININE-BSD FRML MDRD: >90 ML/MIN/{1.73_M2}
GLUCOSE BLD-MCNC: 103 MG/DL (ref 70–99)
GLUCOSE BLD-MCNC: 118 MG/DL (ref 70–99)
GLUCOSE BLD-MCNC: 122 MG/DL (ref 70–99)
GLUCOSE BLD-MCNC: 129 MG/DL (ref 70–99)
GLUCOSE BLD-MCNC: 132 MG/DL (ref 70–99)
GLUCOSE BLD-MCNC: 139 MG/DL (ref 70–99)
GLUCOSE BLD-MCNC: 142 MG/DL (ref 70–99)
GLUCOSE BLD-MCNC: 145 MG/DL (ref 70–99)
GLUCOSE BLD-MCNC: 148 MG/DL (ref 70–99)
GLUCOSE BLD-MCNC: 152 MG/DL (ref 70–99)
GLUCOSE BLDC GLUCOMTR-MCNC: 100 MG/DL (ref 70–99)
GLUCOSE BLDC GLUCOMTR-MCNC: 108 MG/DL (ref 70–99)
GLUCOSE BLDC GLUCOMTR-MCNC: 115 MG/DL (ref 70–99)
GLUCOSE BLDC GLUCOMTR-MCNC: 115 MG/DL (ref 70–99)
GLUCOSE BLDC GLUCOMTR-MCNC: 116 MG/DL (ref 70–99)
GLUCOSE BLDC GLUCOMTR-MCNC: 125 MG/DL (ref 70–99)
GLUCOSE BLDC GLUCOMTR-MCNC: 129 MG/DL (ref 70–99)
GLUCOSE BLDC GLUCOMTR-MCNC: 134 MG/DL (ref 70–99)
GLUCOSE BLDC GLUCOMTR-MCNC: 136 MG/DL (ref 70–99)
GLUCOSE BLDC GLUCOMTR-MCNC: 144 MG/DL (ref 70–99)
GLUCOSE BLDC GLUCOMTR-MCNC: 145 MG/DL (ref 70–99)
GLUCOSE BLDC GLUCOMTR-MCNC: 149 MG/DL (ref 70–99)
GLUCOSE SERPL-MCNC: 118 MG/DL (ref 70–99)
GLUCOSE SERPL-MCNC: 121 MG/DL (ref 70–99)
GLUCOSE SERPL-MCNC: 135 MG/DL (ref 70–99)
GLUCOSE SERPL-MCNC: 149 MG/DL (ref 70–99)
GLUCOSE SERPL-MCNC: 157 MG/DL (ref 70–99)
GLUCOSE SERPL-MCNC: 81 MG/DL (ref 70–99)
GLUCOSE SERPL-MCNC: 89 MG/DL (ref 70–99)
GLUCOSE SERPL-MCNC: 96 MG/DL (ref 70–99)
GLUCOSE SERPL-MCNC: 99 MG/DL (ref 70–99)
GLUCOSE UR STRIP-MCNC: NEGATIVE MG/DL
HBA1C MFR BLD: 5.4 % (ref 0–5.6)
HCO3 BLD-SCNC: 34 MMOL/L (ref 21–28)
HCO3 BLDV-SCNC: 33 MMOL/L (ref 21–28)
HCO3 BLDV-SCNC: 34 MMOL/L (ref 21–28)
HCO3 BLDV-SCNC: 36 MMOL/L (ref 21–28)
HCO3 BLDV-SCNC: 37 MMOL/L (ref 21–28)
HCO3 BLDV-SCNC: 38 MMOL/L (ref 21–28)
HCO3 BLDV-SCNC: 38 MMOL/L (ref 21–28)
HCO3 BLDV-SCNC: 39 MMOL/L (ref 21–28)
HCO3 BLDV-SCNC: 45 MMOL/L (ref 21–28)
HCO3 BLDV-SCNC: 46 MMOL/L (ref 21–28)
HCT VFR BLD AUTO: 22.4 % (ref 40–53)
HCT VFR BLD AUTO: 23 % (ref 40–53)
HCT VFR BLD AUTO: 24.6 % (ref 40–53)
HCT VFR BLD AUTO: 25.5 % (ref 40–53)
HCT VFR BLD AUTO: 26 % (ref 40–53)
HCT VFR BLD AUTO: 26.2 % (ref 40–53)
HCT VFR BLD AUTO: 29.7 % (ref 40–53)
HCT VFR BLD AUTO: 31.8 % (ref 40–53)
HCT VFR BLD AUTO: 32.8 % (ref 40–53)
HCT VFR BLD AUTO: 33.4 % (ref 40–53)
HCT VFR BLD AUTO: 33.6 % (ref 40–53)
HCT VFR BLD AUTO: 34.6 % (ref 40–53)
HCT VFR BLD AUTO: 35.5 % (ref 40–53)
HCT VFR BLD AUTO: 36 % (ref 40–53)
HCT VFR BLD AUTO: 39.2 % (ref 40–53)
HGB BLD-MCNC: 10 G/DL (ref 13.3–17.7)
HGB BLD-MCNC: 10.3 G/DL (ref 13.3–17.7)
HGB BLD-MCNC: 10.5 G/DL (ref 13.3–17.7)
HGB BLD-MCNC: 10.8 G/DL (ref 13.3–17.7)
HGB BLD-MCNC: 11.1 G/DL (ref 13.3–17.7)
HGB BLD-MCNC: 11.3 G/DL (ref 13.3–17.7)
HGB BLD-MCNC: 11.7 G/DL (ref 13.3–17.7)
HGB BLD-MCNC: 11.9 G/DL (ref 13.3–17.7)
HGB BLD-MCNC: 13.4 G/DL (ref 13.3–17.7)
HGB BLD-MCNC: 6.1 G/DL (ref 13.3–17.7)
HGB BLD-MCNC: 6.5 G/DL (ref 13.3–17.7)
HGB BLD-MCNC: 7.6 G/DL (ref 13.3–17.7)
HGB BLD-MCNC: 7.6 G/DL (ref 13.3–17.7)
HGB BLD-MCNC: 7.7 G/DL (ref 13.3–17.7)
HGB BLD-MCNC: 7.7 G/DL (ref 13.3–17.7)
HGB BLD-MCNC: 7.9 G/DL (ref 13.3–17.7)
HGB BLD-MCNC: 7.9 G/DL (ref 13.3–17.7)
HGB BLD-MCNC: 8 G/DL (ref 13.3–17.7)
HGB BLD-MCNC: 8.1 G/DL (ref 13.3–17.7)
HGB UR QL STRIP: ABNORMAL
HGB UR QL STRIP: NEGATIVE
HGB UR QL STRIP: NEGATIVE
HOLD SPECIMEN: NORMAL
IC-%PRED-PRE: 48 %
IC-PRE: 1.5 L
IC-PRED: 3.06 L
IMM GRANULOCYTES # BLD: 0 10E3/UL
IMM GRANULOCYTES # BLD: 0 10E9/L (ref 0–0.4)
IMM GRANULOCYTES # BLD: 0.1 10E3/UL
IMM GRANULOCYTES # BLD: 0.1 10E9/L (ref 0–0.4)
IMM GRANULOCYTES # BLD: 0.2 10E9/L (ref 0–0.4)
IMM GRANULOCYTES # BLD: 0.3 10E3/UL
IMM GRANULOCYTES NFR BLD: 0 %
IMM GRANULOCYTES NFR BLD: 0.3 %
IMM GRANULOCYTES NFR BLD: 0.4 %
IMM GRANULOCYTES NFR BLD: 0.5 %
IMM GRANULOCYTES NFR BLD: 1 %
IMM GRANULOCYTES NFR BLD: 1 %
IMM GRANULOCYTES NFR BLD: 1.2 %
IMM GRANULOCYTES NFR BLD: 2 %
INTERPRETATION ECG - MUSE: NORMAL
ISSUE DATE AND TIME: NORMAL
KETONES UR STRIP-MCNC: NEGATIVE MG/DL
LABORATORY COMMENT REPORT: NORMAL
LACTATE BLD-SCNC: 0.6 MMOL/L (ref 0.7–2)
LACTATE BLD-SCNC: 0.8 MMOL/L (ref 0.7–2)
LACTATE BLD-SCNC: 1.1 MMOL/L (ref 0.7–2)
LACTATE BLD-SCNC: 1.7 MMOL/L (ref 0.7–2)
LACTATE SERPL-SCNC: 0.5 MMOL/L (ref 0.7–2)
LACTATE SERPL-SCNC: 0.6 MMOL/L (ref 0.7–2)
LACTATE SERPL-SCNC: 0.6 MMOL/L (ref 0.7–2)
LACTATE SERPL-SCNC: 0.7 MMOL/L (ref 0.7–2)
LACTATE SERPL-SCNC: 0.7 MMOL/L (ref 0.7–2)
LACTATE SERPL-SCNC: 1.5 MMOL/L (ref 0.7–2)
LACTATE SERPL-SCNC: 2.1 MMOL/L (ref 0.7–2)
LEUKOCYTE ESTERASE UR QL STRIP: ABNORMAL
LEUKOCYTE ESTERASE UR QL STRIP: NEGATIVE
LEUKOCYTE ESTERASE UR QL STRIP: NEGATIVE
LYMPHOCYTES # BLD AUTO: 0.3 10E3/UL (ref 0.8–5.3)
LYMPHOCYTES # BLD AUTO: 0.4 10E9/L (ref 0.8–5.3)
LYMPHOCYTES # BLD AUTO: 0.6 10E9/L (ref 0.8–5.3)
LYMPHOCYTES # BLD AUTO: 0.8 10E9/L (ref 0.8–5.3)
LYMPHOCYTES # BLD AUTO: 0.9 10E3/UL (ref 0.8–5.3)
LYMPHOCYTES # BLD AUTO: 1.1 10E9/L (ref 0.8–5.3)
LYMPHOCYTES # BLD AUTO: 1.7 10E3/UL (ref 0.8–5.3)
LYMPHOCYTES # BLD AUTO: 1.9 10E9/L (ref 0.8–5.3)
LYMPHOCYTES NFR BLD AUTO: 12 %
LYMPHOCYTES NFR BLD AUTO: 14 %
LYMPHOCYTES NFR BLD AUTO: 15.9 %
LYMPHOCYTES NFR BLD AUTO: 17 %
LYMPHOCYTES NFR BLD AUTO: 2 %
LYMPHOCYTES NFR BLD AUTO: 2.3 %
LYMPHOCYTES NFR BLD AUTO: 2.9 %
LYMPHOCYTES NFR BLD AUTO: 5.3 %
MAGNESIUM SERPL-MCNC: 1.8 MG/DL (ref 1.6–2.3)
MAGNESIUM SERPL-MCNC: 2 MG/DL (ref 1.6–2.3)
MAGNESIUM SERPL-MCNC: 2.1 MG/DL (ref 1.6–2.3)
MAGNESIUM SERPL-MCNC: 2.1 MG/DL (ref 1.6–2.3)
MAGNESIUM SERPL-MCNC: 2.2 MG/DL (ref 1.6–2.3)
MCH RBC QN AUTO: 29.1 PG (ref 26.5–33)
MCH RBC QN AUTO: 29.8 PG (ref 26.5–33)
MCH RBC QN AUTO: 29.9 PG (ref 26.5–33)
MCH RBC QN AUTO: 30 PG (ref 26.5–33)
MCH RBC QN AUTO: 30.2 PG (ref 26.5–33)
MCH RBC QN AUTO: 30.3 PG (ref 26.5–33)
MCH RBC QN AUTO: 30.6 PG (ref 26.5–33)
MCH RBC QN AUTO: 30.7 PG (ref 26.5–33)
MCH RBC QN AUTO: 30.9 PG (ref 26.5–33)
MCH RBC QN AUTO: 31.1 PG (ref 26.5–33)
MCH RBC QN AUTO: 31.2 PG (ref 26.5–33)
MCH RBC QN AUTO: 31.2 PG (ref 26.5–33)
MCH RBC QN AUTO: 31.3 PG (ref 26.5–33)
MCH RBC QN AUTO: 31.7 PG (ref 26.5–33)
MCH RBC QN AUTO: 32.3 PG (ref 26.5–33)
MCHC RBC AUTO-ENTMCNC: 29 G/DL (ref 31.5–36.5)
MCHC RBC AUTO-ENTMCNC: 30.2 G/DL (ref 31.5–36.5)
MCHC RBC AUTO-ENTMCNC: 30.2 G/DL (ref 31.5–36.5)
MCHC RBC AUTO-ENTMCNC: 31.2 G/DL (ref 31.5–36.5)
MCHC RBC AUTO-ENTMCNC: 31.3 G/DL (ref 31.5–36.5)
MCHC RBC AUTO-ENTMCNC: 31.4 G/DL (ref 31.5–36.5)
MCHC RBC AUTO-ENTMCNC: 32.3 G/DL (ref 31.5–36.5)
MCHC RBC AUTO-ENTMCNC: 32.7 G/DL (ref 31.5–36.5)
MCHC RBC AUTO-ENTMCNC: 33 G/DL (ref 31.5–36.5)
MCHC RBC AUTO-ENTMCNC: 33.1 G/DL (ref 31.5–36.5)
MCHC RBC AUTO-ENTMCNC: 33.7 G/DL (ref 31.5–36.5)
MCHC RBC AUTO-ENTMCNC: 34.2 G/DL (ref 31.5–36.5)
MCV RBC AUTO: 100 FL (ref 78–100)
MCV RBC AUTO: 100 FL (ref 78–100)
MCV RBC AUTO: 92 FL (ref 78–100)
MCV RBC AUTO: 92 FL (ref 78–100)
MCV RBC AUTO: 93 FL (ref 78–100)
MCV RBC AUTO: 94 FL (ref 78–100)
MCV RBC AUTO: 94 FL (ref 78–100)
MCV RBC AUTO: 95 FL (ref 78–100)
MCV RBC AUTO: 96 FL (ref 78–100)
MCV RBC AUTO: 96 FL (ref 78–100)
MCV RBC AUTO: 98 FL (ref 78–100)
MCV RBC AUTO: 98 FL (ref 78–100)
MCV RBC AUTO: 99 FL (ref 78–100)
MONOCYTES # BLD AUTO: 0.6 10E3/UL (ref 0–1.3)
MONOCYTES # BLD AUTO: 0.7 10E9/L (ref 0–1.3)
MONOCYTES # BLD AUTO: 0.7 10E9/L (ref 0–1.3)
MONOCYTES # BLD AUTO: 1 10E3/UL (ref 0–1.3)
MONOCYTES # BLD AUTO: 1.2 10E3/UL (ref 0–1.3)
MONOCYTES # BLD AUTO: 1.2 10E9/L (ref 0–1.3)
MONOCYTES # BLD AUTO: 1.4 10E9/L (ref 0–1.3)
MONOCYTES # BLD AUTO: 1.4 10E9/L (ref 0–1.3)
MONOCYTES NFR BLD AUTO: 10 %
MONOCYTES NFR BLD AUTO: 12.4 %
MONOCYTES NFR BLD AUTO: 3.8 %
MONOCYTES NFR BLD AUTO: 5.9 %
MONOCYTES NFR BLD AUTO: 7 %
MONOCYTES NFR BLD AUTO: 8 %
MONOCYTES NFR BLD AUTO: 9.3 %
MONOCYTES NFR BLD AUTO: 9.8 %
MUCOUS THREADS #/AREA URNS LPF: PRESENT /LPF
NEUTROPHILS # BLD AUTO: 12.6 10E9/L (ref 1.6–8.3)
NEUTROPHILS # BLD AUTO: 13.2 10E3/UL (ref 1.6–8.3)
NEUTROPHILS # BLD AUTO: 16.4 10E9/L (ref 1.6–8.3)
NEUTROPHILS # BLD AUTO: 18.1 10E9/L (ref 1.6–8.3)
NEUTROPHILS # BLD AUTO: 5 10E9/L (ref 1.6–8.3)
NEUTROPHILS # BLD AUTO: 6 10E3/UL (ref 1.6–8.3)
NEUTROPHILS # BLD AUTO: 7.4 10E9/L (ref 1.6–8.3)
NEUTROPHILS # BLD AUTO: 9.1 10E3/UL (ref 1.6–8.3)
NEUTROPHILS NFR BLD AUTO: 67.4 %
NEUTROPHILS NFR BLD AUTO: 70.5 %
NEUTROPHILS NFR BLD AUTO: 75 %
NEUTROPHILS NFR BLD AUTO: 79 %
NEUTROPHILS NFR BLD AUTO: 84.8 %
NEUTROPHILS NFR BLD AUTO: 89 %
NEUTROPHILS NFR BLD AUTO: 90.7 %
NEUTROPHILS NFR BLD AUTO: 92.6 %
NITRATE UR QL: NEGATIVE
NRBC # BLD AUTO: 0 10*3/UL
NRBC # BLD AUTO: 0 10E3/UL
NRBC # BLD AUTO: 0 10E3/UL
NRBC # BLD AUTO: 0.1 10*3/UL
NRBC # BLD AUTO: 0.1 10E3/UL
NRBC BLD AUTO-RTO: 0 /100
NRBC BLD AUTO-RTO: 1 /100
NT-PROBNP SERPL-MCNC: 83 PG/ML (ref 0–900)
O2/TOTAL GAS SETTING VFR VENT: 3 %
O2/TOTAL GAS SETTING VFR VENT: 30 %
O2/TOTAL GAS SETTING VFR VENT: 35 %
O2/TOTAL GAS SETTING VFR VENT: 40 %
O2/TOTAL GAS SETTING VFR VENT: 40 %
O2/TOTAL GAS SETTING VFR VENT: 50 %
O2/TOTAL GAS SETTING VFR VENT: 70 %
O2/TOTAL GAS SETTING VFR VENT: 70 %
O2/TOTAL GAS SETTING VFR VENT: ABNORMAL %
OSMOLALITY SERPL: 264 MMOL/KG (ref 280–301)
OSMOLALITY UR: 535 MMOL/KG (ref 100–1200)
OSMOLALITY UR: 694 MMOL/KG (ref 100–1200)
OXYHGB MFR BLD: 93 % (ref 92–100)
PCO2 BLD: 64 MM HG (ref 35–45)
PCO2 BLDV: 107 MM HG (ref 40–50)
PCO2 BLDV: 55 MM HG (ref 40–50)
PCO2 BLDV: 62 MM HG (ref 40–50)
PCO2 BLDV: 70 MM HG (ref 40–50)
PCO2 BLDV: 72 MM HG (ref 40–50)
PCO2 BLDV: 75 MM HG (ref 40–50)
PCO2 BLDV: 76 MM HG (ref 40–50)
PCO2 BLDV: 78 MM HG (ref 40–50)
PCO2 BLDV: 81 MM HG (ref 40–50)
PCO2 BLDV: 82 MM HG (ref 40–50)
PCO2 BLDV: 83 MM HG (ref 40–50)
PCO2 BLDV: 86 MM HG (ref 40–50)
PCO2 BLDV: 89 MM HG (ref 40–50)
PCO2 BLDV: 91 MM HG (ref 40–50)
PH BLD: 7.34 PH (ref 7.35–7.45)
PH BLDV: 7.21 [PH] (ref 7.32–7.43)
PH BLDV: 7.23 [PH] (ref 7.32–7.43)
PH BLDV: 7.24 [PH] (ref 7.32–7.43)
PH BLDV: 7.25 PH (ref 7.32–7.43)
PH BLDV: 7.25 PH (ref 7.32–7.43)
PH BLDV: 7.26 PH (ref 7.32–7.43)
PH BLDV: 7.26 [PH] (ref 7.32–7.43)
PH BLDV: 7.29 PH (ref 7.32–7.43)
PH BLDV: 7.3 PH (ref 7.32–7.43)
PH BLDV: 7.3 PH (ref 7.32–7.43)
PH BLDV: 7.31 [PH] (ref 7.32–7.43)
PH BLDV: 7.36 [PH] (ref 7.32–7.43)
PH BLDV: 7.37 PH (ref 7.32–7.43)
PH BLDV: 7.39 PH (ref 7.32–7.43)
PH UR STRIP: 5 PH (ref 5–7)
PH UR STRIP: 6 PH (ref 5–7)
PH UR STRIP: 7 [PH] (ref 5–7)
PHOSPHATE SERPL-MCNC: 2.6 MG/DL (ref 2.5–4.5)
PHOSPHATE SERPL-MCNC: 3 MG/DL (ref 2.5–4.5)
PLATELET # BLD AUTO: 207 10E3/UL (ref 150–450)
PLATELET # BLD AUTO: 217 10E9/L (ref 150–450)
PLATELET # BLD AUTO: 223 10E3/UL (ref 150–450)
PLATELET # BLD AUTO: 230 10E3/UL (ref 150–450)
PLATELET # BLD AUTO: 241 10E9/L (ref 150–450)
PLATELET # BLD AUTO: 250 10E9/L (ref 150–450)
PLATELET # BLD AUTO: 253 10E3/UL (ref 150–450)
PLATELET # BLD AUTO: 254 10E3/UL (ref 150–450)
PLATELET # BLD AUTO: 254 10E3/UL (ref 150–450)
PLATELET # BLD AUTO: 257 10E9/L (ref 150–450)
PLATELET # BLD AUTO: 258 10E3/UL (ref 150–450)
PLATELET # BLD AUTO: 266 10E9/L (ref 150–450)
PLATELET # BLD AUTO: 268 10E9/L (ref 150–450)
PLATELET # BLD AUTO: 279 10E9/L (ref 150–450)
PLATELET # BLD AUTO: 347 10E3/UL (ref 150–450)
PLATELET # BLD AUTO: 403 10E3/UL (ref 150–450)
PO2 BLD: 79 MM HG (ref 80–105)
PO2 BLDV: 23 MM HG (ref 25–47)
PO2 BLDV: 31 MM HG (ref 25–47)
PO2 BLDV: 35 MM HG (ref 25–47)
PO2 BLDV: 39 MM HG (ref 25–47)
PO2 BLDV: 47 MM HG (ref 25–47)
PO2 BLDV: 50 MM HG (ref 25–47)
PO2 BLDV: 51 MM HG (ref 25–47)
PO2 BLDV: 54 MM HG (ref 25–47)
PO2 BLDV: 56 MM HG (ref 25–47)
PO2 BLDV: 57 MM HG (ref 25–47)
PO2 BLDV: 61 MM HG (ref 25–47)
PO2 BLDV: 64 MM HG (ref 25–47)
PO2 BLDV: 77 MM HG (ref 25–47)
PO2 BLDV: 78 MM HG (ref 25–47)
POTASSIUM BLD-SCNC: 4.4 MMOL/L (ref 3.4–5.3)
POTASSIUM BLD-SCNC: 4.5 MMOL/L (ref 3.4–5.3)
POTASSIUM BLD-SCNC: 4.5 MMOL/L (ref 3.4–5.3)
POTASSIUM BLD-SCNC: 4.6 MMOL/L (ref 3.4–5.3)
POTASSIUM BLD-SCNC: 4.6 MMOL/L (ref 3.4–5.3)
POTASSIUM BLD-SCNC: 5 MMOL/L (ref 3.4–5.3)
POTASSIUM BLD-SCNC: 5 MMOL/L (ref 3.4–5.3)
POTASSIUM BLD-SCNC: 5.1 MMOL/L (ref 3.4–5.3)
POTASSIUM BLD-SCNC: 5.1 MMOL/L (ref 3.4–5.3)
POTASSIUM BLD-SCNC: 5.7 MMOL/L (ref 3.4–5.3)
POTASSIUM SERPL-SCNC: 4.2 MMOL/L (ref 3.4–5.3)
POTASSIUM SERPL-SCNC: 4.2 MMOL/L (ref 3.4–5.3)
POTASSIUM SERPL-SCNC: 4.3 MMOL/L (ref 3.4–5.3)
POTASSIUM SERPL-SCNC: 4.4 MMOL/L (ref 3.4–5.3)
POTASSIUM SERPL-SCNC: 4.5 MMOL/L (ref 3.4–5.3)
POTASSIUM SERPL-SCNC: 4.7 MMOL/L (ref 3.4–5.3)
POTASSIUM SERPL-SCNC: 4.8 MMOL/L (ref 3.4–5.3)
POTASSIUM SERPL-SCNC: 4.8 MMOL/L (ref 3.4–5.3)
POTASSIUM SERPL-SCNC: 5 MMOL/L (ref 3.4–5.3)
POTASSIUM SERPL-SCNC: 5.1 MMOL/L (ref 3.4–5.3)
POTASSIUM SERPL-SCNC: 5.3 MMOL/L (ref 3.4–5.3)
POTASSIUM SERPL-SCNC: 5.4 MMOL/L (ref 3.4–5.3)
PROCALCITONIN SERPL-MCNC: 0.47 NG/ML
PROCALCITONIN SERPL-MCNC: 1.05 NG/ML
PROCALCITONIN SERPL-MCNC: 2.53 NG/ML
PROCALCITONIN SERPL-MCNC: <0.05 NG/ML
PROCALCITONIN SERPL-MCNC: <0.05 NG/ML
PROT SERPL-MCNC: 5 G/DL (ref 6.8–8.8)
PROT SERPL-MCNC: 6.9 G/DL (ref 6.8–8.8)
PROT SERPL-MCNC: 7.7 G/DL (ref 6.8–8.8)
RBC # BLD AUTO: 2.23 10E6/UL (ref 4.4–5.9)
RBC # BLD AUTO: 2.51 10E6/UL (ref 4.4–5.9)
RBC # BLD AUTO: 2.57 10E6/UL (ref 4.4–5.9)
RBC # BLD AUTO: 2.58 10E6/UL (ref 4.4–5.9)
RBC # BLD AUTO: 2.62 10E6/UL (ref 4.4–5.9)
RBC # BLD AUTO: 2.65 10E6/UL (ref 4.4–5.9)
RBC # BLD AUTO: 3.22 10E6/UL (ref 4.4–5.9)
RBC # BLD AUTO: 3.35 10E6/UL (ref 4.4–5.9)
RBC # BLD AUTO: 3.4 10E12/L (ref 4.4–5.9)
RBC # BLD AUTO: 3.5 10E12/L (ref 4.4–5.9)
RBC # BLD AUTO: 3.61 10E12/L (ref 4.4–5.9)
RBC # BLD AUTO: 3.61 10E6/UL (ref 4.4–5.9)
RBC # BLD AUTO: 3.75 10E12/L (ref 4.4–5.9)
RBC # BLD AUTO: 3.82 10E12/L (ref 4.4–5.9)
RBC # BLD AUTO: 4.15 10E12/L (ref 4.4–5.9)
RBC #/AREA URNS AUTO: 1 /HPF (ref 0–2)
RBC #/AREA URNS AUTO: >182 /HPF (ref 0–2)
RBC URINE: 3 /HPF
RSV RNA SPEC QL NAA+PROBE: NORMAL
RSV RNA SPEC QL NAA+PROBE: NORMAL
SARS-COV-2 RNA RESP QL NAA+PROBE: NEGATIVE
SARS-COV-2 RNA RESP QL NAA+PROBE: NEGATIVE
SARS-COV-2 RNA SPEC QL NAA+PROBE: NEGATIVE
SARS-COV-2 RNA SPEC QL NAA+PROBE: NORMAL
SODIUM SERPL-SCNC: 119 MMOL/L (ref 133–144)
SODIUM SERPL-SCNC: 121 MMOL/L (ref 133–144)
SODIUM SERPL-SCNC: 123 MMOL/L (ref 133–144)
SODIUM SERPL-SCNC: 123 MMOL/L (ref 133–144)
SODIUM SERPL-SCNC: 124 MMOL/L (ref 133–144)
SODIUM SERPL-SCNC: 126 MMOL/L (ref 133–144)
SODIUM SERPL-SCNC: 126 MMOL/L (ref 133–144)
SODIUM SERPL-SCNC: 127 MMOL/L (ref 133–144)
SODIUM SERPL-SCNC: 127 MMOL/L (ref 133–144)
SODIUM SERPL-SCNC: 128 MMOL/L (ref 133–144)
SODIUM SERPL-SCNC: 128 MMOL/L (ref 133–144)
SODIUM SERPL-SCNC: 131 MMOL/L (ref 133–144)
SODIUM SERPL-SCNC: 132 MMOL/L (ref 133–144)
SODIUM SERPL-SCNC: 133 MMOL/L (ref 133–144)
SODIUM SERPL-SCNC: 134 MMOL/L (ref 133–144)
SODIUM SERPL-SCNC: 134 MMOL/L (ref 133–144)
SODIUM SERPL-SCNC: 138 MMOL/L (ref 133–144)
SODIUM SERPL-SCNC: 139 MMOL/L (ref 133–144)
SODIUM SERPL-SCNC: 144 MMOL/L (ref 133–144)
SODIUM SERPL-SCNC: 153 MMOL/L (ref 133–144)
SODIUM SERPL-SCNC: 155 MMOL/L (ref 133–144)
SODIUM UR-SCNC: 13 MMOL/L
SOURCE: ABNORMAL
SOURCE: ABNORMAL
SP GR UR STRIP: 1.01 (ref 1–1.03)
SP GR UR STRIP: 1.02 (ref 1–1.03)
SP GR UR STRIP: 1.02 (ref 1–1.03)
SPECIMEN EXPIRATION DATE: NORMAL
SPECIMEN SOURCE: NORMAL
TROPONIN I SERPL HS-MCNC: 14 NG/L
TROPONIN I SERPL-MCNC: <0.015 UG/L (ref 0–0.04)
UNIT ABO/RH: NORMAL
UNIT NUMBER: NORMAL
UNIT STATUS: NORMAL
UNIT TYPE ISBT: 5100
UROBILINOGEN UR STRIP-MCNC: 0 MG/DL (ref 0–2)
UROBILINOGEN UR STRIP-MCNC: 0 MG/DL (ref 0–2)
UROBILINOGEN UR STRIP-MCNC: NORMAL MG/DL
VA-%PRED-PRE: 72 %
VA-PRE: 4.23 L
VC-%PRED-PRE: 63 %
VC-PRE: 2.84 L
VC-PRED: 4.44 L
WBC # BLD AUTO: 11 10E9/L (ref 4–11)
WBC # BLD AUTO: 12.1 10E3/UL (ref 4–11)
WBC # BLD AUTO: 12.9 10E3/UL (ref 4–11)
WBC # BLD AUTO: 14.9 10E3/UL (ref 4–11)
WBC # BLD AUTO: 14.9 10E9/L (ref 4–11)
WBC # BLD AUTO: 15.9 10E3/UL (ref 4–11)
WBC # BLD AUTO: 16.2 10E3/UL (ref 4–11)
WBC # BLD AUTO: 17.4 10E3/UL (ref 4–11)
WBC # BLD AUTO: 17.7 10E3/UL (ref 4–11)
WBC # BLD AUTO: 17.7 10E9/L (ref 4–11)
WBC # BLD AUTO: 19.9 10E9/L (ref 4–11)
WBC # BLD AUTO: 20.4 10E3/UL (ref 4–11)
WBC # BLD AUTO: 5.6 10E9/L (ref 4–11)
WBC # BLD AUTO: 7 10E9/L (ref 4–11)
WBC # BLD AUTO: 7.6 10E3/UL (ref 4–11)
WBC #/AREA URNS AUTO: 3 /HPF (ref 0–5)
WBC #/AREA URNS AUTO: 47 /HPF (ref 0–5)
WBC URINE: 1 /HPF

## 2021-01-01 PROCEDURE — 80048 BASIC METABOLIC PNL TOTAL CA: CPT

## 2021-01-01 PROCEDURE — 999N000123 HC STATISTIC OXYGEN O2DAILY TECH TIME

## 2021-01-01 PROCEDURE — 250N000011 HC RX IP 250 OP 636: Performed by: FAMILY MEDICINE

## 2021-01-01 PROCEDURE — 250N000011 HC RX IP 250 OP 636: Performed by: PHYSICIAN ASSISTANT

## 2021-01-01 PROCEDURE — 250N000013 HC RX MED GY IP 250 OP 250 PS 637: Performed by: INTERNAL MEDICINE

## 2021-01-01 PROCEDURE — 258N000003 HC RX IP 258 OP 636: Performed by: FAMILY MEDICINE

## 2021-01-01 PROCEDURE — 83935 ASSAY OF URINE OSMOLALITY: CPT | Performed by: NURSE PRACTITIONER

## 2021-01-01 PROCEDURE — 80053 COMPREHEN METABOLIC PANEL: CPT | Performed by: STUDENT IN AN ORGANIZED HEALTH CARE EDUCATION/TRAINING PROGRAM

## 2021-01-01 PROCEDURE — 85018 HEMOGLOBIN: CPT | Performed by: PHYSICIAN ASSISTANT

## 2021-01-01 PROCEDURE — 94640 AIRWAY INHALATION TREATMENT: CPT | Mod: 76

## 2021-01-01 PROCEDURE — 250N000013 HC RX MED GY IP 250 OP 250 PS 637

## 2021-01-01 PROCEDURE — 82803 BLOOD GASES ANY COMBINATION: CPT | Performed by: PHYSICIAN ASSISTANT

## 2021-01-01 PROCEDURE — 85004 AUTOMATED DIFF WBC COUNT: CPT | Performed by: INTERNAL MEDICINE

## 2021-01-01 PROCEDURE — 999N000097 HC STATISTIC MECHANICAL IN-EXSUFFLATION TREATMENT

## 2021-01-01 PROCEDURE — 94729 DIFFUSING CAPACITY: CPT | Mod: 26 | Performed by: INTERNAL MEDICINE

## 2021-01-01 PROCEDURE — C9113 INJ PANTOPRAZOLE SODIUM, VIA: HCPCS | Performed by: PHYSICIAN ASSISTANT

## 2021-01-01 PROCEDURE — 71275 CT ANGIOGRAPHY CHEST: CPT

## 2021-01-01 PROCEDURE — 250N000009 HC RX 250: Performed by: PHYSICIAN ASSISTANT

## 2021-01-01 PROCEDURE — 250N000011 HC RX IP 250 OP 636: Performed by: INTERNAL MEDICINE

## 2021-01-01 PROCEDURE — 94660 CPAP INITIATION&MGMT: CPT

## 2021-01-01 PROCEDURE — 84145 PROCALCITONIN (PCT): CPT | Performed by: PHYSICIAN ASSISTANT

## 2021-01-01 PROCEDURE — 87636 SARSCOV2 & INF A&B AMP PRB: CPT | Performed by: EMERGENCY MEDICINE

## 2021-01-01 PROCEDURE — 36415 COLL VENOUS BLD VENIPUNCTURE: CPT | Performed by: PHYSICIAN ASSISTANT

## 2021-01-01 PROCEDURE — 999N000157 HC STATISTIC RCP TIME EA 10 MIN

## 2021-01-01 PROCEDURE — 97162 PT EVAL MOD COMPLEX 30 MIN: CPT | Mod: GP

## 2021-01-01 PROCEDURE — 99231 SBSQ HOSP IP/OBS SF/LOW 25: CPT | Performed by: FAMILY MEDICINE

## 2021-01-01 PROCEDURE — 97110 THERAPEUTIC EXERCISES: CPT | Mod: GP

## 2021-01-01 PROCEDURE — 71045 X-RAY EXAM CHEST 1 VIEW: CPT

## 2021-01-01 PROCEDURE — 86900 BLOOD TYPING SEROLOGIC ABO: CPT | Performed by: PHYSICIAN ASSISTANT

## 2021-01-01 PROCEDURE — 999N000105 HC STATISTIC NO DOCUMENTATION TO SUPPORT CHARGE

## 2021-01-01 PROCEDURE — 83735 ASSAY OF MAGNESIUM: CPT | Performed by: HOSPITALIST

## 2021-01-01 PROCEDURE — 96374 THER/PROPH/DIAG INJ IV PUSH: CPT | Performed by: FAMILY MEDICINE

## 2021-01-01 PROCEDURE — 99233 SBSQ HOSP IP/OBS HIGH 50: CPT | Performed by: INTERNAL MEDICINE

## 2021-01-01 PROCEDURE — 200N000001 HC R&B ICU

## 2021-01-01 PROCEDURE — 80048 BASIC METABOLIC PNL TOTAL CA: CPT | Performed by: INTERNAL MEDICINE

## 2021-01-01 PROCEDURE — 83735 ASSAY OF MAGNESIUM: CPT | Performed by: INTERNAL MEDICINE

## 2021-01-01 PROCEDURE — 250N000011 HC RX IP 250 OP 636: Performed by: NURSE PRACTITIONER

## 2021-01-01 PROCEDURE — 94640 AIRWAY INHALATION TREATMENT: CPT

## 2021-01-01 PROCEDURE — 99207 PR CDG-CODE INCORRECT PER BILLING BASED ON TIME: CPT

## 2021-01-01 PROCEDURE — 250N000013 HC RX MED GY IP 250 OP 250 PS 637: Performed by: FAMILY MEDICINE

## 2021-01-01 PROCEDURE — 0011A PR COVID VAC MODERNA 100 MCG/0.5 ML IM: CPT

## 2021-01-01 PROCEDURE — 36415 COLL VENOUS BLD VENIPUNCTURE: CPT | Performed by: INTERNAL MEDICINE

## 2021-01-01 PROCEDURE — 86140 C-REACTIVE PROTEIN: CPT | Performed by: PHYSICIAN ASSISTANT

## 2021-01-01 PROCEDURE — 84145 PROCALCITONIN (PCT): CPT | Performed by: HOSPITALIST

## 2021-01-01 PROCEDURE — 81001 URINALYSIS AUTO W/SCOPE: CPT | Performed by: NURSE PRACTITIONER

## 2021-01-01 PROCEDURE — 120N000001 HC R&B MED SURG/OB

## 2021-01-01 PROCEDURE — 97116 GAIT TRAINING THERAPY: CPT | Mod: GP

## 2021-01-01 PROCEDURE — 258N000003 HC RX IP 258 OP 636: Performed by: INTERNAL MEDICINE

## 2021-01-01 PROCEDURE — 250N000013 HC RX MED GY IP 250 OP 250 PS 637: Performed by: HOSPITALIST

## 2021-01-01 PROCEDURE — 250N000013 HC RX MED GY IP 250 OP 250 PS 637: Performed by: NURSE PRACTITIONER

## 2021-01-01 PROCEDURE — 93010 ELECTROCARDIOGRAM REPORT: CPT | Performed by: INTERNAL MEDICINE

## 2021-01-01 PROCEDURE — 250N000009 HC RX 250: Performed by: INTERNAL MEDICINE

## 2021-01-01 PROCEDURE — 250N000012 HC RX MED GY IP 250 OP 636 PS 637: Performed by: HOSPITALIST

## 2021-01-01 PROCEDURE — 250N000012 HC RX MED GY IP 250 OP 636 PS 637

## 2021-01-01 PROCEDURE — 83735 ASSAY OF MAGNESIUM: CPT | Performed by: NURSE PRACTITIONER

## 2021-01-01 PROCEDURE — 96367 TX/PROPH/DG ADDL SEQ IV INF: CPT | Performed by: EMERGENCY MEDICINE

## 2021-01-01 PROCEDURE — 96365 THER/PROPH/DIAG IV INF INIT: CPT | Performed by: EMERGENCY MEDICINE

## 2021-01-01 PROCEDURE — 80053 COMPREHEN METABOLIC PANEL: CPT | Performed by: FAMILY MEDICINE

## 2021-01-01 PROCEDURE — 87086 URINE CULTURE/COLONY COUNT: CPT | Performed by: EMERGENCY MEDICINE

## 2021-01-01 PROCEDURE — 85027 COMPLETE CBC AUTOMATED: CPT

## 2021-01-01 PROCEDURE — 99233 SBSQ HOSP IP/OBS HIGH 50: CPT

## 2021-01-01 PROCEDURE — 99495 TRANSJ CARE MGMT MOD F2F 14D: CPT | Performed by: FAMILY MEDICINE

## 2021-01-01 PROCEDURE — 5A09557 ASSISTANCE WITH RESPIRATORY VENTILATION, GREATER THAN 96 CONSECUTIVE HOURS, CONTINUOUS POSITIVE AIRWAY PRESSURE: ICD-10-PCS | Performed by: EMERGENCY MEDICINE

## 2021-01-01 PROCEDURE — 250N000012 HC RX MED GY IP 250 OP 636 PS 637: Performed by: PHYSICIAN ASSISTANT

## 2021-01-01 PROCEDURE — 85018 HEMOGLOBIN: CPT | Performed by: INTERNAL MEDICINE

## 2021-01-01 PROCEDURE — 86923 COMPATIBILITY TEST ELECTRIC: CPT | Performed by: PHYSICIAN ASSISTANT

## 2021-01-01 PROCEDURE — 250N000012 HC RX MED GY IP 250 OP 636 PS 637: Performed by: INTERNAL MEDICINE

## 2021-01-01 PROCEDURE — 51702 INSERT TEMP BLADDER CATH: CPT | Performed by: EMERGENCY MEDICINE

## 2021-01-01 PROCEDURE — 99284 EMERGENCY DEPT VISIT MOD MDM: CPT | Mod: 25 | Performed by: EMERGENCY MEDICINE

## 2021-01-01 PROCEDURE — 80048 BASIC METABOLIC PNL TOTAL CA: CPT | Performed by: NURSE PRACTITIONER

## 2021-01-01 PROCEDURE — 85014 HEMATOCRIT: CPT

## 2021-01-01 PROCEDURE — 250N000013 HC RX MED GY IP 250 OP 250 PS 637: Performed by: PHYSICIAN ASSISTANT

## 2021-01-01 PROCEDURE — 83605 ASSAY OF LACTIC ACID: CPT

## 2021-01-01 PROCEDURE — 84145 PROCALCITONIN (PCT): CPT | Performed by: INTERNAL MEDICINE

## 2021-01-01 PROCEDURE — 258N000003 HC RX IP 258 OP 636: Performed by: PHYSICIAN ASSISTANT

## 2021-01-01 PROCEDURE — 80048 BASIC METABOLIC PNL TOTAL CA: CPT | Performed by: PHYSICIAN ASSISTANT

## 2021-01-01 PROCEDURE — 250N000009 HC RX 250: Performed by: NURSE PRACTITIONER

## 2021-01-01 PROCEDURE — 82803 BLOOD GASES ANY COMBINATION: CPT | Performed by: FAMILY MEDICINE

## 2021-01-01 PROCEDURE — 3E043XZ INTRODUCTION OF VASOPRESSOR INTO CENTRAL VEIN, PERCUTANEOUS APPROACH: ICD-10-PCS | Performed by: EMERGENCY MEDICINE

## 2021-01-01 PROCEDURE — C9113 INJ PANTOPRAZOLE SODIUM, VIA: HCPCS | Performed by: INTERNAL MEDICINE

## 2021-01-01 PROCEDURE — 250N000009 HC RX 250: Performed by: FAMILY MEDICINE

## 2021-01-01 PROCEDURE — 83930 ASSAY OF BLOOD OSMOLALITY: CPT | Performed by: NURSE PRACTITIONER

## 2021-01-01 PROCEDURE — 250N000009 HC RX 250: Performed by: EMERGENCY MEDICINE

## 2021-01-01 PROCEDURE — 96375 TX/PRO/DX INJ NEW DRUG ADDON: CPT | Performed by: EMERGENCY MEDICINE

## 2021-01-01 PROCEDURE — 250N000009 HC RX 250

## 2021-01-01 PROCEDURE — 97530 THERAPEUTIC ACTIVITIES: CPT | Mod: GP

## 2021-01-01 PROCEDURE — 83605 ASSAY OF LACTIC ACID: CPT | Performed by: INTERNAL MEDICINE

## 2021-01-01 PROCEDURE — 82805 BLOOD GASES W/O2 SATURATION: CPT | Performed by: NURSE PRACTITIONER

## 2021-01-01 PROCEDURE — 87804 INFLUENZA ASSAY W/OPTIC: CPT | Performed by: FAMILY MEDICINE

## 2021-01-01 PROCEDURE — U0003 INFECTIOUS AGENT DETECTION BY NUCLEIC ACID (DNA OR RNA); SEVERE ACUTE RESPIRATORY SYNDROME CORONAVIRUS 2 (SARS-COV-2) (CORONAVIRUS DISEASE [COVID-19]), AMPLIFIED PROBE TECHNIQUE, MAKING USE OF HIGH THROUGHPUT TECHNOLOGIES AS DESCRIBED BY CMS-2020-01-R: HCPCS | Performed by: FAMILY MEDICINE

## 2021-01-01 PROCEDURE — 99214 OFFICE O/P EST MOD 30 MIN: CPT | Mod: 95 | Performed by: INTERNAL MEDICINE

## 2021-01-01 PROCEDURE — 87040 BLOOD CULTURE FOR BACTERIA: CPT | Performed by: EMERGENCY MEDICINE

## 2021-01-01 PROCEDURE — 99254 IP/OBS CNSLTJ NEW/EST MOD 60: CPT | Performed by: FAMILY MEDICINE

## 2021-01-01 PROCEDURE — 99223 1ST HOSP IP/OBS HIGH 75: CPT | Mod: AI | Performed by: NURSE PRACTITIONER

## 2021-01-01 PROCEDURE — 85027 COMPLETE CBC AUTOMATED: CPT | Performed by: INTERNAL MEDICINE

## 2021-01-01 PROCEDURE — 82375 ASSAY CARBOXYHB QUANT: CPT | Performed by: PHYSICIAN ASSISTANT

## 2021-01-01 PROCEDURE — 96376 TX/PRO/DX INJ SAME DRUG ADON: CPT | Performed by: EMERGENCY MEDICINE

## 2021-01-01 PROCEDURE — 999N000065 XR ABDOMEN PORT 1 VIEWS

## 2021-01-01 PROCEDURE — 36415 COLL VENOUS BLD VENIPUNCTURE: CPT | Performed by: HOSPITALIST

## 2021-01-01 PROCEDURE — 99214 OFFICE O/P EST MOD 30 MIN: CPT | Mod: 95 | Performed by: UROLOGY

## 2021-01-01 PROCEDURE — 76604 US EXAM CHEST: CPT | Performed by: FAMILY MEDICINE

## 2021-01-01 PROCEDURE — 85018 HEMOGLOBIN: CPT

## 2021-01-01 PROCEDURE — 84145 PROCALCITONIN (PCT): CPT | Performed by: NURSE PRACTITIONER

## 2021-01-01 PROCEDURE — 82803 BLOOD GASES ANY COMBINATION: CPT | Performed by: EMERGENCY MEDICINE

## 2021-01-01 PROCEDURE — 99232 SBSQ HOSP IP/OBS MODERATE 35: CPT | Performed by: HOSPITALIST

## 2021-01-01 PROCEDURE — 83605 ASSAY OF LACTIC ACID: CPT | Performed by: STUDENT IN AN ORGANIZED HEALTH CARE EDUCATION/TRAINING PROGRAM

## 2021-01-01 PROCEDURE — C9803 HOPD COVID-19 SPEC COLLECT: HCPCS | Performed by: EMERGENCY MEDICINE

## 2021-01-01 PROCEDURE — 999N001017 HC STATISTIC GLUCOSE BY METER IP

## 2021-01-01 PROCEDURE — 74018 RADEX ABDOMEN 1 VIEW: CPT

## 2021-01-01 PROCEDURE — 99291 CRITICAL CARE FIRST HOUR: CPT | Mod: 25 | Performed by: FAMILY MEDICINE

## 2021-01-01 PROCEDURE — 76604 US EXAM CHEST: CPT | Mod: 26 | Performed by: FAMILY MEDICINE

## 2021-01-01 PROCEDURE — 99232 SBSQ HOSP IP/OBS MODERATE 35: CPT | Performed by: INTERNAL MEDICINE

## 2021-01-01 PROCEDURE — 93306 TTE W/DOPPLER COMPLETE: CPT

## 2021-01-01 PROCEDURE — 94729 DIFFUSING CAPACITY: CPT

## 2021-01-01 PROCEDURE — 250N000011 HC RX IP 250 OP 636: Performed by: EMERGENCY MEDICINE

## 2021-01-01 PROCEDURE — 82803 BLOOD GASES ANY COMBINATION: CPT | Performed by: INTERNAL MEDICINE

## 2021-01-01 PROCEDURE — 84484 ASSAY OF TROPONIN QUANT: CPT | Performed by: FAMILY MEDICINE

## 2021-01-01 PROCEDURE — 99223 1ST HOSP IP/OBS HIGH 75: CPT | Mod: AI | Performed by: PHYSICIAN ASSISTANT

## 2021-01-01 PROCEDURE — 81001 URINALYSIS AUTO W/SCOPE: CPT | Performed by: FAMILY MEDICINE

## 2021-01-01 PROCEDURE — 93306 TTE W/DOPPLER COMPLETE: CPT | Mod: 26 | Performed by: INTERNAL MEDICINE

## 2021-01-01 PROCEDURE — 84295 ASSAY OF SERUM SODIUM: CPT | Performed by: INTERNAL MEDICINE

## 2021-01-01 PROCEDURE — 84484 ASSAY OF TROPONIN QUANT: CPT | Performed by: EMERGENCY MEDICINE

## 2021-01-01 PROCEDURE — 91301 PR COVID VAC MODERNA 100 MCG/0.5 ML IM: CPT

## 2021-01-01 PROCEDURE — 83935 ASSAY OF URINE OSMOLALITY: CPT | Performed by: INTERNAL MEDICINE

## 2021-01-01 PROCEDURE — 36415 COLL VENOUS BLD VENIPUNCTURE: CPT | Performed by: NURSE PRACTITIONER

## 2021-01-01 PROCEDURE — 80048 BASIC METABOLIC PNL TOTAL CA: CPT | Performed by: HOSPITALIST

## 2021-01-01 PROCEDURE — 52000 CYSTOURETHROSCOPY: CPT | Performed by: UROLOGY

## 2021-01-01 PROCEDURE — 84484 ASSAY OF TROPONIN QUANT: CPT | Performed by: NURSE PRACTITIONER

## 2021-01-01 PROCEDURE — 93005 ELECTROCARDIOGRAM TRACING: CPT

## 2021-01-01 PROCEDURE — 85025 COMPLETE CBC W/AUTO DIFF WBC: CPT | Performed by: EMERGENCY MEDICINE

## 2021-01-01 PROCEDURE — 36600 WITHDRAWAL OF ARTERIAL BLOOD: CPT

## 2021-01-01 PROCEDURE — 99285 EMERGENCY DEPT VISIT HI MDM: CPT | Mod: 25 | Performed by: FAMILY MEDICINE

## 2021-01-01 PROCEDURE — 99223 1ST HOSP IP/OBS HIGH 75: CPT | Mod: AI | Performed by: INTERNAL MEDICINE

## 2021-01-01 PROCEDURE — 250N000011 HC RX IP 250 OP 636

## 2021-01-01 PROCEDURE — 99239 HOSP IP/OBS DSCHRG MGMT >30: CPT | Performed by: INTERNAL MEDICINE

## 2021-01-01 PROCEDURE — 93005 ELECTROCARDIOGRAM TRACING: CPT | Performed by: FAMILY MEDICINE

## 2021-01-01 PROCEDURE — 94060 EVALUATION OF WHEEZING: CPT

## 2021-01-01 PROCEDURE — 999N000215 HC STATISTIC HFNC ADULT NON-CPAP

## 2021-01-01 PROCEDURE — 0012A PR COVID VAC MODERNA 100 MCG/0.5 ML IM: CPT

## 2021-01-01 PROCEDURE — 94060 EVALUATION OF WHEEZING: CPT | Mod: 26 | Performed by: INTERNAL MEDICINE

## 2021-01-01 PROCEDURE — 74176 CT ABD & PELVIS W/O CONTRAST: CPT

## 2021-01-01 PROCEDURE — 99285 EMERGENCY DEPT VISIT HI MDM: CPT | Mod: 25

## 2021-01-01 PROCEDURE — 85379 FIBRIN DEGRADATION QUANT: CPT | Performed by: EMERGENCY MEDICINE

## 2021-01-01 PROCEDURE — 84100 ASSAY OF PHOSPHORUS: CPT | Performed by: NURSE PRACTITIONER

## 2021-01-01 PROCEDURE — 93308 TTE F-UP OR LMTD: CPT | Mod: 26 | Performed by: FAMILY MEDICINE

## 2021-01-01 PROCEDURE — 84132 ASSAY OF SERUM POTASSIUM: CPT | Performed by: INTERNAL MEDICINE

## 2021-01-01 PROCEDURE — 999N000065 XR CHEST PORT 1 VIEW

## 2021-01-01 PROCEDURE — 84132 ASSAY OF SERUM POTASSIUM: CPT | Performed by: HOSPITALIST

## 2021-01-01 PROCEDURE — C9803 HOPD COVID-19 SPEC COLLECT: HCPCS | Performed by: FAMILY MEDICINE

## 2021-01-01 PROCEDURE — 51798 US URINE CAPACITY MEASURE: CPT

## 2021-01-01 PROCEDURE — 85025 COMPLETE CBC W/AUTO DIFF WBC: CPT | Performed by: HOSPITALIST

## 2021-01-01 PROCEDURE — 93010 ELECTROCARDIOGRAM REPORT: CPT | Performed by: FAMILY MEDICINE

## 2021-01-01 PROCEDURE — 84100 ASSAY OF PHOSPHORUS: CPT

## 2021-01-01 PROCEDURE — 99284 EMERGENCY DEPT VISIT MOD MDM: CPT | Performed by: FAMILY MEDICINE

## 2021-01-01 PROCEDURE — 93308 TTE F-UP OR LMTD: CPT | Performed by: FAMILY MEDICINE

## 2021-01-01 PROCEDURE — 83036 HEMOGLOBIN GLYCOSYLATED A1C: CPT | Performed by: NURSE PRACTITIONER

## 2021-01-01 PROCEDURE — 84132 ASSAY OF SERUM POTASSIUM: CPT | Performed by: NURSE PRACTITIONER

## 2021-01-01 PROCEDURE — 99291 CRITICAL CARE FIRST HOUR: CPT | Performed by: EMERGENCY MEDICINE

## 2021-01-01 PROCEDURE — 87636 SARSCOV2 & INF A&B AMP PRB: CPT | Performed by: FAMILY MEDICINE

## 2021-01-01 PROCEDURE — 36415 COLL VENOUS BLD VENIPUNCTURE: CPT | Performed by: FAMILY MEDICINE

## 2021-01-01 PROCEDURE — 36416 COLLJ CAPILLARY BLOOD SPEC: CPT | Performed by: EMERGENCY MEDICINE

## 2021-01-01 PROCEDURE — 36415 COLL VENOUS BLD VENIPUNCTURE: CPT | Performed by: EMERGENCY MEDICINE

## 2021-01-01 PROCEDURE — 85004 AUTOMATED DIFF WBC COUNT: CPT | Performed by: EMERGENCY MEDICINE

## 2021-01-01 PROCEDURE — 80048 BASIC METABOLIC PNL TOTAL CA: CPT | Performed by: EMERGENCY MEDICINE

## 2021-01-01 PROCEDURE — 86923 COMPATIBILITY TEST ELECTRIC: CPT

## 2021-01-01 PROCEDURE — 85379 FIBRIN DEGRADATION QUANT: CPT | Performed by: FAMILY MEDICINE

## 2021-01-01 PROCEDURE — 36569 INSJ PICC 5 YR+ W/O IMAGING: CPT

## 2021-01-01 PROCEDURE — 99233 SBSQ HOSP IP/OBS HIGH 50: CPT | Performed by: HOSPITALIST

## 2021-01-01 PROCEDURE — 84300 ASSAY OF URINE SODIUM: CPT | Performed by: INTERNAL MEDICINE

## 2021-01-01 PROCEDURE — 99238 HOSP IP/OBS DSCHRG MGMT 30/<: CPT | Performed by: INTERNAL MEDICINE

## 2021-01-01 PROCEDURE — 81001 URINALYSIS AUTO W/SCOPE: CPT | Performed by: EMERGENCY MEDICINE

## 2021-01-01 PROCEDURE — C9803 HOPD COVID-19 SPEC COLLECT: HCPCS

## 2021-01-01 PROCEDURE — 36415 COLL VENOUS BLD VENIPUNCTURE: CPT | Performed by: STUDENT IN AN ORGANIZED HEALTH CARE EDUCATION/TRAINING PROGRAM

## 2021-01-01 PROCEDURE — 51700 IRRIGATION OF BLADDER: CPT

## 2021-01-01 PROCEDURE — 83605 ASSAY OF LACTIC ACID: CPT | Performed by: EMERGENCY MEDICINE

## 2021-01-01 PROCEDURE — 83735 ASSAY OF MAGNESIUM: CPT

## 2021-01-01 PROCEDURE — 250N000013 HC RX MED GY IP 250 OP 250 PS 637: Performed by: EMERGENCY MEDICINE

## 2021-01-01 PROCEDURE — 85018 HEMOGLOBIN: CPT | Performed by: STUDENT IN AN ORGANIZED HEALTH CARE EDUCATION/TRAINING PROGRAM

## 2021-01-01 PROCEDURE — 82565 ASSAY OF CREATININE: CPT | Performed by: NURSE PRACTITIONER

## 2021-01-01 PROCEDURE — 85025 COMPLETE CBC W/AUTO DIFF WBC: CPT | Performed by: FAMILY MEDICINE

## 2021-01-01 PROCEDURE — 99214 OFFICE O/P EST MOD 30 MIN: CPT | Performed by: INTERNAL MEDICINE

## 2021-01-01 PROCEDURE — 99233 SBSQ HOSP IP/OBS HIGH 50: CPT | Performed by: FAMILY MEDICINE

## 2021-01-01 PROCEDURE — 83880 ASSAY OF NATRIURETIC PEPTIDE: CPT | Performed by: FAMILY MEDICINE

## 2021-01-01 PROCEDURE — 85025 COMPLETE CBC W/AUTO DIFF WBC: CPT | Performed by: NURSE PRACTITIONER

## 2021-01-01 PROCEDURE — 258N000003 HC RX IP 258 OP 636: Performed by: EMERGENCY MEDICINE

## 2021-01-01 PROCEDURE — P9016 RBC LEUKOCYTES REDUCED: HCPCS | Performed by: PHYSICIAN ASSISTANT

## 2021-01-01 PROCEDURE — U0005 INFEC AGEN DETEC AMPLI PROBE: HCPCS | Performed by: FAMILY MEDICINE

## 2021-01-01 PROCEDURE — 80048 BASIC METABOLIC PNL TOTAL CA: CPT | Performed by: FAMILY MEDICINE

## 2021-01-01 PROCEDURE — 272N000579 HC TRAY POWER PICC SOLO 4FR SINGLE LUMEN

## 2021-01-01 PROCEDURE — 85025 COMPLETE CBC W/AUTO DIFF WBC: CPT | Performed by: PHYSICIAN ASSISTANT

## 2021-01-01 PROCEDURE — 85004 AUTOMATED DIFF WBC COUNT: CPT | Performed by: FAMILY MEDICINE

## 2021-01-01 PROCEDURE — P9016 RBC LEUKOCYTES REDUCED: HCPCS

## 2021-01-01 PROCEDURE — 99238 HOSP IP/OBS DSCHRG MGMT 30/<: CPT

## 2021-01-01 PROCEDURE — 85027 COMPLETE CBC AUTOMATED: CPT | Performed by: PHYSICIAN ASSISTANT

## 2021-01-01 PROCEDURE — 80053 COMPREHEN METABOLIC PANEL: CPT | Performed by: EMERGENCY MEDICINE

## 2021-01-01 PROCEDURE — 85049 AUTOMATED PLATELET COUNT: CPT | Performed by: HOSPITALIST

## 2021-01-01 PROCEDURE — 99291 CRITICAL CARE FIRST HOUR: CPT | Mod: 25 | Performed by: EMERGENCY MEDICINE

## 2021-01-01 PROCEDURE — 99243 OFF/OP CNSLTJ NEW/EST LOW 30: CPT | Performed by: UROLOGY

## 2021-01-01 PROCEDURE — 99292 CRITICAL CARE ADDL 30 MIN: CPT | Performed by: EMERGENCY MEDICINE

## 2021-01-01 PROCEDURE — 83605 ASSAY OF LACTIC ACID: CPT | Performed by: NURSE PRACTITIONER

## 2021-01-01 PROCEDURE — 99284 EMERGENCY DEPT VISIT MOD MDM: CPT | Performed by: EMERGENCY MEDICINE

## 2021-01-01 RX ORDER — PREDNISONE 5 MG/1
5 TABLET ORAL DAILY
COMMUNITY
Start: 2021-01-01

## 2021-01-01 RX ORDER — CEFUROXIME AXETIL 500 MG/1
500 TABLET ORAL EVERY 12 HOURS
Qty: 17 TABLET | Refills: 0 | Status: SHIPPED | OUTPATIENT
Start: 2021-01-01 | End: 2021-01-01

## 2021-01-01 RX ORDER — MULTIPLE VITAMINS W/ MINERALS TAB 9MG-400MCG
1 TAB ORAL DAILY
Status: DISCONTINUED | OUTPATIENT
Start: 2021-01-01 | End: 2021-01-01 | Stop reason: HOSPADM

## 2021-01-01 RX ORDER — IOPAMIDOL 755 MG/ML
67 INJECTION, SOLUTION INTRAVASCULAR ONCE
Status: COMPLETED | OUTPATIENT
Start: 2021-01-01 | End: 2021-01-01

## 2021-01-01 RX ORDER — METHYLPREDNISOLONE SODIUM SUCCINATE 125 MG/2ML
125 INJECTION, POWDER, LYOPHILIZED, FOR SOLUTION INTRAMUSCULAR; INTRAVENOUS EVERY 24 HOURS
Status: DISCONTINUED | OUTPATIENT
Start: 2021-01-01 | End: 2021-01-01

## 2021-01-01 RX ORDER — PREDNISONE 10 MG/1
TABLET ORAL
Qty: 30 TABLET | Refills: 0 | Status: SHIPPED | OUTPATIENT
Start: 2021-01-01 | End: 2021-01-01

## 2021-01-01 RX ORDER — ALBUTEROL SULFATE 0.83 MG/ML
2.5 SOLUTION RESPIRATORY (INHALATION)
Status: DISCONTINUED | OUTPATIENT
Start: 2021-01-01 | End: 2021-01-01 | Stop reason: HOSPADM

## 2021-01-01 RX ORDER — BISACODYL 5 MG
5 TABLET, DELAYED RELEASE (ENTERIC COATED) ORAL DAILY PRN
Status: DISCONTINUED | OUTPATIENT
Start: 2021-01-01 | End: 2021-01-01 | Stop reason: HOSPADM

## 2021-01-01 RX ORDER — IPRATROPIUM BROMIDE AND ALBUTEROL SULFATE 2.5; .5 MG/3ML; MG/3ML
3 SOLUTION RESPIRATORY (INHALATION)
Status: DISCONTINUED | OUTPATIENT
Start: 2021-01-01 | End: 2021-01-01

## 2021-01-01 RX ORDER — LIDOCAINE 40 MG/G
CREAM TOPICAL
Status: ACTIVE | OUTPATIENT
Start: 2021-01-01 | End: 2021-01-01

## 2021-01-01 RX ORDER — NALOXONE HYDROCHLORIDE 0.4 MG/ML
0.2 INJECTION, SOLUTION INTRAMUSCULAR; INTRAVENOUS; SUBCUTANEOUS
Status: DISCONTINUED | OUTPATIENT
Start: 2021-01-01 | End: 2021-01-01 | Stop reason: HOSPADM

## 2021-01-01 RX ORDER — AMOXICILLIN 250 MG
2 CAPSULE ORAL 2 TIMES DAILY PRN
Status: DISCONTINUED | OUTPATIENT
Start: 2021-01-01 | End: 2021-01-01 | Stop reason: HOSPADM

## 2021-01-01 RX ORDER — CARBOXYMETHYLCELLULOSE SODIUM 5 MG/ML
1 SOLUTION/ DROPS OPHTHALMIC
Status: DISCONTINUED | OUTPATIENT
Start: 2021-01-01 | End: 2021-01-01

## 2021-01-01 RX ORDER — SODIUM CHLORIDE 9 MG/ML
INJECTION, SOLUTION INTRAVENOUS CONTINUOUS
Status: DISCONTINUED | OUTPATIENT
Start: 2021-01-01 | End: 2021-01-01

## 2021-01-01 RX ORDER — LIDOCAINE HYDROCHLORIDE 20 MG/ML
JELLY TOPICAL ONCE
Status: COMPLETED | OUTPATIENT
Start: 2021-01-01 | End: 2021-01-01

## 2021-01-01 RX ORDER — POLYETHYLENE GLYCOL 3350 17 G/17G
17 POWDER, FOR SOLUTION ORAL DAILY PRN
Status: DISCONTINUED | OUTPATIENT
Start: 2021-01-01 | End: 2021-01-01 | Stop reason: HOSPADM

## 2021-01-01 RX ORDER — HYDROMORPHONE HYDROCHLORIDE 1 MG/ML
0.3 INJECTION, SOLUTION INTRAMUSCULAR; INTRAVENOUS; SUBCUTANEOUS ONCE
Status: DISCONTINUED | OUTPATIENT
Start: 2021-01-01 | End: 2021-01-01

## 2021-01-01 RX ORDER — CIPROFLOXACIN 500 MG/1
500 TABLET, FILM COATED ORAL 2 TIMES DAILY
Qty: 10 TABLET | Refills: 0 | Status: SHIPPED | OUTPATIENT
Start: 2021-01-01 | End: 2021-01-01

## 2021-01-01 RX ORDER — METHYLPREDNISOLONE SODIUM SUCCINATE 125 MG/2ML
60 INJECTION, POWDER, LYOPHILIZED, FOR SOLUTION INTRAMUSCULAR; INTRAVENOUS EVERY 12 HOURS
Status: DISCONTINUED | OUTPATIENT
Start: 2021-01-01 | End: 2021-01-01

## 2021-01-01 RX ORDER — MORPHINE SULFATE 10 MG/5ML
2.5 SOLUTION ORAL EVERY 4 HOURS PRN
COMMUNITY
Start: 2021-01-01

## 2021-01-01 RX ORDER — LISINOPRIL 20 MG/1
20 TABLET ORAL EVERY MORNING
Status: DISCONTINUED | OUTPATIENT
Start: 2021-01-01 | End: 2021-01-01

## 2021-01-01 RX ORDER — AMOXICILLIN 250 MG
2 CAPSULE ORAL 2 TIMES DAILY
Status: DISCONTINUED | OUTPATIENT
Start: 2021-01-01 | End: 2021-01-01 | Stop reason: HOSPADM

## 2021-01-01 RX ORDER — BISACODYL 5 MG
15 TABLET, DELAYED RELEASE (ENTERIC COATED) ORAL DAILY PRN
Status: DISCONTINUED | OUTPATIENT
Start: 2021-01-01 | End: 2021-01-01 | Stop reason: HOSPADM

## 2021-01-01 RX ORDER — LISINOPRIL 10 MG/1
20 TABLET ORAL DAILY
Status: DISCONTINUED | OUTPATIENT
Start: 2021-01-01 | End: 2021-01-01

## 2021-01-01 RX ORDER — ACETAMINOPHEN 325 MG/1
650 TABLET ORAL EVERY 4 HOURS PRN
Status: DISCONTINUED | OUTPATIENT
Start: 2021-01-01 | End: 2021-01-01 | Stop reason: HOSPADM

## 2021-01-01 RX ORDER — TAMSULOSIN HYDROCHLORIDE 0.4 MG/1
0.4 CAPSULE ORAL AT BEDTIME
Qty: 90 CAPSULE | Refills: 1 | Status: SHIPPED | OUTPATIENT
Start: 2021-01-01 | End: 2021-01-01

## 2021-01-01 RX ORDER — SODIUM CHLORIDE 9 MG/ML
INJECTION, SOLUTION INTRAVENOUS CONTINUOUS
Status: ACTIVE | OUTPATIENT
Start: 2021-01-01 | End: 2021-01-01

## 2021-01-01 RX ORDER — CEFUROXIME AXETIL 500 MG/1
500 TABLET ORAL EVERY 12 HOURS SCHEDULED
Status: DISCONTINUED | OUTPATIENT
Start: 2021-01-01 | End: 2021-01-01 | Stop reason: HOSPADM

## 2021-01-01 RX ORDER — ALBUTEROL SULFATE 0.83 MG/ML
2.5 SOLUTION RESPIRATORY (INHALATION)
Status: DISPENSED | OUTPATIENT
Start: 2021-01-01 | End: 2021-01-01

## 2021-01-01 RX ORDER — PREDNISONE 10 MG/1
10 TABLET ORAL DAILY
Qty: 30 TABLET | Refills: 11 | Status: SHIPPED | OUTPATIENT
Start: 2021-01-01

## 2021-01-01 RX ORDER — VANCOMYCIN HYDROCHLORIDE 1 G/200ML
1000 INJECTION, SOLUTION INTRAVENOUS EVERY 12 HOURS
Status: DISCONTINUED | OUTPATIENT
Start: 2021-01-01 | End: 2021-01-01 | Stop reason: HOSPADM

## 2021-01-01 RX ORDER — LEVOFLOXACIN 500 MG/1
500 TABLET, FILM COATED ORAL EVERY 24 HOURS
Qty: 2 TABLET | Refills: 0 | Status: SHIPPED | OUTPATIENT
Start: 2021-01-01 | End: 2021-01-01

## 2021-01-01 RX ORDER — ATORVASTATIN CALCIUM 20 MG/1
20 TABLET, FILM COATED ORAL EVERY MORNING
Status: DISCONTINUED | OUTPATIENT
Start: 2021-01-01 | End: 2021-01-01 | Stop reason: HOSPADM

## 2021-01-01 RX ORDER — IOPAMIDOL 755 MG/ML
80 INJECTION, SOLUTION INTRAVASCULAR ONCE
Status: COMPLETED | OUTPATIENT
Start: 2021-01-01 | End: 2021-01-01

## 2021-01-01 RX ORDER — ALBUTEROL SULFATE 90 UG/1
6 AEROSOL, METERED RESPIRATORY (INHALATION) EVERY 4 HOURS PRN
Status: DISCONTINUED | OUTPATIENT
Start: 2021-01-01 | End: 2021-01-01

## 2021-01-01 RX ORDER — PREDNISONE 10 MG/1
TABLET ORAL
Qty: 20 TABLET | Refills: 0 | Status: SHIPPED | OUTPATIENT
Start: 2021-01-01 | End: 2021-01-01

## 2021-01-01 RX ORDER — AMOXICILLIN 250 MG
1 CAPSULE ORAL 2 TIMES DAILY
Status: DISCONTINUED | OUTPATIENT
Start: 2021-01-01 | End: 2021-01-01 | Stop reason: HOSPADM

## 2021-01-01 RX ORDER — TAMSULOSIN HYDROCHLORIDE 0.4 MG/1
0.4 CAPSULE ORAL AT BEDTIME
Status: DISCONTINUED | OUTPATIENT
Start: 2021-01-01 | End: 2021-01-01 | Stop reason: HOSPADM

## 2021-01-01 RX ORDER — ATORVASTATIN CALCIUM 20 MG/1
20 TABLET, FILM COATED ORAL EVERY MORNING
Status: DISCONTINUED | OUTPATIENT
Start: 2021-01-01 | End: 2021-01-01

## 2021-01-01 RX ORDER — PROCHLORPERAZINE 25 MG
25 SUPPOSITORY, RECTAL RECTAL EVERY 12 HOURS PRN
Status: DISCONTINUED | OUTPATIENT
Start: 2021-01-01 | End: 2021-01-01 | Stop reason: HOSPADM

## 2021-01-01 RX ORDER — OXYCODONE AND ACETAMINOPHEN 5; 325 MG/1; MG/1
1 TABLET ORAL EVERY 4 HOURS PRN
Qty: 2 TABLET | Refills: 0 | Status: SHIPPED | OUTPATIENT
Start: 2021-01-01

## 2021-01-01 RX ORDER — AMLODIPINE BESYLATE 5 MG/1
5 TABLET ORAL DAILY
Status: DISCONTINUED | OUTPATIENT
Start: 2021-01-01 | End: 2021-01-01 | Stop reason: HOSPADM

## 2021-01-01 RX ORDER — BUDESONIDE AND FORMOTEROL FUMARATE DIHYDRATE 160; 4.5 UG/1; UG/1
2 AEROSOL RESPIRATORY (INHALATION) 2 TIMES DAILY
Status: DISCONTINUED | OUTPATIENT
Start: 2021-01-01 | End: 2021-01-01 | Stop reason: CLARIF

## 2021-01-01 RX ORDER — CEFTRIAXONE 1 G/1
1 INJECTION, POWDER, FOR SOLUTION INTRAMUSCULAR; INTRAVENOUS EVERY 24 HOURS
Status: DISCONTINUED | OUTPATIENT
Start: 2021-01-01 | End: 2021-01-01

## 2021-01-01 RX ORDER — DEXTROSE MONOHYDRATE 25 G/50ML
25-50 INJECTION, SOLUTION INTRAVENOUS
Status: DISCONTINUED | OUTPATIENT
Start: 2021-01-01 | End: 2021-01-01 | Stop reason: HOSPADM

## 2021-01-01 RX ORDER — CYCLOBENZAPRINE HCL 10 MG
10 TABLET ORAL 3 TIMES DAILY PRN
Qty: 30 TABLET | Refills: 5 | Status: SHIPPED | OUTPATIENT
Start: 2021-01-01

## 2021-01-01 RX ORDER — ONDANSETRON 2 MG/ML
4 INJECTION INTRAMUSCULAR; INTRAVENOUS EVERY 6 HOURS PRN
Status: DISCONTINUED | OUTPATIENT
Start: 2021-01-01 | End: 2021-01-01

## 2021-01-01 RX ORDER — IPRATROPIUM BROMIDE AND ALBUTEROL SULFATE 2.5; .5 MG/3ML; MG/3ML
3 SOLUTION RESPIRATORY (INHALATION) EVERY 4 HOURS
Status: DISCONTINUED | OUTPATIENT
Start: 2021-01-01 | End: 2021-01-01

## 2021-01-01 RX ORDER — ATORVASTATIN CALCIUM 20 MG/1
20 TABLET, FILM COATED ORAL DAILY
Status: CANCELLED | OUTPATIENT
Start: 2021-01-01

## 2021-01-01 RX ORDER — LISINOPRIL 20 MG/1
20 TABLET ORAL EVERY MORNING
Status: DISCONTINUED | OUTPATIENT
Start: 2021-01-01 | End: 2021-01-01 | Stop reason: HOSPADM

## 2021-01-01 RX ORDER — ONDANSETRON 2 MG/ML
4 INJECTION INTRAMUSCULAR; INTRAVENOUS EVERY 6 HOURS PRN
Status: DISCONTINUED | OUTPATIENT
Start: 2021-01-01 | End: 2021-01-01 | Stop reason: HOSPADM

## 2021-01-01 RX ORDER — NALOXONE HYDROCHLORIDE 0.4 MG/ML
0.4 INJECTION, SOLUTION INTRAMUSCULAR; INTRAVENOUS; SUBCUTANEOUS
Status: DISCONTINUED | OUTPATIENT
Start: 2021-01-01 | End: 2021-01-01 | Stop reason: HOSPADM

## 2021-01-01 RX ORDER — PREDNISONE 20 MG/1
40 TABLET ORAL DAILY
Status: DISCONTINUED | OUTPATIENT
Start: 2021-01-01 | End: 2021-01-01 | Stop reason: HOSPADM

## 2021-01-01 RX ORDER — MORPHINE SULFATE 10 MG/5ML
2.5 SOLUTION ORAL EVERY 4 HOURS PRN
Status: DISCONTINUED | OUTPATIENT
Start: 2021-01-01 | End: 2021-01-01 | Stop reason: HOSPADM

## 2021-01-01 RX ORDER — MORPHINE SULFATE 2 MG/ML
2 INJECTION, SOLUTION INTRAMUSCULAR; INTRAVENOUS EVERY 4 HOURS PRN
Status: DISCONTINUED | OUTPATIENT
Start: 2021-01-01 | End: 2021-01-01

## 2021-01-01 RX ORDER — ONDANSETRON 4 MG/1
4 TABLET, ORALLY DISINTEGRATING ORAL EVERY 6 HOURS PRN
Status: DISCONTINUED | OUTPATIENT
Start: 2021-01-01 | End: 2021-01-01 | Stop reason: HOSPADM

## 2021-01-01 RX ORDER — METHYLPREDNISOLONE SODIUM SUCCINATE 125 MG/2ML
125 INJECTION, POWDER, LYOPHILIZED, FOR SOLUTION INTRAMUSCULAR; INTRAVENOUS ONCE
Status: COMPLETED | OUTPATIENT
Start: 2021-01-01 | End: 2021-01-01

## 2021-01-01 RX ORDER — AMLODIPINE BESYLATE 5 MG/1
TABLET ORAL
Qty: 90 TABLET | Refills: 3 | Status: SHIPPED | OUTPATIENT
Start: 2021-01-01 | End: 2021-01-01

## 2021-01-01 RX ORDER — NITROGLYCERIN 0.4 MG/1
0.4 TABLET SUBLINGUAL EVERY 5 MIN PRN
Status: DISCONTINUED | OUTPATIENT
Start: 2021-01-01 | End: 2021-01-01

## 2021-01-01 RX ORDER — IPRATROPIUM BROMIDE AND ALBUTEROL SULFATE 2.5; .5 MG/3ML; MG/3ML
3 SOLUTION RESPIRATORY (INHALATION) EVERY 4 HOURS
Status: DISCONTINUED | OUTPATIENT
Start: 2021-01-01 | End: 2021-01-01 | Stop reason: HOSPADM

## 2021-01-01 RX ORDER — ALBUTEROL SULFATE 0.83 MG/ML
2.5 SOLUTION RESPIRATORY (INHALATION) EVERY 4 HOURS PRN
Status: DISCONTINUED | OUTPATIENT
Start: 2021-01-01 | End: 2021-01-01 | Stop reason: HOSPADM

## 2021-01-01 RX ORDER — IPRATROPIUM BROMIDE AND ALBUTEROL SULFATE 2.5; .5 MG/3ML; MG/3ML
3 SOLUTION RESPIRATORY (INHALATION)
Status: DISCONTINUED | OUTPATIENT
Start: 2021-01-01 | End: 2021-01-01 | Stop reason: HOSPADM

## 2021-01-01 RX ORDER — DUTASTERIDE 0.5 MG/1
0.5 CAPSULE, LIQUID FILLED ORAL DAILY
Status: DISCONTINUED | OUTPATIENT
Start: 2021-01-01 | End: 2021-01-01 | Stop reason: HOSPADM

## 2021-01-01 RX ORDER — AZITHROMYCIN 500 MG/1
500 INJECTION, POWDER, LYOPHILIZED, FOR SOLUTION INTRAVENOUS EVERY 24 HOURS
Status: COMPLETED | OUTPATIENT
Start: 2021-01-01 | End: 2021-01-01

## 2021-01-01 RX ORDER — NICOTINE 21 MG/24HR
PATCH, TRANSDERMAL 24 HOURS TRANSDERMAL
Qty: 30 PATCH | Refills: 1 | Status: SHIPPED | OUTPATIENT
Start: 2021-01-01 | End: 2021-01-01

## 2021-01-01 RX ORDER — METHYLPREDNISOLONE SODIUM SUCCINATE 40 MG/ML
60 INJECTION, POWDER, LYOPHILIZED, FOR SOLUTION INTRAMUSCULAR; INTRAVENOUS ONCE
Status: COMPLETED | OUTPATIENT
Start: 2021-01-01 | End: 2021-01-01

## 2021-01-01 RX ORDER — LIDOCAINE 40 MG/G
CREAM TOPICAL
Status: DISCONTINUED | OUTPATIENT
Start: 2021-01-01 | End: 2021-01-01

## 2021-01-01 RX ORDER — CYCLOBENZAPRINE HCL 10 MG
10 TABLET ORAL 3 TIMES DAILY PRN
Status: DISCONTINUED | OUTPATIENT
Start: 2021-01-01 | End: 2021-01-01 | Stop reason: HOSPADM

## 2021-01-01 RX ORDER — DUTASTERIDE 0.5 MG/1
0.5 CAPSULE, LIQUID FILLED ORAL DAILY
Qty: 90 CAPSULE | Refills: 3 | Status: SHIPPED | OUTPATIENT
Start: 2021-01-01

## 2021-01-01 RX ORDER — AMOXICILLIN 250 MG
1 CAPSULE ORAL 2 TIMES DAILY PRN
Status: DISCONTINUED | OUTPATIENT
Start: 2021-01-01 | End: 2021-01-01 | Stop reason: HOSPADM

## 2021-01-01 RX ORDER — ASPIRIN 81 MG/1
81 TABLET, CHEWABLE ORAL DAILY
Status: DISCONTINUED | OUTPATIENT
Start: 2021-01-01 | End: 2021-01-01 | Stop reason: HOSPADM

## 2021-01-01 RX ORDER — AMLODIPINE BESYLATE 5 MG/1
5 TABLET ORAL DAILY
Status: CANCELLED | OUTPATIENT
Start: 2021-01-01

## 2021-01-01 RX ORDER — OMEGA-3 FATTY ACIDS/FISH OIL 300-1000MG
400 CAPSULE ORAL EVERY 4 HOURS PRN
Qty: 120 CAPSULE
Start: 2021-01-01

## 2021-01-01 RX ORDER — TAMSULOSIN HYDROCHLORIDE 0.4 MG/1
0.4 CAPSULE ORAL AT BEDTIME
Qty: 30 CAPSULE | Refills: 0 | Status: SHIPPED | OUTPATIENT
Start: 2021-01-01 | End: 2021-01-01

## 2021-01-01 RX ORDER — MORPHINE SULFATE 2 MG/ML
1 INJECTION, SOLUTION INTRAMUSCULAR; INTRAVENOUS
Status: DISCONTINUED | OUTPATIENT
Start: 2021-01-01 | End: 2021-01-01 | Stop reason: HOSPADM

## 2021-01-01 RX ORDER — TAMSULOSIN HYDROCHLORIDE 0.4 MG/1
0.4 CAPSULE ORAL AT BEDTIME
Qty: 90 CAPSULE | Refills: 1 | Status: SHIPPED | OUTPATIENT
Start: 2021-01-01

## 2021-01-01 RX ORDER — NICOTINE 21 MG/24HR
1 PATCH, TRANSDERMAL 24 HOURS TRANSDERMAL DAILY
Status: DISCONTINUED | OUTPATIENT
Start: 2021-01-01 | End: 2021-01-01 | Stop reason: HOSPADM

## 2021-01-01 RX ORDER — LISINOPRIL 10 MG/1
20 TABLET ORAL EVERY MORNING
Status: DISCONTINUED | OUTPATIENT
Start: 2021-01-01 | End: 2021-01-01 | Stop reason: HOSPADM

## 2021-01-01 RX ORDER — ALBUTEROL SULFATE 0.83 MG/ML
2.5 SOLUTION RESPIRATORY (INHALATION) ONCE
Status: COMPLETED | OUTPATIENT
Start: 2021-01-01 | End: 2021-01-01

## 2021-01-01 RX ORDER — LEVOFLOXACIN 500 MG/1
500 TABLET, FILM COATED ORAL EVERY 24 HOURS
Status: DISCONTINUED | OUTPATIENT
Start: 2021-01-01 | End: 2021-01-01 | Stop reason: HOSPADM

## 2021-01-01 RX ORDER — MAGNESIUM HYDROXIDE/ALUMINUM HYDROXICE/SIMETHICONE 120; 1200; 1200 MG/30ML; MG/30ML; MG/30ML
30 SUSPENSION ORAL EVERY 4 HOURS PRN
Status: DISCONTINUED | OUTPATIENT
Start: 2021-01-01 | End: 2021-01-01 | Stop reason: HOSPADM

## 2021-01-01 RX ORDER — LISINOPRIL 10 MG/1
20 TABLET ORAL DAILY
Status: CANCELLED | OUTPATIENT
Start: 2021-01-01

## 2021-01-01 RX ORDER — MORPHINE SULFATE 2 MG/ML
1 INJECTION, SOLUTION INTRAMUSCULAR; INTRAVENOUS EVERY 4 HOURS PRN
Status: DISCONTINUED | OUTPATIENT
Start: 2021-01-01 | End: 2021-01-01

## 2021-01-01 RX ORDER — GUAIFENESIN 600 MG/1
1200 TABLET, EXTENDED RELEASE ORAL 2 TIMES DAILY
Status: DISCONTINUED | OUTPATIENT
Start: 2021-01-01 | End: 2021-01-01 | Stop reason: HOSPADM

## 2021-01-01 RX ORDER — METOCLOPRAMIDE HYDROCHLORIDE 5 MG/ML
10 INJECTION INTRAMUSCULAR; INTRAVENOUS EVERY 6 HOURS PRN
Status: DISCONTINUED | OUTPATIENT
Start: 2021-01-01 | End: 2021-01-01

## 2021-01-01 RX ORDER — MORPHINE SULFATE 10 MG/5ML
2.5 SOLUTION ORAL EVERY 4 HOURS PRN
Qty: 240 ML | Refills: 0 | Status: SHIPPED | OUTPATIENT
Start: 2021-01-01 | End: 2021-01-01

## 2021-01-01 RX ORDER — LORAZEPAM 2 MG/ML
0.5 INJECTION INTRAMUSCULAR ONCE
Status: COMPLETED | OUTPATIENT
Start: 2021-01-01 | End: 2021-01-01

## 2021-01-01 RX ORDER — LORAZEPAM 2 MG/ML
INJECTION INTRAMUSCULAR
Status: COMPLETED
Start: 2021-01-01 | End: 2021-01-01

## 2021-01-01 RX ORDER — SIMETHICONE 40MG/0.6ML
40 SUSPENSION, DROPS(FINAL DOSAGE FORM)(ML) ORAL EVERY 6 HOURS PRN
Status: DISCONTINUED | OUTPATIENT
Start: 2021-01-01 | End: 2021-01-01 | Stop reason: HOSPADM

## 2021-01-01 RX ORDER — PREDNISONE 10 MG/1
TABLET ORAL
Qty: 40 TABLET | Refills: 5 | Status: SHIPPED | OUTPATIENT
Start: 2021-01-01

## 2021-01-01 RX ORDER — AZITHROMYCIN 250 MG/1
250 TABLET, FILM COATED ORAL
COMMUNITY
Start: 2021-01-01

## 2021-01-01 RX ORDER — AZITHROMYCIN 250 MG/1
250 TABLET, FILM COATED ORAL
Status: DISCONTINUED | OUTPATIENT
Start: 2021-01-01 | End: 2021-01-01 | Stop reason: HOSPADM

## 2021-01-01 RX ORDER — GUAIFENESIN 600 MG/1
600 TABLET, EXTENDED RELEASE ORAL 2 TIMES DAILY
Qty: 30 TABLET | Refills: 1 | Status: SHIPPED | OUTPATIENT
Start: 2021-01-01

## 2021-01-01 RX ORDER — PROCHLORPERAZINE MALEATE 5 MG
10 TABLET ORAL EVERY 6 HOURS PRN
Status: DISCONTINUED | OUTPATIENT
Start: 2021-01-01 | End: 2021-01-01 | Stop reason: HOSPADM

## 2021-01-01 RX ORDER — PREDNISONE 10 MG/1
10 TABLET ORAL ONCE
Status: COMPLETED | OUTPATIENT
Start: 2021-01-01 | End: 2021-01-01

## 2021-01-01 RX ORDER — GUAIFENESIN 600 MG/1
600 TABLET, EXTENDED RELEASE ORAL 2 TIMES DAILY
Status: DISCONTINUED | OUTPATIENT
Start: 2021-01-01 | End: 2021-01-01 | Stop reason: HOSPADM

## 2021-01-01 RX ORDER — BISACODYL 5 MG
10 TABLET, DELAYED RELEASE (ENTERIC COATED) ORAL DAILY PRN
Status: DISCONTINUED | OUTPATIENT
Start: 2021-01-01 | End: 2021-01-01 | Stop reason: HOSPADM

## 2021-01-01 RX ORDER — ASPIRIN 81 MG/1
81 TABLET, CHEWABLE ORAL EVERY MORNING
Status: DISCONTINUED | OUTPATIENT
Start: 2021-01-01 | End: 2021-01-01 | Stop reason: HOSPADM

## 2021-01-01 RX ORDER — PROCHLORPERAZINE MALEATE 10 MG
10 TABLET ORAL EVERY 6 HOURS PRN
Status: DISCONTINUED | OUTPATIENT
Start: 2021-01-01 | End: 2021-01-01 | Stop reason: HOSPADM

## 2021-01-01 RX ORDER — BUDESONIDE AND FORMOTEROL FUMARATE DIHYDRATE 160; 4.5 UG/1; UG/1
2 AEROSOL RESPIRATORY (INHALATION) 2 TIMES DAILY
Qty: 10.2 G | Refills: 11 | Status: SHIPPED | OUTPATIENT
Start: 2021-01-01

## 2021-01-01 RX ORDER — CEFTRIAXONE 2 G/1
2 INJECTION, POWDER, FOR SOLUTION INTRAMUSCULAR; INTRAVENOUS EVERY 24 HOURS
Status: DISCONTINUED | OUTPATIENT
Start: 2021-01-01 | End: 2021-01-01

## 2021-01-01 RX ORDER — MORPHINE SULFATE 10 MG/5ML
2.5 SOLUTION ORAL EVERY 4 HOURS PRN
Status: DISCONTINUED | OUTPATIENT
Start: 2021-01-01 | End: 2021-01-01

## 2021-01-01 RX ORDER — BISACODYL 10 MG
10 SUPPOSITORY, RECTAL RECTAL DAILY PRN
Status: DISCONTINUED | OUTPATIENT
Start: 2021-01-01 | End: 2021-01-01 | Stop reason: HOSPADM

## 2021-01-01 RX ORDER — CEPHALEXIN 500 MG/1
500 CAPSULE ORAL 4 TIMES DAILY
Qty: 40 CAPSULE | Refills: 0 | Status: SHIPPED | OUTPATIENT
Start: 2021-01-01 | End: 2021-01-01

## 2021-01-01 RX ORDER — LORAZEPAM 0.5 MG/1
0.5 TABLET ORAL ONCE
Status: COMPLETED | OUTPATIENT
Start: 2021-01-01 | End: 2021-01-01

## 2021-01-01 RX ORDER — ALBUTEROL SULFATE 0.83 MG/ML
3 SOLUTION RESPIRATORY (INHALATION)
Status: DISCONTINUED | OUTPATIENT
Start: 2021-01-01 | End: 2021-01-01

## 2021-01-01 RX ORDER — OXYCODONE HYDROCHLORIDE 5 MG/1
5 TABLET ORAL EVERY 6 HOURS PRN
Qty: 12 TABLET | Refills: 0 | Status: SHIPPED | OUTPATIENT
Start: 2021-01-01 | End: 2021-01-01

## 2021-01-01 RX ORDER — CEFTRIAXONE SODIUM 1 G/50ML
1 INJECTION, SOLUTION INTRAVENOUS EVERY 24 HOURS
Status: DISCONTINUED | OUTPATIENT
Start: 2021-01-01 | End: 2021-01-01 | Stop reason: HOSPADM

## 2021-01-01 RX ORDER — METHYLPREDNISOLONE SODIUM SUCCINATE 125 MG/2ML
60 INJECTION, POWDER, LYOPHILIZED, FOR SOLUTION INTRAMUSCULAR; INTRAVENOUS EVERY 6 HOURS
Status: DISCONTINUED | OUTPATIENT
Start: 2021-01-01 | End: 2021-01-01

## 2021-01-01 RX ORDER — LIDOCAINE 40 MG/G
CREAM TOPICAL
Status: DISCONTINUED | OUTPATIENT
Start: 2021-01-01 | End: 2021-01-01 | Stop reason: HOSPADM

## 2021-01-01 RX ORDER — MORPHINE SULFATE 10 MG/5ML
5 SOLUTION ORAL EVERY 4 HOURS PRN
Qty: 240 ML | Refills: 0 | Status: SHIPPED | OUTPATIENT
Start: 2021-01-01 | End: 2021-01-01

## 2021-01-01 RX ORDER — NICOTINE POLACRILEX 4 MG
15-30 LOZENGE BUCCAL
Status: DISCONTINUED | OUTPATIENT
Start: 2021-01-01 | End: 2021-01-01 | Stop reason: HOSPADM

## 2021-01-01 RX ORDER — LORAZEPAM 2 MG/ML
0.2 INJECTION INTRAMUSCULAR ONCE
Status: DISCONTINUED | OUTPATIENT
Start: 2021-01-01 | End: 2021-01-01

## 2021-01-01 RX ORDER — IPRATROPIUM BROMIDE AND ALBUTEROL SULFATE 2.5; .5 MG/3ML; MG/3ML
3 SOLUTION RESPIRATORY (INHALATION) 4 TIMES DAILY PRN
COMMUNITY
Start: 2021-01-01

## 2021-01-01 RX ORDER — OXYCODONE HYDROCHLORIDE 5 MG/1
5 TABLET ORAL EVERY 4 HOURS PRN
Status: DISCONTINUED | OUTPATIENT
Start: 2021-01-01 | End: 2021-01-01 | Stop reason: HOSPADM

## 2021-01-01 RX ORDER — SODIUM CHLORIDE 9 MG/ML
INJECTION, SOLUTION INTRAVENOUS CONTINUOUS
Status: DISCONTINUED | OUTPATIENT
Start: 2021-01-01 | End: 2021-01-01 | Stop reason: HOSPADM

## 2021-01-01 RX ORDER — PANTOPRAZOLE SODIUM 40 MG/1
40 TABLET, DELAYED RELEASE ORAL
Status: DISCONTINUED | OUTPATIENT
Start: 2021-01-01 | End: 2021-01-01 | Stop reason: HOSPADM

## 2021-01-01 RX ORDER — OXYCODONE AND ACETAMINOPHEN 5; 325 MG/1; MG/1
1 TABLET ORAL ONCE
Status: COMPLETED | OUTPATIENT
Start: 2021-01-01 | End: 2021-01-01

## 2021-01-01 RX ORDER — ASPIRIN 81 MG/1
81 TABLET ORAL DAILY
Status: DISCONTINUED | OUTPATIENT
Start: 2021-01-01 | End: 2021-01-01 | Stop reason: HOSPADM

## 2021-01-01 RX ORDER — CIPROFLOXACIN 500 MG/1
500 TABLET, FILM COATED ORAL ONCE
Status: COMPLETED | OUTPATIENT
Start: 2021-01-01 | End: 2021-01-01

## 2021-01-01 RX ORDER — ROPIVACAINE IN 0.9% SOD CHL/PF 0.1 %
.03-.125 PLASTIC BAG, INJECTION (ML) EPIDURAL CONTINUOUS
Status: DISCONTINUED | OUTPATIENT
Start: 2021-01-01 | End: 2021-01-01

## 2021-01-01 RX ORDER — ASPIRIN 81 MG/1
81 TABLET, CHEWABLE ORAL DAILY
Status: DISCONTINUED | OUTPATIENT
Start: 2021-01-01 | End: 2021-01-01

## 2021-01-01 RX ORDER — LIDOCAINE HYDROCHLORIDE 20 MG/ML
JELLY TOPICAL ONCE
Status: DISCONTINUED | OUTPATIENT
Start: 2021-01-01 | End: 2021-01-01 | Stop reason: HOSPADM

## 2021-01-01 RX ADMIN — GUAIFENESIN 600 MG: 600 TABLET, EXTENDED RELEASE ORAL at 08:59

## 2021-01-01 RX ADMIN — ALBUTEROL SULFATE 2.5 MG: 2.5 SOLUTION RESPIRATORY (INHALATION) at 06:05

## 2021-01-01 RX ADMIN — MORPHINE SULFATE 2.5 MG: 10 SOLUTION ORAL at 10:39

## 2021-01-01 RX ADMIN — MOMETASONE FUROATE AND FORMOTEROL FUMARATE DIHYDRATE 2 PUFF: 200; 5 AEROSOL RESPIRATORY (INHALATION) at 09:25

## 2021-01-01 RX ADMIN — IPRATROPIUM BROMIDE AND ALBUTEROL SULFATE 3 ML: .5; 3 SOLUTION RESPIRATORY (INHALATION) at 03:37

## 2021-01-01 RX ADMIN — GUAIFENESIN 600 MG: 600 TABLET, EXTENDED RELEASE ORAL at 22:20

## 2021-01-01 RX ADMIN — CEFTRIAXONE SODIUM 1 G: 1 INJECTION, POWDER, FOR SOLUTION INTRAMUSCULAR; INTRAVENOUS at 04:45

## 2021-01-01 RX ADMIN — GUAIFENESIN 600 MG: 600 TABLET, EXTENDED RELEASE ORAL at 10:24

## 2021-01-01 RX ADMIN — MOMETASONE FUROATE AND FORMOTEROL FUMARATE DIHYDRATE 2 PUFF: 200; 5 AEROSOL RESPIRATORY (INHALATION) at 00:33

## 2021-01-01 RX ADMIN — ALBUTEROL SULFATE 2.5 MG: 2.5 SOLUTION RESPIRATORY (INHALATION) at 00:05

## 2021-01-01 RX ADMIN — GUAIFENESIN 1200 MG: 600 TABLET ORAL at 20:20

## 2021-01-01 RX ADMIN — NICOTINE 1 PATCH: 14 PATCH, EXTENDED RELEASE TRANSDERMAL at 15:05

## 2021-01-01 RX ADMIN — LISINOPRIL 20 MG: 20 TABLET ORAL at 08:40

## 2021-01-01 RX ADMIN — LISINOPRIL 20 MG: 10 TABLET ORAL at 09:21

## 2021-01-01 RX ADMIN — LIDOCAINE HYDROCHLORIDE 10 ML: 20 JELLY TOPICAL at 23:08

## 2021-01-01 RX ADMIN — Medication 1 TABLET: at 09:30

## 2021-01-01 RX ADMIN — ASPIRIN 81 MG CHEWABLE TABLET 81 MG: 81 TABLET CHEWABLE at 08:04

## 2021-01-01 RX ADMIN — ENOXAPARIN SODIUM 40 MG: 40 INJECTION SUBCUTANEOUS at 15:05

## 2021-01-01 RX ADMIN — AZITHROMYCIN MONOHYDRATE 500 MG: 500 INJECTION, POWDER, LYOPHILIZED, FOR SOLUTION INTRAVENOUS at 06:06

## 2021-01-01 RX ADMIN — NICOTINE 1 PATCH: 14 PATCH, EXTENDED RELEASE TRANSDERMAL at 10:20

## 2021-01-01 RX ADMIN — LIDOCAINE HYDROCHLORIDE 1 ML: 10 INJECTION, SOLUTION EPIDURAL; INFILTRATION; INTRACAUDAL; PERINEURAL at 22:42

## 2021-01-01 RX ADMIN — CEFUROXIME AXETIL 500 MG: 500 TABLET ORAL at 09:21

## 2021-01-01 RX ADMIN — TAMSULOSIN HYDROCHLORIDE 0.4 MG: 0.4 CAPSULE ORAL at 22:00

## 2021-01-01 RX ADMIN — ASPIRIN 81 MG CHEWABLE TABLET 81 MG: 81 TABLET CHEWABLE at 08:59

## 2021-01-01 RX ADMIN — MORPHINE SULFATE 2.5 MG: 10 SOLUTION ORAL at 04:13

## 2021-01-01 RX ADMIN — GUAIFENESIN 600 MG: 600 TABLET ORAL at 19:55

## 2021-01-01 RX ADMIN — IPRATROPIUM BROMIDE AND ALBUTEROL SULFATE 3 ML: .5; 3 SOLUTION RESPIRATORY (INHALATION) at 11:51

## 2021-01-01 RX ADMIN — MORPHINE SULFATE 2.5 MG: 10 SOLUTION ORAL at 09:41

## 2021-01-01 RX ADMIN — NICOTINE 1 PATCH: 14 PATCH, EXTENDED RELEASE TRANSDERMAL at 10:27

## 2021-01-01 RX ADMIN — SODIUM CHLORIDE 94 ML: 9 INJECTION, SOLUTION INTRAVENOUS at 18:30

## 2021-01-01 RX ADMIN — GUAIFENESIN 1200 MG: 600 TABLET ORAL at 08:40

## 2021-01-01 RX ADMIN — LORAZEPAM 0.5 MG: 0.5 TABLET ORAL at 02:14

## 2021-01-01 RX ADMIN — MORPHINE SULFATE 2.5 MG: 10 SOLUTION ORAL at 08:37

## 2021-01-01 RX ADMIN — IPRATROPIUM BROMIDE AND ALBUTEROL SULFATE 3 ML: .5; 3 SOLUTION RESPIRATORY (INHALATION) at 11:23

## 2021-01-01 RX ADMIN — SODIUM CHLORIDE: 9 INJECTION, SOLUTION INTRAVENOUS at 07:52

## 2021-01-01 RX ADMIN — ASPIRIN 81 MG: 81 TABLET ORAL at 07:55

## 2021-01-01 RX ADMIN — PREDNISONE 30 MG: 20 TABLET ORAL at 17:28

## 2021-01-01 RX ADMIN — PANTOPRAZOLE SODIUM 40 MG: 40 TABLET, DELAYED RELEASE ORAL at 16:16

## 2021-01-01 RX ADMIN — SODIUM CHLORIDE: 9 INJECTION, SOLUTION INTRAVENOUS at 06:31

## 2021-01-01 RX ADMIN — VANCOMYCIN HYDROCHLORIDE 1000 MG: 1 INJECTION, SOLUTION INTRAVENOUS at 10:23

## 2021-01-01 RX ADMIN — ENOXAPARIN SODIUM 40 MG: 40 INJECTION SUBCUTANEOUS at 14:20

## 2021-01-01 RX ADMIN — ASPIRIN 81 MG: 81 TABLET ORAL at 08:42

## 2021-01-01 RX ADMIN — IPRATROPIUM BROMIDE AND ALBUTEROL SULFATE 3 ML: .5; 3 SOLUTION RESPIRATORY (INHALATION) at 20:19

## 2021-01-01 RX ADMIN — PREDNISONE 40 MG: 20 TABLET ORAL at 08:40

## 2021-01-01 RX ADMIN — IPRATROPIUM BROMIDE AND ALBUTEROL SULFATE 3 ML: .5; 3 SOLUTION RESPIRATORY (INHALATION) at 20:51

## 2021-01-01 RX ADMIN — GUAIFENESIN 600 MG: 600 TABLET ORAL at 23:18

## 2021-01-01 RX ADMIN — PREDNISONE 30 MG: 20 TABLET ORAL at 11:01

## 2021-01-01 RX ADMIN — IPRATROPIUM BROMIDE AND ALBUTEROL SULFATE 3 ML: .5; 3 SOLUTION RESPIRATORY (INHALATION) at 16:16

## 2021-01-01 RX ADMIN — IPRATROPIUM BROMIDE AND ALBUTEROL SULFATE 3 ML: .5; 3 SOLUTION RESPIRATORY (INHALATION) at 12:12

## 2021-01-01 RX ADMIN — IPRATROPIUM BROMIDE AND ALBUTEROL SULFATE 3 ML: .5; 3 SOLUTION RESPIRATORY (INHALATION) at 05:00

## 2021-01-01 RX ADMIN — METHYLPREDNISOLONE SODIUM SUCCINATE 62.5 MG: 125 INJECTION, POWDER, FOR SOLUTION INTRAMUSCULAR; INTRAVENOUS at 07:28

## 2021-01-01 RX ADMIN — NALOXONE HYDROCHLORIDE 0.4 MG: 0.4 INJECTION, SOLUTION INTRAMUSCULAR; INTRAVENOUS; SUBCUTANEOUS at 12:47

## 2021-01-01 RX ADMIN — IPRATROPIUM BROMIDE AND ALBUTEROL SULFATE 3 ML: .5; 3 SOLUTION RESPIRATORY (INHALATION) at 07:07

## 2021-01-01 RX ADMIN — SODIUM CHLORIDE: 9 INJECTION, SOLUTION INTRAVENOUS at 01:28

## 2021-01-01 RX ADMIN — PREDNISONE 40 MG: 20 TABLET ORAL at 10:38

## 2021-01-01 RX ADMIN — Medication 1 PATCH: at 07:59

## 2021-01-01 RX ADMIN — ATORVASTATIN CALCIUM 20 MG: 20 TABLET, FILM COATED ORAL at 08:04

## 2021-01-01 RX ADMIN — PREDNISONE 40 MG: 20 TABLET ORAL at 09:30

## 2021-01-01 RX ADMIN — IPRATROPIUM BROMIDE AND ALBUTEROL SULFATE 3 ML: .5; 3 SOLUTION RESPIRATORY (INHALATION) at 04:52

## 2021-01-01 RX ADMIN — IPRATROPIUM BROMIDE AND ALBUTEROL SULFATE 3 ML: .5; 3 SOLUTION RESPIRATORY (INHALATION) at 07:24

## 2021-01-01 RX ADMIN — IPRATROPIUM BROMIDE AND ALBUTEROL SULFATE 3 ML: .5; 3 SOLUTION RESPIRATORY (INHALATION) at 11:58

## 2021-01-01 RX ADMIN — AZITHROMYCIN 250 MG: 500 INJECTION, POWDER, LYOPHILIZED, FOR SOLUTION INTRAVENOUS at 08:58

## 2021-01-01 RX ADMIN — AZITHROMYCIN 500 MG: 500 INJECTION, POWDER, LYOPHILIZED, FOR SOLUTION INTRAVENOUS at 05:30

## 2021-01-01 RX ADMIN — PANTOPRAZOLE SODIUM 40 MG: 40 INJECTION, POWDER, FOR SOLUTION INTRAVENOUS at 17:29

## 2021-01-01 RX ADMIN — LORAZEPAM 0.5 MG: 2 INJECTION INTRAMUSCULAR; INTRAVENOUS at 10:01

## 2021-01-01 RX ADMIN — TAMSULOSIN HYDROCHLORIDE 0.4 MG: 0.4 CAPSULE ORAL at 23:14

## 2021-01-01 RX ADMIN — IPRATROPIUM BROMIDE AND ALBUTEROL SULFATE 3 ML: .5; 3 SOLUTION RESPIRATORY (INHALATION) at 07:40

## 2021-01-01 RX ADMIN — PREDNISONE 40 MG: 20 TABLET ORAL at 08:42

## 2021-01-01 RX ADMIN — ATORVASTATIN CALCIUM 20 MG: 20 TABLET, FILM COATED ORAL at 08:33

## 2021-01-01 RX ADMIN — ALBUTEROL SULFATE 2.5 MG: 2.5 SOLUTION RESPIRATORY (INHALATION) at 10:48

## 2021-01-01 RX ADMIN — MOMETASONE FUROATE AND FORMOTEROL FUMARATE DIHYDRATE 2 PUFF: 200; 5 AEROSOL RESPIRATORY (INHALATION) at 21:23

## 2021-01-01 RX ADMIN — ALBUTEROL SULFATE 2.5 MG: 2.5 SOLUTION RESPIRATORY (INHALATION) at 00:01

## 2021-01-01 RX ADMIN — ENOXAPARIN SODIUM 40 MG: 40 INJECTION SUBCUTANEOUS at 14:25

## 2021-01-01 RX ADMIN — MORPHINE SULFATE 2.5 MG: 10 SOLUTION ORAL at 19:46

## 2021-01-01 RX ADMIN — GUAIFENESIN 1200 MG: 600 TABLET ORAL at 19:46

## 2021-01-01 RX ADMIN — PANTOPRAZOLE SODIUM 40 MG: 40 TABLET, DELAYED RELEASE ORAL at 05:37

## 2021-01-01 RX ADMIN — LISINOPRIL 20 MG: 20 TABLET ORAL at 07:29

## 2021-01-01 RX ADMIN — IOPAMIDOL 80 ML: 755 INJECTION, SOLUTION INTRAVENOUS at 18:29

## 2021-01-01 RX ADMIN — IPRATROPIUM BROMIDE AND ALBUTEROL SULFATE 3 ML: .5; 3 SOLUTION RESPIRATORY (INHALATION) at 00:33

## 2021-01-01 RX ADMIN — AZITHROMYCIN MONOHYDRATE 500 MG: 500 INJECTION, POWDER, LYOPHILIZED, FOR SOLUTION INTRAVENOUS at 05:24

## 2021-01-01 RX ADMIN — IPRATROPIUM BROMIDE AND ALBUTEROL SULFATE 3 ML: .5; 3 SOLUTION RESPIRATORY (INHALATION) at 19:11

## 2021-01-01 RX ADMIN — CEFTRIAXONE 2 G: 2 INJECTION, POWDER, FOR SOLUTION INTRAMUSCULAR; INTRAVENOUS at 09:08

## 2021-01-01 RX ADMIN — LISINOPRIL 20 MG: 10 TABLET ORAL at 08:59

## 2021-01-01 RX ADMIN — CEFTRIAXONE 2 G: 2 INJECTION, POWDER, FOR SOLUTION INTRAMUSCULAR; INTRAVENOUS at 08:00

## 2021-01-01 RX ADMIN — LEVOFLOXACIN 500 MG: 500 TABLET, FILM COATED ORAL at 22:27

## 2021-01-01 RX ADMIN — GUAIFENESIN 1200 MG: 600 TABLET ORAL at 10:08

## 2021-01-01 RX ADMIN — IPRATROPIUM BROMIDE AND ALBUTEROL SULFATE 3 ML: .5; 3 SOLUTION RESPIRATORY (INHALATION) at 09:33

## 2021-01-01 RX ADMIN — PROCHLORPERAZINE EDISYLATE 10 MG: 5 INJECTION, SOLUTION INTRAMUSCULAR; INTRAVENOUS at 03:02

## 2021-01-01 RX ADMIN — IPRATROPIUM BROMIDE AND ALBUTEROL SULFATE 3 ML: .5; 3 SOLUTION RESPIRATORY (INHALATION) at 16:09

## 2021-01-01 RX ADMIN — AMLODIPINE BESYLATE 5 MG: 5 TABLET ORAL at 08:59

## 2021-01-01 RX ADMIN — AMLODIPINE BESYLATE 5 MG: 5 TABLET ORAL at 10:19

## 2021-01-01 RX ADMIN — IPRATROPIUM BROMIDE AND ALBUTEROL SULFATE 3 ML: .5; 3 SOLUTION RESPIRATORY (INHALATION) at 11:41

## 2021-01-01 RX ADMIN — IPRATROPIUM BROMIDE AND ALBUTEROL SULFATE 3 ML: .5; 3 SOLUTION RESPIRATORY (INHALATION) at 11:18

## 2021-01-01 RX ADMIN — IPRATROPIUM BROMIDE AND ALBUTEROL SULFATE 3 ML: .5; 3 SOLUTION RESPIRATORY (INHALATION) at 20:35

## 2021-01-01 RX ADMIN — GUAIFENESIN 600 MG: 600 TABLET ORAL at 08:42

## 2021-01-01 RX ADMIN — DUTASTERIDE 0.5 MG: 0.5 CAPSULE, LIQUID FILLED ORAL at 08:34

## 2021-01-01 RX ADMIN — MORPHINE SULFATE 1 MG: 2 INJECTION, SOLUTION INTRAMUSCULAR; INTRAVENOUS at 09:49

## 2021-01-01 RX ADMIN — NICOTINE 1 PATCH: 14 PATCH, EXTENDED RELEASE TRANSDERMAL at 09:00

## 2021-01-01 RX ADMIN — IPRATROPIUM BROMIDE AND ALBUTEROL SULFATE 3 ML: .5; 3 SOLUTION RESPIRATORY (INHALATION) at 03:46

## 2021-01-01 RX ADMIN — IPRATROPIUM BROMIDE AND ALBUTEROL SULFATE 3 ML: .5; 3 SOLUTION RESPIRATORY (INHALATION) at 03:15

## 2021-01-01 RX ADMIN — METHYLPREDNISOLONE SODIUM SUCCINATE 62.5 MG: 125 INJECTION, POWDER, FOR SOLUTION INTRAMUSCULAR; INTRAVENOUS at 08:02

## 2021-01-01 RX ADMIN — ATORVASTATIN CALCIUM 20 MG: 20 TABLET, FILM COATED ORAL at 09:08

## 2021-01-01 RX ADMIN — IPRATROPIUM BROMIDE AND ALBUTEROL SULFATE 3 ML: .5; 3 SOLUTION RESPIRATORY (INHALATION) at 14:55

## 2021-01-01 RX ADMIN — AZITHROMYCIN 500 MG: 500 INJECTION, POWDER, LYOPHILIZED, FOR SOLUTION INTRAVENOUS at 09:08

## 2021-01-01 RX ADMIN — LORAZEPAM 0.5 MG: 2 INJECTION INTRAMUSCULAR; INTRAVENOUS at 03:40

## 2021-01-01 RX ADMIN — ATORVASTATIN CALCIUM 20 MG: 20 TABLET, FILM COATED ORAL at 10:20

## 2021-01-01 RX ADMIN — ASPIRIN 81 MG CHEWABLE TABLET 81 MG: 81 TABLET CHEWABLE at 10:19

## 2021-01-01 RX ADMIN — IPRATROPIUM BROMIDE AND ALBUTEROL SULFATE 3 ML: .5; 3 SOLUTION RESPIRATORY (INHALATION) at 08:14

## 2021-01-01 RX ADMIN — AZITHROMYCIN 250 MG: 500 INJECTION, POWDER, LYOPHILIZED, FOR SOLUTION INTRAVENOUS at 10:04

## 2021-01-01 RX ADMIN — Medication 0.09 MCG/KG/MIN: at 07:22

## 2021-01-01 RX ADMIN — METHYLPREDNISOLONE SODIUM SUCCINATE 62.5 MG: 125 INJECTION, POWDER, FOR SOLUTION INTRAMUSCULAR; INTRAVENOUS at 19:49

## 2021-01-01 RX ADMIN — IPRATROPIUM BROMIDE AND ALBUTEROL SULFATE 3 ML: .5; 3 SOLUTION RESPIRATORY (INHALATION) at 09:40

## 2021-01-01 RX ADMIN — PANTOPRAZOLE SODIUM 40 MG: 40 TABLET, DELAYED RELEASE ORAL at 16:21

## 2021-01-01 RX ADMIN — ENOXAPARIN SODIUM 40 MG: 40 INJECTION SUBCUTANEOUS at 22:13

## 2021-01-01 RX ADMIN — GUAIFENESIN 1200 MG: 600 TABLET ORAL at 08:34

## 2021-01-01 RX ADMIN — IPRATROPIUM BROMIDE AND ALBUTEROL SULFATE 3 ML: .5; 3 SOLUTION RESPIRATORY (INHALATION) at 07:52

## 2021-01-01 RX ADMIN — CEFTRIAXONE SODIUM 1 G: 1 INJECTION, POWDER, FOR SOLUTION INTRAMUSCULAR; INTRAVENOUS at 05:16

## 2021-01-01 RX ADMIN — AZITHROMYCIN MONOHYDRATE 250 MG: 250 TABLET ORAL at 09:30

## 2021-01-01 RX ADMIN — PREDNISONE 30 MG: 20 TABLET ORAL at 17:52

## 2021-01-01 RX ADMIN — DOCUSATE SODIUM 50 MG AND SENNOSIDES 8.6 MG 2 TABLET: 8.6; 5 TABLET, FILM COATED ORAL at 22:19

## 2021-01-01 RX ADMIN — IPRATROPIUM BROMIDE AND ALBUTEROL SULFATE 3 ML: .5; 3 SOLUTION RESPIRATORY (INHALATION) at 18:52

## 2021-01-01 RX ADMIN — AMLODIPINE BESYLATE 5 MG: 5 TABLET ORAL at 10:24

## 2021-01-01 RX ADMIN — IPRATROPIUM BROMIDE AND ALBUTEROL SULFATE 3 ML: .5; 3 SOLUTION RESPIRATORY (INHALATION) at 12:43

## 2021-01-01 RX ADMIN — SODIUM CHLORIDE 8 MG/HR: 9 INJECTION, SOLUTION INTRAVENOUS at 04:47

## 2021-01-01 RX ADMIN — LISINOPRIL 20 MG: 20 TABLET ORAL at 09:08

## 2021-01-01 RX ADMIN — IPRATROPIUM BROMIDE AND ALBUTEROL SULFATE 3 ML: .5; 3 SOLUTION RESPIRATORY (INHALATION) at 00:16

## 2021-01-01 RX ADMIN — ENOXAPARIN SODIUM 40 MG: 40 INJECTION SUBCUTANEOUS at 21:40

## 2021-01-01 RX ADMIN — CIPROFLOXACIN HYDROCHLORIDE 500 MG: 500 TABLET, FILM COATED ORAL at 21:49

## 2021-01-01 RX ADMIN — SODIUM CHLORIDE, POTASSIUM CHLORIDE, SODIUM LACTATE AND CALCIUM CHLORIDE 1000 ML: 600; 310; 30; 20 INJECTION, SOLUTION INTRAVENOUS at 20:02

## 2021-01-01 RX ADMIN — IPRATROPIUM BROMIDE AND ALBUTEROL SULFATE 3 ML: .5; 3 SOLUTION RESPIRATORY (INHALATION) at 20:32

## 2021-01-01 RX ADMIN — OXYCODONE HYDROCHLORIDE AND ACETAMINOPHEN 1 TABLET: 5; 325 TABLET ORAL at 20:27

## 2021-01-01 RX ADMIN — DUTASTERIDE 0.5 MG: 0.5 CAPSULE, LIQUID FILLED ORAL at 10:46

## 2021-01-01 RX ADMIN — SODIUM CHLORIDE: 9 INJECTION, SOLUTION INTRAVENOUS at 00:36

## 2021-01-01 RX ADMIN — IPRATROPIUM BROMIDE AND ALBUTEROL SULFATE 3 ML: .5; 3 SOLUTION RESPIRATORY (INHALATION) at 16:38

## 2021-01-01 RX ADMIN — PANTOPRAZOLE SODIUM 40 MG: 40 TABLET, DELAYED RELEASE ORAL at 06:37

## 2021-01-01 RX ADMIN — AZITHROMYCIN 250 MG: 500 INJECTION, POWDER, LYOPHILIZED, FOR SOLUTION INTRAVENOUS at 09:21

## 2021-01-01 RX ADMIN — IPRATROPIUM BROMIDE AND ALBUTEROL SULFATE 3 ML: .5; 3 SOLUTION RESPIRATORY (INHALATION) at 08:20

## 2021-01-01 RX ADMIN — IPRATROPIUM BROMIDE AND ALBUTEROL SULFATE 3 ML: .5; 3 SOLUTION RESPIRATORY (INHALATION) at 03:54

## 2021-01-01 RX ADMIN — METHYLPREDNISOLONE SODIUM SUCCINATE 62.5 MG: 125 INJECTION, POWDER, FOR SOLUTION INTRAMUSCULAR; INTRAVENOUS at 20:12

## 2021-01-01 RX ADMIN — TAZOBACTAM SODIUM AND PIPERACILLIN SODIUM 3.38 G: 375; 3 INJECTION, SOLUTION INTRAVENOUS at 09:50

## 2021-01-01 RX ADMIN — ONDANSETRON 4 MG: 2 INJECTION INTRAMUSCULAR; INTRAVENOUS at 12:29

## 2021-01-01 RX ADMIN — METHYLPREDNISOLONE SODIUM SUCCINATE 62.5 MG: 125 INJECTION, POWDER, FOR SOLUTION INTRAMUSCULAR; INTRAVENOUS at 19:53

## 2021-01-01 RX ADMIN — ENOXAPARIN SODIUM 40 MG: 100 INJECTION SUBCUTANEOUS at 10:09

## 2021-01-01 RX ADMIN — ATORVASTATIN CALCIUM 20 MG: 20 TABLET, FILM COATED ORAL at 08:42

## 2021-01-01 RX ADMIN — MOMETASONE FUROATE AND FORMOTEROL FUMARATE DIHYDRATE 2 PUFF: 200; 5 AEROSOL RESPIRATORY (INHALATION) at 10:26

## 2021-01-01 RX ADMIN — METHYLPREDNISOLONE SODIUM SUCCINATE 62.5 MG: 125 INJECTION, POWDER, FOR SOLUTION INTRAMUSCULAR; INTRAVENOUS at 09:23

## 2021-01-01 RX ADMIN — MORPHINE SULFATE 2.5 MG: 10 SOLUTION ORAL at 13:02

## 2021-01-01 RX ADMIN — DUTASTERIDE 0.5 MG: 0.5 CAPSULE, LIQUID FILLED ORAL at 08:04

## 2021-01-01 RX ADMIN — MORPHINE SULFATE 1 MG: 2 INJECTION, SOLUTION INTRAMUSCULAR; INTRAVENOUS at 03:56

## 2021-01-01 RX ADMIN — LORAZEPAM 0.5 MG: 2 INJECTION INTRAMUSCULAR at 03:40

## 2021-01-01 RX ADMIN — GUAIFENESIN 600 MG: 600 TABLET ORAL at 07:54

## 2021-01-01 RX ADMIN — NICOTINE 1 PATCH: 14 PATCH, EXTENDED RELEASE TRANSDERMAL at 09:22

## 2021-01-01 RX ADMIN — SODIUM CHLORIDE, POTASSIUM CHLORIDE, SODIUM LACTATE AND CALCIUM CHLORIDE 500 ML: 600; 310; 30; 20 INJECTION, SOLUTION INTRAVENOUS at 18:25

## 2021-01-01 RX ADMIN — IPRATROPIUM BROMIDE AND ALBUTEROL SULFATE 3 ML: .5; 3 SOLUTION RESPIRATORY (INHALATION) at 20:20

## 2021-01-01 RX ADMIN — DOCUSATE SODIUM 50 MG AND SENNOSIDES 8.6 MG 2 TABLET: 8.6; 5 TABLET, FILM COATED ORAL at 08:59

## 2021-01-01 RX ADMIN — IPRATROPIUM BROMIDE AND ALBUTEROL SULFATE 3 ML: .5; 3 SOLUTION RESPIRATORY (INHALATION) at 08:05

## 2021-01-01 RX ADMIN — IPRATROPIUM BROMIDE AND ALBUTEROL SULFATE 3 ML: .5; 3 SOLUTION RESPIRATORY (INHALATION) at 12:20

## 2021-01-01 RX ADMIN — ALBUTEROL SULFATE 2.5 MG: 2.5 SOLUTION RESPIRATORY (INHALATION) at 17:55

## 2021-01-01 RX ADMIN — ONDANSETRON 4 MG: 2 INJECTION INTRAMUSCULAR; INTRAVENOUS at 23:48

## 2021-01-01 RX ADMIN — IPRATROPIUM BROMIDE AND ALBUTEROL SULFATE 3 ML: .5; 3 SOLUTION RESPIRATORY (INHALATION) at 23:15

## 2021-01-01 RX ADMIN — UMECLIDINIUM 1 PUFF: 62.5 AEROSOL, POWDER ORAL at 10:46

## 2021-01-01 RX ADMIN — SODIUM CHLORIDE: 9 INJECTION, SOLUTION INTRAVENOUS at 20:25

## 2021-01-01 RX ADMIN — AZITHROMYCIN MONOHYDRATE 500 MG: 500 INJECTION, POWDER, LYOPHILIZED, FOR SOLUTION INTRAVENOUS at 04:46

## 2021-01-01 RX ADMIN — MORPHINE SULFATE 1 MG: 2 INJECTION, SOLUTION INTRAMUSCULAR; INTRAVENOUS at 14:45

## 2021-01-01 RX ADMIN — ASPIRIN 81 MG CHEWABLE TABLET 81 MG: 81 TABLET CHEWABLE at 09:08

## 2021-01-01 RX ADMIN — LIDOCAINE HYDROCHLORIDE: 20 JELLY TOPICAL at 20:14

## 2021-01-01 RX ADMIN — GUAIFENESIN 1200 MG: 600 TABLET ORAL at 08:04

## 2021-01-01 RX ADMIN — IPRATROPIUM BROMIDE AND ALBUTEROL SULFATE 3 ML: .5; 3 SOLUTION RESPIRATORY (INHALATION) at 07:28

## 2021-01-01 RX ADMIN — TAMSULOSIN HYDROCHLORIDE 0.4 MG: 0.4 CAPSULE ORAL at 22:06

## 2021-01-01 RX ADMIN — IPRATROPIUM BROMIDE AND ALBUTEROL SULFATE 3 ML: .5; 3 SOLUTION RESPIRATORY (INHALATION) at 11:16

## 2021-01-01 RX ADMIN — SODIUM CHLORIDE 8 MG/HR: 9 INJECTION, SOLUTION INTRAVENOUS at 08:28

## 2021-01-01 RX ADMIN — ASPIRIN 81 MG CHEWABLE TABLET 81 MG: 81 TABLET CHEWABLE at 10:25

## 2021-01-01 RX ADMIN — PREDNISONE 30 MG: 20 TABLET ORAL at 08:59

## 2021-01-01 RX ADMIN — TAMSULOSIN HYDROCHLORIDE 0.4 MG: 0.4 CAPSULE ORAL at 22:20

## 2021-01-01 RX ADMIN — NICOTINE 1 PATCH: 14 PATCH, EXTENDED RELEASE TRANSDERMAL at 09:23

## 2021-01-01 RX ADMIN — SODIUM CHLORIDE: 9 INJECTION, SOLUTION INTRAVENOUS at 15:44

## 2021-01-01 RX ADMIN — GUAIFENESIN 1200 MG: 600 TABLET ORAL at 10:38

## 2021-01-01 RX ADMIN — SODIUM CHLORIDE: 9 INJECTION, SOLUTION INTRAVENOUS at 23:06

## 2021-01-01 RX ADMIN — ALBUTEROL SULFATE 2.5 MG: 2.5 SOLUTION RESPIRATORY (INHALATION) at 08:25

## 2021-01-01 RX ADMIN — IOPAMIDOL 67 ML: 755 INJECTION, SOLUTION INTRAVENOUS at 08:21

## 2021-01-01 RX ADMIN — MORPHINE SULFATE 2.5 MG: 10 SOLUTION ORAL at 18:54

## 2021-01-01 RX ADMIN — IPRATROPIUM BROMIDE AND ALBUTEROL SULFATE 3 ML: .5; 3 SOLUTION RESPIRATORY (INHALATION) at 16:03

## 2021-01-01 RX ADMIN — PREDNISONE 30 MG: 20 TABLET ORAL at 10:20

## 2021-01-01 RX ADMIN — ASPIRIN 81 MG CHEWABLE TABLET 81 MG: 81 TABLET CHEWABLE at 07:29

## 2021-01-01 RX ADMIN — METOCLOPRAMIDE HYDROCHLORIDE 10 MG: 5 INJECTION INTRAMUSCULAR; INTRAVENOUS at 01:02

## 2021-01-01 RX ADMIN — SODIUM CHLORIDE: 9 INJECTION, SOLUTION INTRAVENOUS at 05:29

## 2021-01-01 RX ADMIN — ATORVASTATIN CALCIUM 20 MG: 20 TABLET, FILM COATED ORAL at 10:25

## 2021-01-01 RX ADMIN — IPRATROPIUM BROMIDE AND ALBUTEROL SULFATE 3 ML: .5; 3 SOLUTION RESPIRATORY (INHALATION) at 23:22

## 2021-01-01 RX ADMIN — ALBUTEROL SULFATE 2.5 MG: 2.5 SOLUTION RESPIRATORY (INHALATION) at 02:32

## 2021-01-01 RX ADMIN — ONDANSETRON 4 MG: 2 INJECTION INTRAMUSCULAR; INTRAVENOUS at 16:45

## 2021-01-01 RX ADMIN — IPRATROPIUM BROMIDE AND ALBUTEROL SULFATE 3 ML: .5; 3 SOLUTION RESPIRATORY (INHALATION) at 16:06

## 2021-01-01 RX ADMIN — LISINOPRIL 20 MG: 20 TABLET ORAL at 08:42

## 2021-01-01 RX ADMIN — GUAIFENESIN 1200 MG: 600 TABLET ORAL at 20:35

## 2021-01-01 RX ADMIN — ATORVASTATIN CALCIUM 20 MG: 20 TABLET, FILM COATED ORAL at 07:54

## 2021-01-01 RX ADMIN — SODIUM CHLORIDE 95 ML: 9 INJECTION, SOLUTION INTRAVENOUS at 08:21

## 2021-01-01 RX ADMIN — CEFTRIAXONE SODIUM 1 G: 1 INJECTION, POWDER, FOR SOLUTION INTRAMUSCULAR; INTRAVENOUS at 04:33

## 2021-01-01 RX ADMIN — ATORVASTATIN CALCIUM 20 MG: 20 TABLET, FILM COATED ORAL at 09:30

## 2021-01-01 RX ADMIN — ALBUTEROL SULFATE 2.5 MG: 2.5 SOLUTION RESPIRATORY (INHALATION) at 05:40

## 2021-01-01 RX ADMIN — CEFTRIAXONE 2 G: 2 INJECTION, POWDER, FOR SOLUTION INTRAMUSCULAR; INTRAVENOUS at 07:20

## 2021-01-01 RX ADMIN — CEFTRIAXONE SODIUM 1 G: 1 INJECTION, SOLUTION INTRAVENOUS at 05:02

## 2021-01-01 RX ADMIN — GUAIFENESIN 1200 MG: 600 TABLET ORAL at 09:30

## 2021-01-01 RX ADMIN — MORPHINE SULFATE 2.5 MG: 10 SOLUTION ORAL at 11:04

## 2021-01-01 RX ADMIN — IPRATROPIUM BROMIDE AND ALBUTEROL SULFATE 3 ML: .5; 3 SOLUTION RESPIRATORY (INHALATION) at 15:44

## 2021-01-01 RX ADMIN — PROCHLORPERAZINE EDISYLATE 10 MG: 5 INJECTION INTRAMUSCULAR; INTRAVENOUS at 09:21

## 2021-01-01 RX ADMIN — IPRATROPIUM BROMIDE AND ALBUTEROL SULFATE 3 ML: .5; 3 SOLUTION RESPIRATORY (INHALATION) at 12:46

## 2021-01-01 RX ADMIN — MORPHINE SULFATE 1 MG: 2 INJECTION, SOLUTION INTRAMUSCULAR; INTRAVENOUS at 20:09

## 2021-01-01 RX ADMIN — PREDNISONE 40 MG: 20 TABLET ORAL at 07:54

## 2021-01-01 RX ADMIN — ENOXAPARIN SODIUM 40 MG: 40 INJECTION SUBCUTANEOUS at 14:16

## 2021-01-01 RX ADMIN — PANTOPRAZOLE SODIUM 40 MG: 40 TABLET, DELAYED RELEASE ORAL at 06:06

## 2021-01-01 RX ADMIN — METHYLPREDNISOLONE SODIUM SUCCINATE 62.5 MG: 125 INJECTION, POWDER, FOR SOLUTION INTRAMUSCULAR; INTRAVENOUS at 03:11

## 2021-01-01 RX ADMIN — ATORVASTATIN CALCIUM 20 MG: 20 TABLET, FILM COATED ORAL at 08:59

## 2021-01-01 RX ADMIN — PANTOPRAZOLE SODIUM 40 MG: 40 TABLET, DELAYED RELEASE ORAL at 06:12

## 2021-01-01 RX ADMIN — CEFTRIAXONE 2 G: 2 INJECTION, POWDER, FOR SOLUTION INTRAMUSCULAR; INTRAVENOUS at 08:29

## 2021-01-01 RX ADMIN — GUAIFENESIN 600 MG: 600 TABLET, EXTENDED RELEASE ORAL at 09:21

## 2021-01-01 RX ADMIN — IPRATROPIUM BROMIDE AND ALBUTEROL SULFATE 3 ML: .5; 3 SOLUTION RESPIRATORY (INHALATION) at 00:37

## 2021-01-01 RX ADMIN — MORPHINE SULFATE 2.5 MG: 10 SOLUTION ORAL at 05:04

## 2021-01-01 RX ADMIN — LISINOPRIL 20 MG: 20 TABLET ORAL at 20:07

## 2021-01-01 RX ADMIN — IPRATROPIUM BROMIDE AND ALBUTEROL SULFATE 3 ML: .5; 3 SOLUTION RESPIRATORY (INHALATION) at 00:35

## 2021-01-01 RX ADMIN — MORPHINE SULFATE 1 MG: 2 INJECTION, SOLUTION INTRAMUSCULAR; INTRAVENOUS at 10:21

## 2021-01-01 RX ADMIN — GUAIFENESIN 600 MG: 600 TABLET, EXTENDED RELEASE ORAL at 21:16

## 2021-01-01 RX ADMIN — MORPHINE SULFATE 1 MG: 2 INJECTION, SOLUTION INTRAMUSCULAR; INTRAVENOUS at 22:25

## 2021-01-01 RX ADMIN — IPRATROPIUM BROMIDE AND ALBUTEROL SULFATE 3 ML: .5; 3 SOLUTION RESPIRATORY (INHALATION) at 11:33

## 2021-01-01 RX ADMIN — IPRATROPIUM BROMIDE AND ALBUTEROL SULFATE 3 ML: .5; 3 SOLUTION RESPIRATORY (INHALATION) at 03:43

## 2021-01-01 RX ADMIN — DUTASTERIDE 0.5 MG: 0.5 CAPSULE, LIQUID FILLED ORAL at 08:41

## 2021-01-01 RX ADMIN — LIDOCAINE HYDROCHLORIDE: 20 JELLY TOPICAL at 07:13

## 2021-01-01 RX ADMIN — DUTASTERIDE 0.5 MG: 0.5 CAPSULE, LIQUID FILLED ORAL at 09:35

## 2021-01-01 RX ADMIN — ENOXAPARIN SODIUM 40 MG: 40 INJECTION SUBCUTANEOUS at 13:55

## 2021-01-01 RX ADMIN — IPRATROPIUM BROMIDE AND ALBUTEROL SULFATE 3 ML: .5; 3 SOLUTION RESPIRATORY (INHALATION) at 04:54

## 2021-01-01 RX ADMIN — AMLODIPINE BESYLATE 5 MG: 5 TABLET ORAL at 07:54

## 2021-01-01 RX ADMIN — ATORVASTATIN CALCIUM 20 MG: 20 TABLET, FILM COATED ORAL at 08:40

## 2021-01-01 RX ADMIN — PANTOPRAZOLE SODIUM 40 MG: 40 TABLET, DELAYED RELEASE ORAL at 16:37

## 2021-01-01 RX ADMIN — METHYLPREDNISOLONE SODIUM SUCCINATE 62.5 MG: 125 INJECTION, POWDER, FOR SOLUTION INTRAMUSCULAR; INTRAVENOUS at 07:47

## 2021-01-01 RX ADMIN — IPRATROPIUM BROMIDE AND ALBUTEROL SULFATE 3 ML: .5; 3 SOLUTION RESPIRATORY (INHALATION) at 16:20

## 2021-01-01 RX ADMIN — IPRATROPIUM BROMIDE AND ALBUTEROL SULFATE 3 ML: .5; 3 SOLUTION RESPIRATORY (INHALATION) at 19:19

## 2021-01-01 RX ADMIN — IPRATROPIUM BROMIDE AND ALBUTEROL SULFATE 3 ML: .5; 3 SOLUTION RESPIRATORY (INHALATION) at 20:00

## 2021-01-01 RX ADMIN — AZITHROMYCIN MONOHYDRATE 250 MG: 250 TABLET ORAL at 08:40

## 2021-01-01 RX ADMIN — IPRATROPIUM BROMIDE AND ALBUTEROL SULFATE 3 ML: .5; 3 SOLUTION RESPIRATORY (INHALATION) at 15:45

## 2021-01-01 RX ADMIN — LISINOPRIL 20 MG: 20 TABLET ORAL at 08:04

## 2021-01-01 RX ADMIN — MORPHINE SULFATE 2.5 MG: 10 SOLUTION ORAL at 23:48

## 2021-01-01 RX ADMIN — FLUTICASONE FUROATE AND VILANTEROL TRIFENATATE 1 PUFF: 200; 25 POWDER RESPIRATORY (INHALATION) at 07:55

## 2021-01-01 RX ADMIN — IPRATROPIUM BROMIDE AND ALBUTEROL SULFATE 3 ML: .5; 3 SOLUTION RESPIRATORY (INHALATION) at 07:49

## 2021-01-01 RX ADMIN — METHYLPREDNISOLONE SODIUM SUCCINATE 62.5 MG: 125 INJECTION, POWDER, FOR SOLUTION INTRAMUSCULAR; INTRAVENOUS at 20:16

## 2021-01-01 RX ADMIN — IPRATROPIUM BROMIDE AND ALBUTEROL SULFATE 3 ML: .5; 3 SOLUTION RESPIRATORY (INHALATION) at 15:42

## 2021-01-01 RX ADMIN — METHYLPREDNISOLONE SODIUM SUCCINATE 125 MG: 125 INJECTION, POWDER, FOR SOLUTION INTRAMUSCULAR; INTRAVENOUS at 10:09

## 2021-01-01 RX ADMIN — GUAIFENESIN 1200 MG: 600 TABLET ORAL at 07:29

## 2021-01-01 RX ADMIN — AZITHROMYCIN MONOHYDRATE 500 MG: 500 INJECTION, POWDER, LYOPHILIZED, FOR SOLUTION INTRAVENOUS at 05:23

## 2021-01-01 RX ADMIN — IPRATROPIUM BROMIDE AND ALBUTEROL SULFATE 3 ML: .5; 3 SOLUTION RESPIRATORY (INHALATION) at 16:26

## 2021-01-01 RX ADMIN — PREDNISONE 40 MG: 20 TABLET ORAL at 08:34

## 2021-01-01 RX ADMIN — ENOXAPARIN SODIUM 40 MG: 100 INJECTION SUBCUTANEOUS at 09:21

## 2021-01-01 RX ADMIN — METHYLPREDNISOLONE SODIUM SUCCINATE 60 MG: 40 INJECTION, POWDER, FOR SOLUTION INTRAMUSCULAR; INTRAVENOUS at 19:56

## 2021-01-01 RX ADMIN — SODIUM CHLORIDE 8 MG/HR: 9 INJECTION, SOLUTION INTRAVENOUS at 18:37

## 2021-01-01 RX ADMIN — CEFTRIAXONE SODIUM 1 G: 1 INJECTION, POWDER, FOR SOLUTION INTRAMUSCULAR; INTRAVENOUS at 06:26

## 2021-01-01 RX ADMIN — MORPHINE SULFATE 1 MG: 2 INJECTION, SOLUTION INTRAMUSCULAR; INTRAVENOUS at 19:03

## 2021-01-01 RX ADMIN — GUAIFENESIN 1200 MG: 600 TABLET ORAL at 20:16

## 2021-01-01 RX ADMIN — MORPHINE SULFATE 2 MG: 2 INJECTION, SOLUTION INTRAMUSCULAR; INTRAVENOUS at 12:14

## 2021-01-01 RX ADMIN — ONDANSETRON 4 MG: 2 INJECTION INTRAMUSCULAR; INTRAVENOUS at 12:37

## 2021-01-01 RX ADMIN — ASPIRIN 81 MG CHEWABLE TABLET 81 MG: 81 TABLET CHEWABLE at 09:21

## 2021-01-01 RX ADMIN — IPRATROPIUM BROMIDE AND ALBUTEROL SULFATE 3 ML: .5; 3 SOLUTION RESPIRATORY (INHALATION) at 00:32

## 2021-01-01 RX ADMIN — FLUTICASONE FUROATE AND VILANTEROL TRIFENATATE 1 PUFF: 200; 25 POWDER RESPIRATORY (INHALATION) at 10:46

## 2021-01-01 RX ADMIN — IPRATROPIUM BROMIDE AND ALBUTEROL SULFATE 3 ML: .5; 3 SOLUTION RESPIRATORY (INHALATION) at 19:23

## 2021-01-01 RX ADMIN — TAMSULOSIN HYDROCHLORIDE 0.4 MG: 0.4 CAPSULE ORAL at 20:17

## 2021-01-01 RX ADMIN — LISINOPRIL 20 MG: 20 TABLET ORAL at 07:54

## 2021-01-01 RX ADMIN — ALBUTEROL SULFATE 2.5 MG: 2.5 SOLUTION RESPIRATORY (INHALATION) at 10:14

## 2021-01-01 RX ADMIN — OXYCODONE HYDROCHLORIDE 5 MG: 5 TABLET ORAL at 13:17

## 2021-01-01 RX ADMIN — PREDNISONE 10 MG: 10 TABLET ORAL at 14:20

## 2021-01-01 RX ADMIN — MORPHINE SULFATE 2.5 MG: 10 SOLUTION ORAL at 18:16

## 2021-01-01 RX ADMIN — AMLODIPINE BESYLATE 5 MG: 5 TABLET ORAL at 08:42

## 2021-01-01 RX ADMIN — CEFTRIAXONE 2 G: 2 INJECTION, POWDER, FOR SOLUTION INTRAMUSCULAR; INTRAVENOUS at 07:33

## 2021-01-01 RX ADMIN — GUAIFENESIN 600 MG: 600 TABLET, EXTENDED RELEASE ORAL at 21:22

## 2021-01-01 RX ADMIN — FLUTICASONE FUROATE AND VILANTEROL TRIFENATATE 1 PUFF: 200; 25 POWDER RESPIRATORY (INHALATION) at 08:42

## 2021-01-01 RX ADMIN — IPRATROPIUM BROMIDE AND ALBUTEROL SULFATE 3 ML: .5; 3 SOLUTION RESPIRATORY (INHALATION) at 23:23

## 2021-01-01 RX ADMIN — SODIUM CHLORIDE 8 MG/HR: 9 INJECTION, SOLUTION INTRAVENOUS at 22:28

## 2021-01-01 RX ADMIN — SODIUM CHLORIDE 8 MG/HR: 9 INJECTION, SOLUTION INTRAVENOUS at 15:25

## 2021-01-01 RX ADMIN — GUAIFENESIN 1200 MG: 600 TABLET ORAL at 20:07

## 2021-01-01 RX ADMIN — PREDNISONE 40 MG: 20 TABLET ORAL at 09:21

## 2021-01-01 RX ADMIN — ALBUTEROL SULFATE 2.5 MG: 2.5 SOLUTION RESPIRATORY (INHALATION) at 18:41

## 2021-01-01 RX ADMIN — Medication 0.03 MCG/KG/MIN: at 18:28

## 2021-01-01 RX ADMIN — MORPHINE SULFATE 2.5 MG: 10 SOLUTION ORAL at 23:13

## 2021-01-01 RX ADMIN — TAMSULOSIN HYDROCHLORIDE 0.4 MG: 0.4 CAPSULE ORAL at 21:50

## 2021-01-01 RX ADMIN — MORPHINE SULFATE 2.5 MG: 10 SOLUTION ORAL at 00:32

## 2021-01-01 RX ADMIN — MORPHINE SULFATE 2 MG: 2 INJECTION, SOLUTION INTRAMUSCULAR; INTRAVENOUS at 09:22

## 2021-01-01 RX ADMIN — IPRATROPIUM BROMIDE AND ALBUTEROL SULFATE 3 ML: .5; 3 SOLUTION RESPIRATORY (INHALATION) at 21:13

## 2021-01-01 RX ADMIN — METHYLPREDNISOLONE SODIUM SUCCINATE 125 MG: 125 INJECTION, POWDER, FOR SOLUTION INTRAMUSCULAR; INTRAVENOUS at 15:06

## 2021-01-01 RX ADMIN — GUAIFENESIN 600 MG: 600 TABLET, EXTENDED RELEASE ORAL at 10:19

## 2021-01-01 RX ADMIN — AZITHROMYCIN 250 MG: 500 INJECTION, POWDER, LYOPHILIZED, FOR SOLUTION INTRAVENOUS at 09:19

## 2021-01-01 RX ADMIN — ENOXAPARIN SODIUM 40 MG: 100 INJECTION SUBCUTANEOUS at 10:05

## 2021-01-01 RX ADMIN — LISINOPRIL 20 MG: 10 TABLET ORAL at 10:19

## 2021-01-01 RX ADMIN — IPRATROPIUM BROMIDE AND ALBUTEROL SULFATE 3 ML: .5; 3 SOLUTION RESPIRATORY (INHALATION) at 19:44

## 2021-01-01 RX ADMIN — Medication 0.09 MCG/KG/MIN: at 18:38

## 2021-01-01 RX ADMIN — MORPHINE SULFATE 1 MG: 2 INJECTION, SOLUTION INTRAMUSCULAR; INTRAVENOUS at 15:03

## 2021-01-01 RX ADMIN — IPRATROPIUM BROMIDE AND ALBUTEROL SULFATE 3 ML: .5; 3 SOLUTION RESPIRATORY (INHALATION) at 07:48

## 2021-01-01 RX ADMIN — ALUMINUM HYDROXIDE, MAGNESIUM HYDROXIDE, AND SIMETHICONE 30 ML: 200; 200; 20 SUSPENSION ORAL at 14:24

## 2021-01-01 RX ADMIN — AMLODIPINE BESYLATE 5 MG: 5 TABLET ORAL at 09:21

## 2021-01-01 RX ADMIN — LISINOPRIL 20 MG: 10 TABLET ORAL at 10:25

## 2021-01-01 RX ADMIN — IPRATROPIUM BROMIDE AND ALBUTEROL SULFATE 3 ML: .5; 3 SOLUTION RESPIRATORY (INHALATION) at 08:27

## 2021-01-01 RX ADMIN — IPRATROPIUM BROMIDE AND ALBUTEROL SULFATE 3 ML: .5; 3 SOLUTION RESPIRATORY (INHALATION) at 09:49

## 2021-01-01 RX ADMIN — LEVOFLOXACIN 500 MG: 500 TABLET, FILM COATED ORAL at 22:13

## 2021-01-01 RX ADMIN — METHYLPREDNISOLONE SODIUM SUCCINATE 62.5 MG: 125 INJECTION, POWDER, FOR SOLUTION INTRAMUSCULAR; INTRAVENOUS at 08:42

## 2021-01-01 RX ADMIN — PREDNISONE 30 MG: 20 TABLET ORAL at 10:25

## 2021-01-01 RX ADMIN — ATORVASTATIN CALCIUM 20 MG: 20 TABLET, FILM COATED ORAL at 09:22

## 2021-01-01 RX ADMIN — IPRATROPIUM BROMIDE AND ALBUTEROL SULFATE 3 ML: .5; 3 SOLUTION RESPIRATORY (INHALATION) at 12:30

## 2021-01-01 RX ADMIN — PREDNISONE 30 MG: 20 TABLET ORAL at 18:14

## 2021-01-01 RX ADMIN — DUTASTERIDE 0.5 MG: 0.5 CAPSULE, LIQUID FILLED ORAL at 10:05

## 2021-01-01 RX ADMIN — Medication 1 PATCH: at 08:42

## 2021-01-01 RX ADMIN — ATORVASTATIN CALCIUM 20 MG: 20 TABLET, FILM COATED ORAL at 07:29

## 2021-01-01 RX ADMIN — IPRATROPIUM BROMIDE AND ALBUTEROL SULFATE 3 ML: .5; 3 SOLUTION RESPIRATORY (INHALATION) at 04:30

## 2021-01-01 RX ADMIN — GUAIFENESIN 600 MG: 600 TABLET, EXTENDED RELEASE ORAL at 21:20

## 2021-01-01 RX ADMIN — GUAIFENESIN 1200 MG: 600 TABLET ORAL at 08:33

## 2021-01-01 RX ADMIN — IPRATROPIUM BROMIDE AND ALBUTEROL SULFATE 3 ML: .5; 3 SOLUTION RESPIRATORY (INHALATION) at 20:30

## 2021-01-01 RX ADMIN — MORPHINE SULFATE 2.5 MG: 10 SOLUTION ORAL at 22:00

## 2021-01-01 ASSESSMENT — ACTIVITIES OF DAILY LIVING (ADL)
ADLS_ACUITY_SCORE: 19
ADLS_ACUITY_SCORE: 11
ADLS_ACUITY_SCORE: 23
ADLS_ACUITY_SCORE: 12
ADLS_ACUITY_SCORE: 13
ADLS_ACUITY_SCORE: 21
ADLS_ACUITY_SCORE: 13
ADLS_ACUITY_SCORE: 23
ADLS_ACUITY_SCORE: 9
ADLS_ACUITY_SCORE: 15
ADLS_ACUITY_SCORE: 13
ADLS_ACUITY_SCORE: 12
ADLS_ACUITY_SCORE: 17
ADLS_ACUITY_SCORE: 3
ADLS_ACUITY_SCORE: 13
ADLS_ACUITY_SCORE: 13
ADLS_ACUITY_SCORE: 9
ADLS_ACUITY_SCORE: 23
ADLS_ACUITY_SCORE: 11
ADLS_ACUITY_SCORE: 13
ADLS_ACUITY_SCORE: 11
ADLS_ACUITY_SCORE: 15
ADLS_ACUITY_SCORE: 23
ADLS_ACUITY_SCORE: 12
ADLS_ACUITY_SCORE: 9
ADLS_ACUITY_SCORE: 11
ADLS_ACUITY_SCORE: 9
ADLS_ACUITY_SCORE: 13
ADLS_ACUITY_SCORE: 18
TOILETING_ISSUES: YES
ADLS_ACUITY_SCORE: 11
ADLS_ACUITY_SCORE: 9
ADLS_ACUITY_SCORE: 15
ADLS_ACUITY_SCORE: 11
ADLS_ACUITY_SCORE: 13
ADLS_ACUITY_SCORE: 23
ADLS_ACUITY_SCORE: 13
ADLS_ACUITY_SCORE: 9
ADLS_ACUITY_SCORE: 9
ADLS_ACUITY_SCORE: 13
ADLS_ACUITY_SCORE: 21
ADLS_ACUITY_SCORE: 15
ADLS_ACUITY_SCORE: 23
ADLS_ACUITY_SCORE: 9
ADLS_ACUITY_SCORE: 11
ADLS_ACUITY_SCORE: 13
ADLS_ACUITY_SCORE: 9
ADLS_ACUITY_SCORE: 17
ADLS_ACUITY_SCORE: 15
ADLS_ACUITY_SCORE: 24
ADLS_ACUITY_SCORE: 13
ADLS_ACUITY_SCORE: 15
ADLS_ACUITY_SCORE: 11
ADLS_ACUITY_SCORE: 11
ADLS_ACUITY_SCORE: 7
ADLS_ACUITY_SCORE: 9
ADLS_ACUITY_SCORE: 11
ADLS_ACUITY_SCORE: 15
ADLS_ACUITY_SCORE: 15
ADLS_ACUITY_SCORE: 7
TOILETING_MANAGEMENT: BSC
ADLS_ACUITY_SCORE: 23
ADLS_ACUITY_SCORE: 3
ADLS_ACUITY_SCORE: 13
ADLS_ACUITY_SCORE: 11
ADLS_ACUITY_SCORE: 11
ADLS_ACUITY_SCORE: 9
ADLS_ACUITY_SCORE: 13
ADLS_ACUITY_SCORE: 9
ADLS_ACUITY_SCORE: 13
ADLS_ACUITY_SCORE: 11
ADLS_ACUITY_SCORE: 9
ADLS_ACUITY_SCORE: 11
DOING_ERRANDS_INDEPENDENTLY_DIFFICULTY: YES
ADLS_ACUITY_SCORE: 11
ADLS_ACUITY_SCORE: 17
ADLS_ACUITY_SCORE: 19
ADLS_ACUITY_SCORE: 7
ADLS_ACUITY_SCORE: 9
ADLS_ACUITY_SCORE: 24
ADLS_ACUITY_SCORE: 15
ADLS_ACUITY_SCORE: 9
ADLS_ACUITY_SCORE: 9
ADLS_ACUITY_SCORE: 11
ADLS_ACUITY_SCORE: 13
ADLS_ACUITY_SCORE: 23
WHICH_OF_THE_ABOVE_FUNCTIONAL_RISKS_HAD_A_RECENT_ONSET_OR_CHANGE?: AMBULATION
ADLS_ACUITY_SCORE: 12
ADLS_ACUITY_SCORE: 7
ADLS_ACUITY_SCORE: 9
ADLS_ACUITY_SCORE: 12
ADLS_ACUITY_SCORE: 15
ADLS_ACUITY_SCORE: 7
ADLS_ACUITY_SCORE: 11
ADLS_ACUITY_SCORE: 23
ADLS_ACUITY_SCORE: 12
ADLS_ACUITY_SCORE: 23
ADLS_ACUITY_SCORE: 9
ADLS_ACUITY_SCORE: 11
ADLS_ACUITY_SCORE: 13
ADLS_ACUITY_SCORE: 13
ADLS_ACUITY_SCORE: 15
ADLS_ACUITY_SCORE: 13
ADLS_ACUITY_SCORE: 15
ADLS_ACUITY_SCORE: 15
ADLS_ACUITY_SCORE: 23
ADLS_ACUITY_SCORE: 19
ADLS_ACUITY_SCORE: 24
ADLS_ACUITY_SCORE: 12
ADLS_ACUITY_SCORE: 15
ADLS_ACUITY_SCORE: 11
ADLS_ACUITY_SCORE: 13
ADLS_ACUITY_SCORE: 13
ADLS_ACUITY_SCORE: 12
ADLS_ACUITY_SCORE: 9
ADLS_ACUITY_SCORE: 9
ADLS_ACUITY_SCORE: 13
ADLS_ACUITY_SCORE: 13
ADLS_ACUITY_SCORE: 23
ADLS_ACUITY_SCORE: 13
ADLS_ACUITY_SCORE: 7
ADLS_ACUITY_SCORE: 11
ADLS_ACUITY_SCORE: 9
ADLS_ACUITY_SCORE: 9
ADLS_ACUITY_SCORE: 15
ADLS_ACUITY_SCORE: 11
ADLS_ACUITY_SCORE: 13
ADLS_ACUITY_SCORE: 15
ADLS_ACUITY_SCORE: 7
ADLS_ACUITY_SCORE: 11
ADLS_ACUITY_SCORE: 13
ADLS_ACUITY_SCORE: 19
ADLS_ACUITY_SCORE: 23
ADLS_ACUITY_SCORE: 15
ADLS_ACUITY_SCORE: 7
ADLS_ACUITY_SCORE: 13
ADLS_ACUITY_SCORE: 19
ADLS_ACUITY_SCORE: 15
ADLS_ACUITY_SCORE: 13
ADLS_ACUITY_SCORE: 23
ADLS_ACUITY_SCORE: 19
ADLS_ACUITY_SCORE: 13
ADLS_ACUITY_SCORE: 7
ADLS_ACUITY_SCORE: 15
ADLS_ACUITY_SCORE: 11
ADLS_ACUITY_SCORE: 23
ADLS_ACUITY_SCORE: 14
ADLS_ACUITY_SCORE: 13
ADLS_ACUITY_SCORE: 23
ADLS_ACUITY_SCORE: 15
ADLS_ACUITY_SCORE: 12
ADLS_ACUITY_SCORE: 15
ADLS_ACUITY_SCORE: 9
ADLS_ACUITY_SCORE: 21
ADLS_ACUITY_SCORE: 17
ADLS_ACUITY_SCORE: 9
ADLS_ACUITY_SCORE: 11
ADLS_ACUITY_SCORE: 12
ADLS_ACUITY_SCORE: 23
ADLS_ACUITY_SCORE: 9
ADLS_ACUITY_SCORE: 12
ADLS_ACUITY_SCORE: 13
ADLS_ACUITY_SCORE: 11
ADLS_ACUITY_SCORE: 15
ADLS_ACUITY_SCORE: 9
ADLS_ACUITY_SCORE: 13
ADLS_ACUITY_SCORE: 17
ADLS_ACUITY_SCORE: 9
ADLS_ACUITY_SCORE: 14
ADLS_ACUITY_SCORE: 11
ADLS_ACUITY_SCORE: 9
ADLS_ACUITY_SCORE: 12
ADLS_ACUITY_SCORE: 11
ADLS_ACUITY_SCORE: 23
ADLS_ACUITY_SCORE: 3
ADLS_ACUITY_SCORE: 13
ADLS_ACUITY_SCORE: 15
ADLS_ACUITY_SCORE: 3
ADLS_ACUITY_SCORE: 12
ADLS_ACUITY_SCORE: 22
ADLS_ACUITY_SCORE: 13
ADLS_ACUITY_SCORE: 15
ADLS_ACUITY_SCORE: 7
ADLS_ACUITY_SCORE: 24
ADLS_ACUITY_SCORE: 13
ADLS_ACUITY_SCORE: 15
ADLS_ACUITY_SCORE: 11
ADLS_ACUITY_SCORE: 9
ADLS_ACUITY_SCORE: 9
ADLS_ACUITY_SCORE: 3
ADLS_ACUITY_SCORE: 12
ADLS_ACUITY_SCORE: 12
ADLS_ACUITY_SCORE: 13
ADLS_ACUITY_SCORE: 11
ADLS_ACUITY_SCORE: 23
ADLS_ACUITY_SCORE: 13
ADLS_ACUITY_SCORE: 13
FALL_HISTORY_WITHIN_LAST_SIX_MONTHS: NO
ADLS_ACUITY_SCORE: 12
ADLS_ACUITY_SCORE: 9
ADLS_ACUITY_SCORE: 12
ADLS_ACUITY_SCORE: 7
ADLS_ACUITY_SCORE: 13
ADLS_ACUITY_SCORE: 13
ADLS_ACUITY_SCORE: 11
TOILETING_ASSISTANCE: TOILETING DIFFICULTY, REQUIRES EQUIPMENT;TOILETING DIFFICULTY, ASSISTANCE 1 PERSON
ADLS_ACUITY_SCORE: 9
ADLS_ACUITY_SCORE: 15
ADLS_ACUITY_SCORE: 17
ADLS_ACUITY_SCORE: 11
ADLS_ACUITY_SCORE: 13
ADLS_ACUITY_SCORE: 22
ADLS_ACUITY_SCORE: 21
ADLS_ACUITY_SCORE: 9
ADLS_ACUITY_SCORE: 17
ADLS_ACUITY_SCORE: 11
ADLS_ACUITY_SCORE: 13
ADLS_ACUITY_SCORE: 9
ADLS_ACUITY_SCORE: 13
ADLS_ACUITY_SCORE: 7
ADLS_ACUITY_SCORE: 23
ADLS_ACUITY_SCORE: 15
ADLS_ACUITY_SCORE: 14
ADLS_ACUITY_SCORE: 11
ADLS_ACUITY_SCORE: 13
ADLS_ACUITY_SCORE: 17
ADLS_ACUITY_SCORE: 23
ADLS_ACUITY_SCORE: 23
ADLS_ACUITY_SCORE: 12
ADLS_ACUITY_SCORE: 13
ADLS_ACUITY_SCORE: 11
ADLS_ACUITY_SCORE: 15
ADLS_ACUITY_SCORE: 13
ADLS_ACUITY_SCORE: 11
ADLS_ACUITY_SCORE: 11
ADLS_ACUITY_SCORE: 13
ADLS_ACUITY_SCORE: 16
DRESSING/BATHING: BATHING DIFFICULTY, ASSISTANCE 1 PERSON
ADLS_ACUITY_SCORE: 13
ADLS_ACUITY_SCORE: 13
ADLS_ACUITY_SCORE: 15
ADLS_ACUITY_SCORE: 13
ADLS_ACUITY_SCORE: 12
ADLS_ACUITY_SCORE: 13
ADLS_ACUITY_SCORE: 15
ADLS_ACUITY_SCORE: 14
ADLS_ACUITY_SCORE: 13
DRESSING/BATHING_DIFFICULTY: YES

## 2021-01-01 ASSESSMENT — ENCOUNTER SYMPTOMS
SINUS PRESSURE: 0
ALLERGIC/IMMUNOLOGIC NEGATIVE: 1
SORE THROAT: 0
EYES NEGATIVE: 1
NEUROLOGICAL NEGATIVE: 1
COUGH: 0
NAUSEA: 0
DIARRHEA: 0
MUSCULOSKELETAL NEGATIVE: 1
WHEEZING: 0
SHORTNESS OF BREATH: 1
CONSTITUTIONAL NEGATIVE: 1
COUGH: 1
FEVER: 0
BLOOD IN STOOL: 0
GASTROINTESTINAL NEGATIVE: 1
ABDOMINAL PAIN: 0
FEVER: 0
ENDOCRINE NEGATIVE: 1
SHORTNESS OF BREATH: 1
VOMITING: 0
FREQUENCY: 0
HEMATOLOGIC/LYMPHATIC NEGATIVE: 1
PALPITATIONS: 0
CONSTIPATION: 0
CHILLS: 0
CARDIOVASCULAR NEGATIVE: 1
WHEEZING: 1
PSYCHIATRIC NEGATIVE: 1
SHORTNESS OF BREATH: 1
HEADACHES: 0
ABDOMINAL PAIN: 0
DYSURIA: 0
DIAPHORESIS: 0

## 2021-01-01 ASSESSMENT — MIFFLIN-ST. JEOR
SCORE: 1391.63
SCORE: 1433.63
SCORE: 1401.6
SCORE: 1425.88
SCORE: 1395.63
SCORE: 1380.52
SCORE: 1380.63
SCORE: 1366.91
SCORE: 1403.63
SCORE: 1379.63
SCORE: 1393.67

## 2021-01-01 ASSESSMENT — PAIN SCALES - GENERAL: PAINLEVEL: NO PAIN (0)

## 2021-01-05 PROBLEM — J96.20 ACUTE ON CHRONIC RESPIRATORY FAILURE (H): Status: ACTIVE | Noted: 2021-01-01

## 2021-01-05 NOTE — ED NOTES
MD notified of patient decreased BP and decreased LOC. Pt is arrousable to repeated painful stimuli. Pt is able to then follow commands.

## 2021-01-05 NOTE — ED NOTES
Pt continues to pull the bipap tubing apart. Pt is no longer in Covid precautions as he tested negative and influenza negative. Ambulance arrived and will be loading pt to go to [facility]

## 2021-01-05 NOTE — H&P
[facility]    History and Physical - Hospitalist Service       Date of Admission:  (Not on file)    Assessment & Plan   Camacho Covington is a 60 year old male w/ PMH of O2 dependent emphysematous COPD, active 1.5ppd smoker, HLD, HTN and pulmonary nodules admitted on 01/05/21 with acute on chronic hypoxic respiratory failure.     acute on chronic hypoxic and hypercarbic respiratory failure.   AECOPD 2/2 LLL CAP and ongoing tobacco use, suspected bacterial etiology, covid and flu are neg.  Low suspicion for VTE w/ neg Ddimer  2L O2 dependent emphysematous COPD  active 1.5ppd smoker  pulmonary nodules  --O2 supplementation and/or NIPPV with either bilevel ventilation or HFNC  --Recheck ABG now to see if he can transition NIPPV and go back and forth between now attempt high flow  --SPO2 goal greater than equal to 88%  --steroids  --continue antibiotics with ceftriaxone & azithromycin  --bronchodilators scheduled and as needed  --Hold PTA Dulera  --Nicotine patch  --I did not specifically address smoking cessation but this will need to be done prior to discharge  --I did confirm with him CODE STATUS of DNR/DNI  -We will check echocardiogram to rule out right heart failure  -We will check electrocardiogram and repeat troponin to evaluate for ACS  -Check procalcitonin to confirm that this is a lower respiratory tract infection    Hyponatremia, acute on chronic baseline 122-131.  No fluids given in the ED level is rising.  This is probably secondary to his chronic lung disease, suspect SIADH but cannot rule out right heart failure component  -Recheck BMP in the morning    HLD  HTN   --Continue PTA amlodipine, aspirin, Lipitor, lisinopril       Diet:  N.p.o. while on NIPPV  DVT Prophylaxis: Enoxaparin (Lovenox) SQ  Smith Catheter: not present  Code Status:  DNR/DNI  Rule Out COVID-19 Handoff:  Camacho is a LOW SUSPICION PUI.  Follow these instructions:    If COVID test positive -> continue isolation  precautions    If COVID test negative -> discontinue COVID-specific isolation precautions     Disposition Plan   Expected discharge: 2 - 3 days, recommended to prior living arrangement once Once respiratory status recovers back to baseline    Entered: FAZAL Dumont CNP 01/05/2021, 9:31 AM     The patient's care was discussed with the Attending Physician, Dr. Dr. Hassan and Bedside Nurse.    FAZAL Dumont CNP  [facility]  Contact information available via [facility] Paging/Directory    Total time 35 minutes of which 20 minutes was face-to-face time remainder spent in encounter and coordination of  ______________________________________________________________________    Chief Complaint   dyspnea    History is obtained from the patient and EMRi     History of Present Illness   Camacho Covington is a 60 year old male w/ PMH of O2 dependent emphysematous COPD, active 1/2 to 1.5ppd smoker, HLD, HTN and pulmonary nodules who presented to [facility] on the early am 01/05/21 by EMS with acute on chronic ARF w/ onset the afternoon prior w/ 12-14h of progressive dyspnea and increasing FiO2 frequirements form 2L up to 3-5L.  On EMS arrival SPO2 was 88% on 3L.  He received IV steroids by EMS and for increased WOB was started on NIPPV en route to hospital.  He initially was placed on bilevel ventilation but did not tolerate it so was changed to cpap and ultimately required IV lorazepam 0.5mg to help tolerate NIPPV.    Lab data showed leukocytosis of 19.9, normal D-dimer, and a negative troponin, He had a resp acidosis on abg. Sodium is 127, covid & influenza were both negative. He was diagnosed w/ CAP in the LLL and AECOPD.  Bld cultures were sent from ED and he was started on ceftriaxone and azithromycin.  Code status was confirmed as DNR/DNI by ED providers. He was given a trial of NIPPV in the ED with minimal improvement.   [facility] not able to accommodate pt at that facility on NIPPV  so he was transferred to [facility] for further evaluation & mgmt.  Patient reports he has been compliant with his inhalers well has of oxygen.  He continues to endorse dyspnea and really dislikes the bilevel ventilator.  He denies any other significant complaints    Review of Systems    The 10 point Review of Systems is negative other than noted in the HPI or here.     Past Medical History    I have reviewed this patient's medical history and updated it with pertinent information if needed.   No past medical history on file.  Hypertension  Hyperlipidemia  COPD    Past Surgical History   I have reviewed this patient's surgical history and updated it with pertinent information if needed.  No past surgical history on file.    Social History   I have reviewed this patient's social history and updated it with pertinent information if needed.  Social History     Tobacco Use     Smoking status: Current Every Day Smoker     Packs/day: 0.50     Years: 44.00     Pack years: 22.00     Types: Cigarettes     Smokeless tobacco: Never Used     Tobacco comment: started at age 13,  3-4 cigs daily   Substance Use Topics     Alcohol use: No     Drug use: No       Family History   I have reviewed this patient's family history and updated it with pertinent information if needed.  Family History   Problem Relation Age of Onset     Other Cancer Father         lung     Cerebrovascular Disease Father        Prior to Admission Medications   Cannot display prior to admission medications because the patient has not been admitted in this contact.     Medications Prior to Admission   Medication Sig Dispense Refill Last Dose     albuterol (VENTOLIN HFA) 108 (90 Base) MCG/ACT inhaler INHALE 2 PUFFS INTO THE LUNGS EVERY 4 HOURS AS NEEDED FOR SHORTNESS OF BREATH, DIFFICULTY BREATHING OR WHEEZING. 18 g 11      amLODIPine (NORVASC) 5 MG tablet TAKE ONE TABLET BY MOUTH ONCE DAILY (Patient taking differently: Take 5 mg by mouth daily ) 90 tablet 0       aspirin 81 MG tablet Take 1 tablet (81 mg) by mouth daily 90 tablet 3      atorvastatin (LIPITOR) 20 MG tablet Take 1 tablet (20 mg) by mouth daily 90 tablet 3      ibuprofen 200 MG capsule Take 200 mg by mouth every 4 hours as needed for fever 120 capsule       ipratropium-albuterol (COMBIVENT RESPIMAT)  MCG/ACT inhaler INHALE ONE PUFF BY MOUTH FOUR TIMES A DAY DUE FOR PHYSICAL (Patient taking differently: Inhale 1 puff into the lungs 4 times daily INHALE ONE PUFF BY MOUTH FOUR TIMES A DAY DUE FOR PHYSICAL) 4 g 11      lisinopril (ZESTRIL) 20 MG tablet Take 1 tablet (20 mg) by mouth daily 90 tablet 3      mometasone-formoterol (DULERA) 200-5 MCG/ACT inhaler Inhale 2 puffs into the lungs 2 times daily 1 Inhaler 11      multivitamin, therapeutic with minerals (MULTI-VITAMIN) TABS tablet Take 1 tablet by mouth daily 100 tablet 3      nicotine (NICODERM CQ) 21 MG/24HR 24 hr patch Place 1 patch onto the skin every 24 hours Place 21mg nicotine patches once daily for 6-8 weeks, then 14mg patches once daily for 2 weeks and finally 7mg patches once daily for 2 weeks. 30 patch 5      order for DME Equipment being ordered: Oxygen with portability. 2 liters per nasal cannula continuous. 1 Device 0      varenicline (CHANTIX) 1 MG tablet Take 1 tablet (1 mg) by mouth 2 times daily (Patient not taking: Reported on 11/20/2020) 60 tablet 2      Allergies   No Known Allergies    Physical Exam   Vital Signs:                    Weight: 0 lbs 0 oz    General: Elderly man appears older than stated age  Neuro: +follows commands wiggle toes and show 2 fingers bilat, face symmetric, tongue midline, speech fluent  HEENT: NC/AT, pupils equal round 3mm briskly reactive bilat, sclera nonicteric, noninjected, conjunctiva pink, exam is limited by the BiPAP mask  Neck: supple  Heart: S1S2, normal sinus rhythm  Lungs: Tachypneic with respiratory rate 24, barrel-shaped chest, rales and rhonchi in the left lower lobe, inspiratory wheeze in the  right lower lobe and left upper lobe  Abd:normoactive bowel sounds, soft, nontender, nondisteded  Ext: +1 bipedal edema    Data   Data reviewed today: I reviewed all medications, new labs and imaging results over the last 24 hours. I personally reviewed the EKG tracing showing NSR, normal axis.    Recent Labs   Lab 01/05/21  1449 01/05/21  0949 01/05/21  0340   WBC  --   --  19.9*   HGB  --   --  13.4   MCV  --   --  95   PLT  --   --  279   NA  --  127* 123*   POTASSIUM 4.8 4.8 5.0   CHLORIDE  --  92* 88*   CO2  --  35* 34*   BUN  --  16 15   CR 0.35* 0.35* 0.34*   ANIONGAP  --  <1* 1*   JUANA  --  8.3* 8.6   GLC  --  135* 157*   ALBUMIN  --   --  3.3*   PROTTOTAL  --   --  6.9   BILITOTAL  --   --  0.6   ALKPHOS  --   --  70   ALT  --   --  20   AST  --   --  12   TROPI <0.015  --  <0.015

## 2021-01-05 NOTE — PHARMACY-VANCOMYCIN DOSING SERVICE
Pharmacy Vancomycin Initial Note  Date of Service 2021  Patient's  1960  60 year old, male    Indication: Community Acquired Pneumonia    Current estimated CrCl = CrCl cannot be calculated (Unknown ideal weight.).  Of note the patient's actual weight is only 60.7 kg.  Using this weight in the calculation,  the CrCl would be >70 ml/min    Creatinine for last 3 days  2021:  3:40 AM Creatinine 0.34 mg/dL    Recent Vancomycin Level(s) for last 3 days  No results found for requested labs within last 72 hours.      Vancomycin IV Administrations (past 72 hours)      No vancomycin orders with administrations in past 72 hours.                Nephrotoxins and other renal medications (From now, onward)    Start     Dose/Rate Route Frequency Ordered Stop    21 0950  vancomycin (VANCOCIN) 1000 mg in dextrose 5% 200 mL PREMIX      1,000 mg  200 mL/hr over 1 Hours Intravenous EVERY 12 HOURS 21 0946      21 0935  piperacillin-tazobactam (ZOSYN) infusion 3.375 g      3.375 g  over 30 Minutes Intravenous ONCE 21 0930            Contrast Orders - past 72 hours (72h ago, onward)    None                Plan:  1.  Start vancomycin  1000 mg IV q12h.   2.  Goal Trough Level: 10-15 mg/L   3.  Pharmacy will check trough levels as appropriate in 1-3 Days.    4. Serum creatinine levels will be ordered daily for the first week of therapy and at least twice weekly for subsequent weeks.    5. [place] method utilized to dose vancomycin therapy: Method 1    Efrain Cobb [facility]

## 2021-01-05 NOTE — ED NOTES
8:42 AM  Most recent gas shows improvement on the BiPAP.  Plan to recheck another gas at around 9:30 AM.  Unfortunately have no beds available in the foreseeable future at our facility here and I do not have an ICU bed for BiPAP admission.  The patient will need to be transferred and I requested [facility].  Per my outgoing colleagues initial note the patient is DNR/DNI with family agreement.     Norman River MD  01/05/21 4751

## 2021-01-05 NOTE — PROGRESS NOTES
Upon BiPAP check heppa filter replaced in line post whisper valve. Please see flowsheet for parameters.Continue to follow.

## 2021-01-05 NOTE — ED NOTES
DATE:  1/5/2021   TIME OF RECEIPT FROM LAB:  0051  LAB TEST:  PCO2  LAB VALUE:  82  RESULTS GIVEN WITH READ-BACK TO (PROVIDER):  Mal Francis, *  TIME LAB VALUE REPORTED TO PROVIDER:   0052

## 2021-01-05 NOTE — ED NOTES
Emergency Department Patient Sign-out       Brief HPI:  This is a 60 year old male signed out to me by Dr. Francis .  See initial ED Provider note for details of the presentation.            Significant Events prior to my assuming care: Initial critical care, history and physical exam, lab diagnostics and imaging studies.  Tentative plan initially for transfer the patient before adopting a DNR/DNI.  The patient is currently on BiPAP and we are watching him closely with respect to him his blood gas for improvement.  Family realized that he is critically ill and may not survive this.  If he improves on the BiPAP would consider admitting him to our facility here when a bed becomes available later in the day and I have taken him in signout from my departing colleague's understanding.  He has pneumonia and acute hypoxic respiratory failure.      Exam:   Patient Vitals for the past 24 hrs:   BP Temp Temp src Pulse Resp SpO2 Weight   01/05/21 0645 107/66 -- -- 89 26 97 % --   01/05/21 0630 (!) 126/99 -- -- 88 27 97 % --   01/05/21 0615 123/73 -- -- 89 18 95 % --   01/05/21 0600 113/87 -- -- 88 26 96 % --   01/05/21 0545 134/65 -- -- 93 22 90 % --   01/05/21 0530 104/73 -- -- 88 (!) 42 -- --   01/05/21 0515 (!) 88/50 -- -- 70 21 96 % --   01/05/21 0500 -- -- -- 76 24 96 % --   01/05/21 0445 -- -- -- 82 28 95 % --   01/05/21 0435 (!) 82/43 -- -- 72 19 93 % --   01/05/21 0430 -- -- -- 73 26 93 % --   01/05/21 0415 94/52 -- -- 82 -- 94 % --   01/05/21 0400 118/84 -- -- 88 30 92 % --   01/05/21 0353 -- -- -- -- -- -- 60.7 kg (133 lb 12.8 oz)   01/05/21 0352 106/78 97.7  F (36.5  C) Axillary 95 16 94 % --   01/05/21 0334 132/64 -- -- 94 22 93 % --           ED RESULTS:   Results for orders placed or performed during the hospital encounter of 01/05/21 (from the past 24 hour(s))   CBC with platelets differential     Status: Abnormal    Collection Time: 01/05/21  3:40 AM   Result Value Ref Range    WBC 19.9 (H) 4.0 - 11.0  10e9/L    RBC Count 4.15 (L) 4.4 - 5.9 10e12/L    Hemoglobin 13.4 13.3 - 17.7 g/dL    Hematocrit 39.2 (L) 40.0 - 53.0 %    MCV 95 78 - 100 fl    MCH 32.3 26.5 - 33.0 pg    MCHC 34.2 31.5 - 36.5 g/dL    RDW 12.1 10.0 - 15.0 %    Platelet Count 279 150 - 450 10e9/L    Diff Method Automated Method     % Neutrophils 90.7 %    % Lymphocytes 2.9 %    % Monocytes 5.9 %    % Eosinophils 0.0 %    % Basophils 0.2 %    % Immature Granulocytes 0.3 %    Nucleated RBCs 0 0 /100    Absolute Neutrophil 18.1 (H) 1.6 - 8.3 10e9/L    Absolute Lymphocytes 0.6 (L) 0.8 - 5.3 10e9/L    Absolute Monocytes 1.2 0.0 - 1.3 10e9/L    Absolute Eosinophils 0.0 0.0 - 0.7 10e9/L    Absolute Basophils 0.0 0.0 - 0.2 10e9/L    Abs Immature Granulocytes 0.1 0 - 0.4 10e9/L    Absolute Nucleated RBC 0.0    Comprehensive metabolic panel     Status: Abnormal    Collection Time: 01/05/21  3:40 AM   Result Value Ref Range    Sodium 123 (L) 133 - 144 mmol/L    Potassium 5.0 3.4 - 5.3 mmol/L    Chloride 88 (L) 94 - 109 mmol/L    Carbon Dioxide 34 (H) 20 - 32 mmol/L    Anion Gap 1 (L) 3 - 14 mmol/L    Glucose 157 (H) 70 - 99 mg/dL    Urea Nitrogen 15 7 - 30 mg/dL    Creatinine 0.34 (L) 0.66 - 1.25 mg/dL    GFR Estimate >90 >60 mL/min/[1.73_m2]    GFR Estimate If Black >90 >60 mL/min/[1.73_m2]    Calcium 8.6 8.5 - 10.1 mg/dL    Bilirubin Total 0.6 0.2 - 1.3 mg/dL    Albumin 3.3 (L) 3.4 - 5.0 g/dL    Protein Total 6.9 6.8 - 8.8 g/dL    Alkaline Phosphatase 70 40 - 150 U/L    ALT 20 0 - 70 U/L    AST 12 0 - 45 U/L   D dimer quantitative     Status: None    Collection Time: 01/05/21  3:40 AM   Result Value Ref Range    D Dimer 0.3 0.0 - 0.50 ug/ml FEU   Troponin I     Status: None    Collection Time: 01/05/21  3:40 AM   Result Value Ref Range    Troponin I ES <0.015 0.000 - 0.045 ug/L   Blood gas venous     Status: Abnormal    Collection Time: 01/05/21  3:40 AM   Result Value Ref Range    Ph Venous 7.30 (L) 7.32 - 7.43 pH    PCO2 Venous 75 (H) 40 - 50 mm Hg    PO2  Venous 54 (H) 25 - 47 mm Hg    Bicarbonate Venous 37 (H) 21 - 28 mmol/L    Base Excess Venous 7.4 mmol/L   XR Chest Port 1 View     Status: None    Collection Time: 01/05/21  4:09 AM    Narrative    EXAM: XR CHEST PORT 1 VW  LOCATION: [facility]  DATE/TIME: 1/5/2021 4:08 AM    INDICATION: Acute respiratory distress with history of COPD.  COMPARISON: 04/06/2018, CT 03/18/2019      Impression    IMPRESSION: New area of increased opacity involving left lung base relating to infiltrate associated with pneumonia. Continued radiographic follow-up recommended. Minimal left basilar pleural fluid. Right lung clear. Changes of COPD/emphysema. Normal   heart size and pulmonary vascularity. Atherosclerotic vascular calcification.   Blood gas venous     Status: Abnormal    Collection Time: 01/05/21  4:48 AM   Result Value Ref Range    Ph Venous 7.25 (L) 7.32 - 7.43 pH    PCO2 Venous 83 (HH) 40 - 50 mm Hg    PO2 Venous 31 25 - 47 mm Hg    Bicarbonate Venous 36 (H) 21 - 28 mmol/L    Base Excess Venous 6.3 mmol/L    FIO2 CPAP 10    Lactic acid whole blood     Status: None    Collection Time: 01/05/21  4:48 AM   Result Value Ref Range    Lactic Acid 1.1 0.7 - 2.0 mmol/L   Blood gas venous     Status: Abnormal    Collection Time: 01/05/21  5:35 AM   Result Value Ref Range    Ph Venous 7.25 (L) 7.32 - 7.43 pH    PCO2 Venous 82 (HH) 40 - 50 mm Hg    PO2 Venous 39 25 - 47 mm Hg    Bicarbonate Venous 36 (H) 21 - 28 mmol/L    Base Excess Venous 5.9 mmol/L    FIO2 bipap 40%    Blood gas venous     Status: Abnormal    Collection Time: 01/05/21  6:36 AM   Result Value Ref Range    Ph Venous 7.26 (L) 7.32 - 7.43 pH    PCO2 Venous 76 (HH) 40 - 50 mm Hg    PO2 Venous 50 (H) 25 - 47 mm Hg    Bicarbonate Venous 34 (H) 21 - 28 mmol/L    Base Excess Venous 4.5 mmol/L    FIO2 16/6 40%        ED MEDICATIONS:   Medications   cefTRIAXone in d5w (ROCEPHIN) intermittent infusion 1 g (0 g Intravenous Stopped 1/5/21 0532)   azithromycin  (ZITHROMAX) 500 mg in sodium chloride 0.9 % 250 mL intermittent infusion (0 mg Intravenous Stopped 1/5/21 0639)   sodium chloride 0.9% infusion ( Intravenous New Bag 1/5/21 4716)   LORazepam (ATIVAN) injection 0.5 mg (0.5 mg Intravenous Given 1/5/21 8760)         Impression:    ICD-10-CM    1. Acute respiratory failure with hypoxia (H)  J96.01 Blood gas venous     Lactic acid whole blood     Lactic acid whole blood     Blood gas venous     Symptomatic Influenza A/B & SARS-CoV2 (COVID-19) Virus PCR Multiplex     Blood gas venous     CANCELED: Symptomatic Influenza A/B & SARS-CoV2 (COVID-19) Virus PCR Multiplex     CANCELED: Lactic acid whole blood   2. Pneumonia of left lower lobe due to infectious organism  J18.9    3. Hyponatremia  E87.1     acute on chronic       Plan:    Pending studies include serial blood gases with BiPAP, plan for probable admission here if he continues to improve.        MD Perico Greenberg, Norman Rodriguez MD  01/05/21 0728

## 2021-01-05 NOTE — ED NOTES
Dr. River notified pt failed Bipap trial. Pt is currently on Bipap. Dr. River told CN that pt is DNR/DNI. No current form on file and POLST was given to Dr. River to fill out and sign.

## 2021-01-05 NOTE — ED PROVIDER NOTES
History     Chief Complaint   Patient presents with     Shortness of Breath     HPI  Camacho Covington is a 60 year old male who who presents by EMS from home with acute respiratory distress.  History from the patient is that he suddenly developed shortness of breath at around 2 PM the day prior.  He has a history of COPD and is oxygen dependent at all times on 2 L per nasal cannula.  Over the past 12 to 24 hours he reports increasing shortness of breath .  He reports he smokes about half a pack to a pack per day.  Patient reports he lives at home with his family and has not had any recent exposure to COVID-19. EMS administered Solu-Medrol prior to ED arrival.  Patient was on 2 L at home increased to 3 L with hypoxia (88% on 3L). EMS noted patient had increased work of breathing and there was concern for subcostal retractions and respiratory fatigue EMS placed patient on CPAP because he did not tolerate BiPAP prior to arrival.Patient  arrived on 5mmHg of CPAP and was evaluated upon arrival due to acuity of presentation and concern for respiratory failure. Patient has a medical diagnosis of hyperlipidemia, history of pulmonary nodules and pulmonary emphysema and hypertension.  He is, 81 mg aspirin, multivitamins, lisinopril 20 mg/day, Combivent inhaler,        Allergies:  No Known Allergies    Problem List:    Patient Active Problem List    Diagnosis Date Noted     Pulmonary nodules 11/20/2020     Priority: Medium     CT March, 2019:IMPRESSION:  1.  Centrilobular emphysema.  2. Tiny bilateral noncalcified pulmonary nodules are present.  Follow-up chest CT in 12 months is suggested in reassessment.       Skin lesion 09/27/2019     Priority: Medium     Hyponatremia 02/17/2019     Priority: Medium     2/17/2019:His sodium remains low but improved some off the hydrochlorothiazide.  He has high urine sodium and urine osmolality so cause may be related to the hydrochlorothiazide but he may have SIADH.  PLAN: Decrease fluids  and recheck sodium in 2 weeks.  Stay off hydrochlorothiazide.        Pulmonary emphysema (H) 04/06/2018     Priority: Medium     PFT 4/19/2018 results:FVC=59%, FEV1=23%, FEV1/FVC=30%, DLOC=36%.  IMPRESSION:Severe Airflow Obstruction  -Emphysematous Type, Reversible Component.  Possible extrathoracic obstruction or poor inspiratory effort  PFT 10/16/2019 results:FVC=61%, FEV1=22%, FEV1/FVC=29%, DLOC=unable to do.    The FVC, FEV1 and FEV1/FVC ratio are reduced.  The inspiratory flow rates are reduced.  The FVC is reduced relative to the SVC indicating air trapping.  The TLC, RV, FRC and RV/TLC ratio are all increased indicating overinflation and air trapping.  Following administration of bronchodilators, there is a significant response indicated by the increased FVC.  Severe airway obstruction and overinflation are present.  A clinical trial of bronchodilators may be beneficial in view of the airway obstruction.  IMPRESSION:Severe Airflow Obstruction.   Unable to complete DLCO.  Possible extrathoraacic obstruction.          Essential hypertension with goal blood pressure less than 140/90 04/06/2018     Priority: Medium     Hyperlipidemia LDL goal <100 04/06/2018     Priority: Medium     Tobacco abuse 04/06/2018     Priority: Medium        Past Medical History:    No past medical history on file.    Past Surgical History:    No past surgical history on file.    Family History:    Family History   Problem Relation Age of Onset     Other Cancer Father         lung     Cerebrovascular Disease Father        Social History:  Marital Status:   [2]  Social History     Tobacco Use     Smoking status: Current Every Day Smoker     Packs/day: 0.50     Years: 44.00     Pack years: 22.00     Types: Cigarettes     Smokeless tobacco: Never Used     Tobacco comment: started at age 13,  3-4 cigs daily   Substance Use Topics     Alcohol use: No     Drug use: No        Medications:         amLODIPine (NORVASC) 5 MG tablet        aspirin 81 MG tablet       atorvastatin (LIPITOR) 20 MG tablet       ibuprofen 200 MG capsule       ipratropium-albuterol (COMBIVENT RESPIMAT)  MCG/ACT inhaler       lisinopril (ZESTRIL) 20 MG tablet       mometasone-formoterol (DULERA) 200-5 MCG/ACT inhaler       multivitamin, therapeutic with minerals (MULTI-VITAMIN) TABS tablet       order for DME       albuterol (VENTOLIN HFA) 108 (90 Base) MCG/ACT inhaler       nicotine (NICODERM CQ) 21 MG/24HR 24 hr patch       varenicline (CHANTIX) 1 MG tablet          Review of Systems   Constitutional: Negative.    HENT: Negative.    Eyes: Negative.    Respiratory: Positive for cough and shortness of breath.    Cardiovascular: Negative.    Gastrointestinal: Negative.    Endocrine: Negative.    Genitourinary: Negative.    Musculoskeletal: Negative.    Skin: Negative.    Allergic/Immunologic: Negative.    Neurological: Negative.    Hematological: Negative.    Psychiatric/Behavioral: Negative.    All other systems reviewed and are negative.      Physical Exam   BP: 132/64  Pulse: 94  Temp: 97.7  F (36.5  C)  Resp: 22  Weight: 60.7 kg (133 lb 12.8 oz)  SpO2: 93 %      Physical Exam  Constitutional:       General: He is in acute distress.      Appearance: He is ill-appearing and toxic-appearing.      Interventions: He is not intubated.  HENT:      Head: Normocephalic and atraumatic.   Eyes:      Pupils: Pupils are equal, round, and reactive to light.   Neck:      Musculoskeletal: Normal range of motion and neck supple.   Cardiovascular:      Rate and Rhythm: Normal rate and regular rhythm.   Pulmonary:      Effort: Tachypnea, accessory muscle usage and respiratory distress present. No bradypnea. He is not intubated.      Breath sounds: No stridor. Examination of the right-lower field reveals decreased breath sounds. Examination of the left-lower field reveals decreased breath sounds. Decreased breath sounds present.   Chest:      Chest wall: No mass, deformity, tenderness,  crepitus or edema. There is no dullness to percussion.   Skin:     Capillary Refill: Capillary refill takes less than 2 seconds.      Coloration: Skin is not cyanotic or pale.      Findings: No ecchymosis, erythema or rash.      Nails: There is no clubbing.     Neurological:      General: No focal deficit present.      Mental Status: He is alert and oriented to person, place, and time.   Psychiatric:         Mood and Affect: Mood normal.         Behavior: Behavior normal.         ED Course        Procedures               EKG Interpretation:          Critical Care time:  was 90 minutes for this patient excluding procedures.                ED medications:  Medications   cefTRIAXone in d5w (ROCEPHIN) intermittent infusion 1 g (0 g Intravenous Stopped 1/5/21 0532)   azithromycin (ZITHROMAX) 500 mg in sodium chloride 0.9 % 250 mL intermittent infusion (0 mg Intravenous Stopped 1/5/21 0639)   sodium chloride 0.9% infusion ( Intravenous New Bag 1/5/21 0529)   LORazepam (ATIVAN) injection 0.5 mg (0.5 mg Intravenous Given 1/5/21 0340)       ED Vitals:  Vitals:    01/05/21 0715 01/05/21 0730 01/05/21 0745 01/05/21 0800   BP: 122/53 125/65 124/61 (!) 140/62   Pulse: 84 85 86 82   Resp: 22 26 21 22   Temp:       TempSrc:       SpO2: 96% 96% 97% 94%   Weight:         Vitals:    01/05/21 0715 01/05/21 0730 01/05/21 0745 01/05/21 0800   BP: 122/53 125/65 124/61 (!) 140/62   Pulse: 84 85 86 82   Resp: 22 26 21 22   Temp:       TempSrc:       SpO2: 96% 96% 97% 94%   Weight:         Vitals:    01/05/21 0715 01/05/21 0730 01/05/21 0745 01/05/21 0800   BP: 122/53 125/65 124/61 (!) 140/62   Pulse: 84 85 86 82   Resp: 22 26 21 22   Temp:       TempSrc:       SpO2: 96% 96% 97% 94%   Weight:             ED labs and imaging:  Results for orders placed or performed during the hospital encounter of 01/05/21   XR Chest Port 1 View     Status: None    Narrative    EXAM: XR CHEST PORT 1 VW  LOCATION: [facility]  DATE/TIME: 1/5/2021  4:08 AM    INDICATION: Acute respiratory distress with history of COPD.  COMPARISON: 04/06/2018, CT 03/18/2019      Impression    IMPRESSION: New area of increased opacity involving left lung base relating to infiltrate associated with pneumonia. Continued radiographic follow-up recommended. Minimal left basilar pleural fluid. Right lung clear. Changes of COPD/emphysema. Normal   heart size and pulmonary vascularity. Atherosclerotic vascular calcification.   CBC with platelets differential     Status: Abnormal   Result Value Ref Range    WBC 19.9 (H) 4.0 - 11.0 10e9/L    RBC Count 4.15 (L) 4.4 - 5.9 10e12/L    Hemoglobin 13.4 13.3 - 17.7 g/dL    Hematocrit 39.2 (L) 40.0 - 53.0 %    MCV 95 78 - 100 fl    MCH 32.3 26.5 - 33.0 pg    MCHC 34.2 31.5 - 36.5 g/dL    RDW 12.1 10.0 - 15.0 %    Platelet Count 279 150 - 450 10e9/L    Diff Method Automated Method     % Neutrophils 90.7 %    % Lymphocytes 2.9 %    % Monocytes 5.9 %    % Eosinophils 0.0 %    % Basophils 0.2 %    % Immature Granulocytes 0.3 %    Nucleated RBCs 0 0 /100    Absolute Neutrophil 18.1 (H) 1.6 - 8.3 10e9/L    Absolute Lymphocytes 0.6 (L) 0.8 - 5.3 10e9/L    Absolute Monocytes 1.2 0.0 - 1.3 10e9/L    Absolute Eosinophils 0.0 0.0 - 0.7 10e9/L    Absolute Basophils 0.0 0.0 - 0.2 10e9/L    Abs Immature Granulocytes 0.1 0 - 0.4 10e9/L    Absolute Nucleated RBC 0.0    Comprehensive metabolic panel     Status: Abnormal   Result Value Ref Range    Sodium 123 (L) 133 - 144 mmol/L    Potassium 5.0 3.4 - 5.3 mmol/L    Chloride 88 (L) 94 - 109 mmol/L    Carbon Dioxide 34 (H) 20 - 32 mmol/L    Anion Gap 1 (L) 3 - 14 mmol/L    Glucose 157 (H) 70 - 99 mg/dL    Urea Nitrogen 15 7 - 30 mg/dL    Creatinine 0.34 (L) 0.66 - 1.25 mg/dL    GFR Estimate >90 >60 mL/min/[1.73_m2]    GFR Estimate If Black >90 >60 mL/min/[1.73_m2]    Calcium 8.6 8.5 - 10.1 mg/dL    Bilirubin Total 0.6 0.2 - 1.3 mg/dL    Albumin 3.3 (L) 3.4 - 5.0 g/dL    Protein Total 6.9 6.8 - 8.8 g/dL     Alkaline Phosphatase 70 40 - 150 U/L    ALT 20 0 - 70 U/L    AST 12 0 - 45 U/L   D dimer quantitative     Status: None   Result Value Ref Range    D Dimer 0.3 0.0 - 0.50 ug/ml FEU   Troponin I     Status: None   Result Value Ref Range    Troponin I ES <0.015 0.000 - 0.045 ug/L   Blood gas venous     Status: Abnormal   Result Value Ref Range    Ph Venous 7.30 (L) 7.32 - 7.43 pH    PCO2 Venous 75 (H) 40 - 50 mm Hg    PO2 Venous 54 (H) 25 - 47 mm Hg    Bicarbonate Venous 37 (H) 21 - 28 mmol/L    Base Excess Venous 7.4 mmol/L   Blood gas venous     Status: Abnormal   Result Value Ref Range    Ph Venous 7.25 (L) 7.32 - 7.43 pH    PCO2 Venous 83 (HH) 40 - 50 mm Hg    PO2 Venous 31 25 - 47 mm Hg    Bicarbonate Venous 36 (H) 21 - 28 mmol/L    Base Excess Venous 6.3 mmol/L    FIO2 CPAP 10    Lactic acid whole blood     Status: None   Result Value Ref Range    Lactic Acid 1.1 0.7 - 2.0 mmol/L   Blood gas venous     Status: Abnormal   Result Value Ref Range    Ph Venous 7.25 (L) 7.32 - 7.43 pH    PCO2 Venous 82 (HH) 40 - 50 mm Hg    PO2 Venous 39 25 - 47 mm Hg    Bicarbonate Venous 36 (H) 21 - 28 mmol/L    Base Excess Venous 5.9 mmol/L    FIO2 bipap 40%    Symptomatic Influenza A/B & SARS-CoV2 (COVID-19) Virus PCR Multiplex     Status: None    Specimen: Nasopharyngeal   Result Value Ref Range    Flu A/B & SARS-COV-2 PCR Source Canceled, Test credited     SARS-CoV-2 PCR Result Canceled, Test credited     Influenza A PCR Canceled, Test credited NEG^Negative    Influenza B PCR Canceled, Test credited NEG^Negative    Respiratory Syncytial Virus PCR Canceled, Test credited NEG^Negative    Flu A/B & SARS-CoV-2 PCR Comment Canceled, Test credited    Blood gas venous     Status: Abnormal   Result Value Ref Range    Ph Venous 7.26 (L) 7.32 - 7.43 pH    PCO2 Venous 76 (HH) 40 - 50 mm Hg    PO2 Venous 50 (H) 25 - 47 mm Hg    Bicarbonate Venous 34 (H) 21 - 28 mmol/L    Base Excess Venous 4.5 mmol/L    FIO2 16/6 40%    Blood gas venous      Status: Abnormal   Result Value Ref Range    Ph Venous 7.30 (L) 7.32 - 7.43 pH    PCO2 Venous 70 (H) 40 - 50 mm Hg    PO2 Venous 64 (H) 25 - 47 mm Hg    Bicarbonate Venous 34 (H) 21 - 28 mmol/L    Base Excess Venous 5.3 mmol/L    FIO2 40    Influenza A/B antigen     Status: None   Result Value Ref Range    Influenza A/B Agn Specimen Nasal     Influenza A Negative NEG^Negative    Influenza B Negative NEG^Negative       Assessments & Plan (with Medical Decision Making)   Assessment Summary and Clinical Impression: 60-year-old male who presented by EMS from home with concern for respiratory distress with underlying history of oxygen dependent COPD.  Patient has acute hypoxic respiratory failure-due to left lower lobe pneumonia and COPD.  At shift end patient was being given a trial of respiratory support with BiPAP in hopes that this would improve his acid-base status in hopes that he could be admitted to medicine for further care for pneumonia. He arrived with symptoms within the last 24 hours stating that he typically is on 2 L of oxygen at all times but developed increasing work of breathing within the last 24 hours.  He reported he had not had any exposures to COVID-19.  He also has not been recently tested for COVID-19.  EMS noted patient had increased work of breathing despite increasing his oxygen support by nasal cannula.  He did not tolerate BiPAP in transport and was placed on CPAP and arrived in modest respiratory distress.  He was 88% on 3 L nasal cannula and arrived 92 to 93% on 5 mmHg of CPAP.  Prior to hand off at shift end patient was on BIPAP- (16/6, FIO2-40%) with some slight improvement in respiratory acidosis with plan to trial BIPAP without endotracheal intubation as patient was confirmed to be DNR/DNI.    ED course and Plan:  Reviewed the medical record.  Patient was seen immediately upon arrival due to acuity of presentation with concern for respiratory distress and impending respiratory failure.   He continued was transition to BIPAP from CPAP during his ED course and a broad differential was considered including COPD exacerbation, COVID-19 pneumonia, pulmonary embolism, acute heart failure syndrome.  Patient received Solu-Medrol prehospital.  Blood cultures were obtained portable chest imaging revealed concern for  pneumonia.  Patient arrived with respiratory acidosis, with leukocytosis of 19.9, normal D-dimer, and a negative troponin. Due to respiratory acidosis patient was switched from CPAP to BiPAP to help with better ventilation, as patient was noted to have progression somnolence likely multifactorial from gentle dose of lorazepam for anxiolysis and to aid tolerance of BIPAP and progressive hypercapnia.    I spoke with Dr Jain at 5.25am the admitting hospitalist at  [facility] who was willing to accept the patient for transfer as long as his respiratory status did not worsen and I did not have any evidence that would suggest hemodynamic instability with progressive hypotension- with a transient episode of hypotension.  Dr Jain and I ultimately agreed to trial on-going care at [facility] and re-evaluation about transfer if patient remained clinically stable and no beds became available later in the day.    I updated family- ( and spoke with daughter-Emelyn Peng- at [phone])- who confirmed that patient is DNR/DNI after reviewing concern about his respiratory acidosis that was not initially improving with BiPAP support including risk and benefit of mechanical ventilation.     At shift end patient is awaiting recheck on his respiratory acidosis in hopes that he would have some improvement while on BiPAP and that he can be transitioned to support with either Oxymizer mask or nasal cannula and he can be admitted locally here at [facility].  Patient has no interval improvement family expressed understanding that this could result in death and they were in agreement the patient is DNR/DNI.  Central venous access if medically indicated.    At shift end patient is signed out to Dr DORIS River at 7am in hopes that patient could be admitted to [facility] for further support for pneumonia resulting in acute hypoxic respiratory failure with underlying history of COPD, with COVID test pending.  If patient has worsening respiratory status he will likely need to be admitted for comfort care measures.      Disclaimer: This note consists of symbols derived from keyboarding, dictation and/or voice recognition software. As a result, there may be errors in the script that have gone undetected. Please consider this when interpreting information found in this chart.  I have reviewed the nursing notes.    I have reviewed the findings, diagnosis, plan and need for follow up with the patient.       New Prescriptions    No medications on file       Final diagnoses:   Acute respiratory failure with hypoxia (H)   Pneumonia of left lower lobe due to infectious organism   Hyponatremia - acute on chronic       1/5/2021   [facility] EMERGENCY DEPT     Mal Francis MD  01/05/21 6114

## 2021-01-05 NOTE — ED NOTES
8:59 AM  Patient's daughter Trang updated with respect to his current status.  I explained that we would not likely have a bed here for the patient today and I am actively looking for a bed to transfer him for admission of the facility that we will have BiPAP admission capability.  She is comfortable with this plan.  She reinforced once again that the patient, her and all family members are in agreement with the plan for DNR/DNI.  I will call and give her another update once I have a bed confirmation for admission, or if there is a change in his status.     Norman River MD  01/05/21 0900

## 2021-01-05 NOTE — PHARMACY-ADMISSION MEDICATION HISTORY
"Pharmacy Medication History  Admission medication history interview status for the 1/5/2021  admission is complete. See EPIC admission navigator for prior to admission medications       Medication history sources: Patient, Care Everywhere and [facility] clarified meds for Patient.  Location of interview: Phone  Adherence Assessment: Good    Significant changes made to the medication list:  D/C'd Chantix      Additional medication history information:   none    Medication reconciliation completed by provider prior to medication history? Yes    Time spent in this activity: 25\"      Prior to Admission medications    Medication Sig Last Dose Taking? Auth Provider   amLODIPine (NORVASC) 5 MG tablet TAKE ONE TABLET BY MOUTH ONCE DAILY  Patient taking differently: Take 5 mg by mouth every morning  1/4/2021 at am Yes Theo Ponce MD   aspirin 81 MG tablet Take 81 mg by mouth every morning  1/4/2021 at am Yes Theo Ponce MD   atorvastatin (LIPITOR) 20 MG tablet Take 1 tablet (20 mg) by mouth daily  Patient taking differently: Take 20 mg by mouth every morning  1/4/2021 at am Yes Theo Ponce MD   ipratropium-albuterol (COMBIVENT RESPIMAT)  MCG/ACT inhaler INHALE ONE PUFF BY MOUTH FOUR TIMES A DAY DUE FOR PHYSICAL  Patient taking differently: Inhale 1 puff into the lungs 4 times daily INHALE ONE PUFF BY MOUTH FOUR TIMES A DAY DUE FOR PHYSICAL 1/4/2021 at am Theo Bui MD   lisinopril (ZESTRIL) 20 MG tablet Take 1 tablet (20 mg) by mouth daily  Patient taking differently: Take 20 mg by mouth every morning  1/4/2021 at am Theo Biu MD   mometasone-formoterol (DULERA) 200-5 MCG/ACT inhaler Inhale 2 puffs into the lungs 2 times daily 1/4/2021 at am Theo Bui MD   multivitamin, therapeutic with minerals (MULTI-VITAMIN) TABS tablet Take 1 tablet by mouth every morning  1/4/2021 at am Yes Theo Ponce MD   albuterol (VENTOLIN HFA) 108 (90 Base) MCG/ACT inhaler INHALE 2 " PUFFS INTO THE LUNGS EVERY 4 HOURS AS NEEDED FOR SHORTNESS OF BREATH, DIFFICULTY BREATHING OR WHEEZING. prn  Theo Ponce MD   ibuprofen 200 MG capsule Take 400 mg by mouth every 4 hours as needed for fever  prn  Theo Ponce MD   nicotine (NICODERM CQ) 21 MG/24HR 24 hr patch Place 1 patch onto the skin every 24 hours Place 21mg nicotine patches once daily for 6-8 weeks, then 14mg patches once daily for 2 weeks and finally 7mg patches once daily for 2 weeks. prn  Theo Ponce MD   order for DME Equipment being ordered: Oxygen with portability. 2 liters per nasal cannula continuous.   Theo Ponce MD       The information provided in this note is only as accurate as the sources available at the time of the update(s).

## 2021-01-05 NOTE — PROVIDER NOTIFICATION
PAIGE Acevedo PA-C @ 17:49 on 1/5/20--Start PTA meds tomorrow in the a.m. Antoinette Lewis [facility]

## 2021-01-05 NOTE — ED NOTES
"Pt becoming more responsive. Opens eyes spontaneously. Pt assisted to use urinal in bed. Pt asking \"when can I get up now\". Pt assured once breathing more stable he would be able to get up.   "

## 2021-01-05 NOTE — ED TRIAGE NOTES
"Pt presents via EMS with c/o SOB, worsened over past 12 hours. Pt has hx of COPD and is on home 02 at 2LPM. Pt attempted to \"ride it out\" and increased 02 today to 3LPM. Upon EMS arrival patient with sats 88% on 3LPM with increased WOB using accessory muscles in chest and abdomen. Pt was placed on CPAP by EMS.     Upon arrival to ED, patient placed on CPAP by RT at 10. Pt c/o \"too much air\" and was given IV ativan per [facility]OR[facility] by Dr Francis. Pt currently is breathing more comfortably and tolerating CPAP.   "
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
## 2021-01-05 NOTE — ED NOTES
DATE:  1/5/2021   TIME OF RECEIPT FROM LAB:  0506  LAB TEST:  PCO2  LAB VALUE:  83  RESULTS GIVEN WITH READ-BACK TO (PROVIDER):  Mal Francis, *  TIME LAB VALUE REPORTED TO PROVIDER:   0507

## 2021-01-05 NOTE — PROGRESS NOTES
Reassessed patient as he pulled all BiPAP masking off. Took the heppa filter out of line, we are using proper PPE and am in a negative pressure room. This allowed for his work of breathing to decrease. Current parameters 14/5 and 35%. RR still 30, asked RN about any anxiety medications that could be given at this time. Continue to monitor.

## 2021-01-05 NOTE — ED NOTES
Respiratory in room and trialed pt on bypap to see if he could trip the bypap on his own and pt failed.

## 2021-01-05 NOTE — ED NOTES
9:57 AM  I reviewed the patient with on-call hospitalist Dr. Sousa at [facility].  I have got a planned repeat gas is in lab right now from 930 and at his request added a repeat sodium.  He also requested that we broaden the patient's antibiotic coverage specifically asked for Zosyn and Vanco which have been ordered.  Comfortable excepting the patient to [facility] bed on BiPAP.  I will call and update the patient's daughter with respect to the disposition at this point.       Norman River MD  01/05/21 [id number]

## 2021-01-05 NOTE — ED NOTES
Pt leaving per [facility] EMS to [facility]. Update given to nurse at [facility].

## 2021-01-06 NOTE — PROGRESS NOTES
[facility]    Medicine Progress Note - Hospitalist Service       Date of Admission:  1/5/2021  Assessment & Plan         Camacho Covington is a 60 year old male w/ PMH of O2 dependent emphysematous COPD, active 1.5ppd smoker, HLD, HTN and pulmonary nodules admitted on 01/05/21 with acute on chronic hypoxic respiratory failure.      Sepsis due to left lower lobe pneumonia   Hypoxic acute respiratory failure due to the above   Acute exacerbation of chronic obstructive lung disease   History of emphysema and chronic respiratory failure on home oxygen   Ongoing tobacco use   COVID-19 PCR is negative.  He is improving with BiLevel positive airway pressure, oxygen and antibiotics.  Currently on 6L oxygen via Oxymizer.  Off of BiLevel positive airway pressure this morning.  Feeling a little better.     Continue supplemental oxygen, antibiotics, inhaled bronchodilators     Change steroids to oral     Continue nicotine      Hyponatremia, acute on chronic baseline 122-131  Likely SIADH  Na 126 this morning     Continue to monitor      Hyperlipidemia   Hypertension     Continue PTA amlodipine, aspirin, Lipitor, lisinopril     Diet: Clear Liquid Diet    DVT Prophylaxis: Enoxaparin (Lovenox) SQ  Smith Catheter: not present  Code Status: No CPR- Do NOT Intubate      Disposition Plan   Expected discharge: 2 - 3 days, recommended to prior living arrangement once resp status is stable.  Entered: Sj Ochoa MD 01/06/2021, 9:06 AM       The patient's care was discussed with the Patient.    Sj Ochoa MD  Hospitalist Service  [facility]  Contact information available via [facility] Paging/Directory    ______________________________________________________________________    Interval History   Feeling better- just taken off of BiLevel positive airway pressure.    Data reviewed today: I reviewed all medications, new labs and imaging results over the last 24 hours. I  personally reviewed no images or EKG's today.    Physical Exam   Vital Signs: Temp: 98.1  F (36.7  C) Temp src: Axillary BP: 121/67 Pulse: 81   Resp: 22 SpO2: 98 % O2 Device: Oxymizer cannula Oxygen Delivery: 6 LPM  Weight: 0 lbs 0 oz  Constitutional: awake, alert, cooperative, no apparent distress  Respiratory: No increased work of breathing, no wheezing, rales or rhonchi   Cardiovascular:  regular rate and rhythm, normal S1 and S2, no S3 or S4, and no murmur noted  GI: normal bowel sounds, soft, non-distended, non-tender  Neuropsychiatric: General: normal, calm and normal eye contact    Data   Recent Labs   Lab 01/06/21  0646 01/05/21  1449 01/05/21  0949 01/05/21  0340   WBC 17.7*  --   --  19.9*   HGB 11.3*  --   --  13.4   MCV 96  --   --  95     --   --  279   *  --  127* 123*   POTASSIUM 5.3 4.8 4.8 5.0   CHLORIDE 91*  --  92* 88*   CO2 36*  --  35* 34*   BUN 19  --  16 15   CR 0.47* 0.35* 0.35* 0.34*   ANIONGAP <1*  --  <1* 1*   JUANA 9.0  --  8.3* 8.6   *  --  135* 157*   ALBUMIN  --   --   --  3.3*   PROTTOTAL  --   --   --  6.9   BILITOTAL  --   --   --  0.6   ALKPHOS  --   --   --  70   ALT  --   --   --  20   AST  --   --   --  12   TROPI  --  <0.015  --  <0.015     No results found for this or any previous visit (from the past 24 hour(s)).  Medications     - MEDICATION INSTRUCTIONS -       - MEDICATION INSTRUCTIONS -         amLODIPine  5 mg Oral Daily     aspirin  81 mg Oral QAM     atorvastatin  20 mg Oral QAM     azithromycin  500 mg Intravenous Q24H     cefTRIAXone  1 g Intravenous Q24H     enoxaparin ANTICOAGULANT  40 mg Subcutaneous Q24H     ipratropium - albuterol 0.5 mg/2.5 mg/3 mL  3 mL Nebulization 4x daily     lisinopril  20 mg Oral QAM     methylPREDNISolone  62.5 mg Intravenous Q6H     nicotine  1 patch Transdermal Daily     nicotine   Transdermal Q8H     senna-docusate  1 tablet Oral BID    Or     senna-docusate  2 tablet Oral BID     sodium chloride (PF)  3 mL  Intracatheter Q8H     sodium chloride (PF)  3 mL Intracatheter Q8H

## 2021-01-06 NOTE — PLAN OF CARE
IMC Status Continued. Vital Signs stable, ex oxygen use. Pt tolerated the oximyzer cannula (4L) for 2 hours then needed to be back on the BiPAP for work of breathing. Telemetry normal sinus rhythm. Alert and Oriented. Lung sounds diminished throughout. Bowel sounds active and audible. Passing flatus. Clear liquid diet, while not on BiPAP. Denies nausea. Adequate urinary output. CMS intact ex bilateral lower extremities lay, dry and flaky, pulses palpable. Up with assist of 1-2. Pt has denied pain.

## 2021-01-06 NOTE — PROGRESS NOTES
IMC discontinued. Pt was weaned of Bipap, now on 4L via oxymizer and sating well. Tolerating diet. Will continue to monitor.

## 2021-01-06 NOTE — PLAN OF CARE
A&OX4, VSS.  Oxygen sats  90-95% 4 L oxymizer cannula.  SOB with labored breathing with eating/talking/activity.  IS to 500.  Lungs diminished.  Denies pain.  Pt declining to get OOB this shift.  Repositions self in bed to prevent bed sores.  Educations on importance of repositioning weight.

## 2021-01-06 NOTE — PLAN OF CARE
[facility] Status    Summary: Admitted  On 01/05/2021 from [facility] ED for He has pneumonia and acute hypoxic respiratory failure.  Date/Time: 9:47 PM January 5, 2021  Cognitive Concerns/Orientation: A&Ox4  ABNL VS/02: On BiPaP- baseline 2/3 L NC   Resp: LS Clear, diminished bilaterally at times appears to be using accessory muscles.  GI: Flat, BS+, Flatus+ no BM this shift     Mobility: A-1/2  Pain Management: Denies  Diet: Clear liquid diet, while not on BiPAP  Bowel/Bladder: Incontinent of urine at times, uses urinal at the bedside, continent of bowel.  ABNL Labs/BG: Abnormal ABG's, Na+ 127, Creatinine 0.35  Drains/Devices: x2 PIV one in each arm-both SL and intermittent IV antibiotics.   Telemetry Rhythm: NSR, HR in the 80's  Skin: Scattered bruising,   CMS: BLE's lay and red in color. +2/+3 edema, dry and flaky.  Test Procedures: UA -  Discharge day/Goals/Place: Pending pt progress  Other Important Info: His appearance is ill-appearing and cachexia  History: Hyperlipidemia, history of pulmonary nodules and pulmonary emphysema and hypertensionCOPD and is oxygen dependent, Tobacco abuse-.half a pack a day for 44 years.

## 2021-01-07 NOTE — PLAN OF CARE
AVSS. Weaned to 3L and sating >95%. Pulmonary toilet encouraged. Activity encouraged. Will continue to monitor.

## 2021-01-07 NOTE — PLAN OF CARE
A/Ox4. VSS on 4L oxymizer. BS active,+flatus, +bm. Voiding to urinal. LS diminished. Skin:intact.  Pain:denies. Up with assist of 1-2. Diet: regular, tolerating. Plan: continue to monitor.

## 2021-01-07 NOTE — PROGRESS NOTES
[facility]    Medicine Progress Note - Hospitalist Service       Date of Admission:  1/5/2021  Assessment & Plan           Camacho Covington is a 60 year old male w/ PMH of O2 dependent emphysematous COPD, active 1.5ppd smoker, HLD, HTN and pulmonary nodules admitted on 01/05/21 with acute on chronic hypoxic respiratory failure.      Sepsis due to left lower lobe pneumonia   Hypoxic acute respiratory failure due to the above   Acute exacerbation of chronic obstructive lung disease   History of emphysema and chronic respiratory failure on home oxygen   Ongoing tobacco use   COVID-19 PCR is negative.  He is improving with BiLevel positive airway pressure, oxygen and antibiotics.  Currently on 4L oxygen via Oxymizer.  Off of BiLevel positive airway pressure. Feeling better. Pro-calcitonin down from 2 to 1.    Continue supplemental oxygen, antibiotics, inhaled bronchodilators, prednisone    Continue nicotine      Hyponatremia, acute on chronic baseline 122-131  Likely SIADH  Na up to 131  this morning     Continue to monitor      Hyperlipidemia   Hypertension     Continue PTA amlodipine, aspirin, Lipitor, lisinopril     Diet: Regular Diet Adult    DVT Prophylaxis: Enoxaparin (Lovenox) SQ  Smith Catheter: not present  Code Status: No CPR- Do NOT Intubate      Disposition Plan   Expected discharge: 1-2 days, recommended to prior living arrangement once resp status is stable. Will get physcial therapy consult.  Entered: Sj Ochoa MD 01/07/2021, 11:51 AM       The patient's care was discussed with the Patient.    Sj Ochoa MD  Hospitalist Service  [facility]  Contact information available via [facility] Paging/Directory    ______________________________________________________________________    Interval History   No new complaints and he is feeling better overall.  Somewhat weak.      Data reviewed today: I reviewed all medications, new labs and imaging  results over the last 24 hours. I personally reviewed no images or EKG's today.    Physical Exam   Vital Signs: Temp: 97.7  F (36.5  C) Temp src: Axillary BP: 126/63 Pulse: 85   Resp: 20 SpO2: 95 % O2 Device: Oxymask Oxygen Delivery: 3 LPM  Weight: 0 lbs 0 oz  Constitutional: awake, alert, cooperative, no apparent distress  Respiratory: No increased work of breathing, no wheezing, rales or rhonchi   Cardiovascular:  regular rate and rhythm, normal S1 and S2, no S3 or S4, and no murmur noted  GI: normal bowel sounds, soft, non-distended, non-tender  Neuropsychiatric: General: normal, calm and normal eye contact    Data   Recent Labs   Lab 21  0703 21  0646 21  1449 21  0949 21  0340   WBC 14.9* 17.7*  --   --  19.9*   HGB 11.1* 11.3*  --   --  13.4   MCV 96 96  --   --  95    250  --   --  279   * 126*  --  127* 123*   POTASSIUM 5.4* 5.3 4.8 4.8 5.0   CHLORIDE 94 91*  --  92* 88*   CO2 41* 36*  --  35* 34*   BUN 18 19  --  16 15   CR 0.42* 0.47* 0.35* 0.35* 0.34*   ANIONGAP <1* <1*  --  <1* 1*   JUANA 9.0 9.0  --  8.3* 8.6   * 118*  --  135* 157*   ALBUMIN  --   --   --   --  3.3*   PROTTOTAL  --   --   --   --  6.9   BILITOTAL  --   --   --   --  0.6   ALKPHOS  --   --   --   --  70   ALT  --   --   --   --  20   AST  --   --   --   --  12   TROPI  --   --  <0.015  --  <0.015     Recent Results (from the past 24 hour(s))   Echocardiogram Complete    Narrative    [id number]  [id number]  [id number]  [id number]^FLO^VILMA^STEFANY           [facility]  Echocardiography Laboratory  [address] [place] [address]        Name: LENCHO MORALES  MRN: [id number]  : 1960  Study Date: 2021 01:11 PM  Age: 60 yrs  Gender: Male  Patient Location: [facility]  Reason For Study: SOB  Ordering Physician: VILMA ROSSI  Referring Physician: KELLEY FERGUSON  Performed By: Sierra Rudd     BSA: 1.7 m2  Height: 67 in  Weight: 133 lb  HR: 86  BP: 121/67  mmHg  _____________________________________________________________________________  __        Procedure  Complete Portable Echo Adult.  _____________________________________________________________________________  __        Interpretation Summary     The visual ejection fraction is estimated at 55-60%.  Left ventricular diastolic function is normal.  There is mild biatrial enlargement.  Right ventricular systolic pressure could not be approximated due to  inadequate tricuspid regurgitation.  The inferior vena cava is normal.  Sinus rhythm was noted.  There is no comparison study available.  _____________________________________________________________________________  __        Left Ventricle  The left ventricle is normal in size. There is normal left ventricular wall  thickness. The visual ejection fraction is estimated at 55-60%. Left  ventricular diastolic function is normal. Normal left ventricular wall motion.     Right Ventricle  The right ventricle is normal in structure, function and size.     Atria  There is mild biatrial enlargement. Chiari network (normal variant) is noted.  Intact atrial septum.     Mitral Valve  The mitral valve leaflets appear thickened, but open well. There is trace  mitral regurgitation.        Tricuspid Valve  The tricuspid valve is normal in structure and function. There is trace  tricuspid regurgitation. Right ventricular systolic pressure could not be  approximated due to inadequate tricuspid regurgitation.     Aortic Valve  The aortic valve is trileaflet with aortic valve sclerosis.     Pulmonic Valve  Normal pulmonic valve.     Vessels  Normal size aorta. Normal size ascending aorta. The inferior vena cava is  normal.     Pericardium  The pericardium appears normal.        Rhythm  Sinus rhythm was noted.  _____________________________________________________________________________  __  MMode/2D Measurements & Calculations  IVSd: 1.1 cm     LVIDd: 4.4 cm  LVIDs: 3.1  cm  LVPWd: 1.1 cm  FS: 29.5 %  LV mass(C)d: 164.3 grams  LV mass(C)dI: 96.6 grams/m2  Ao root diam: 3.6 cm  LA dimension: 4.1 cm  asc Aorta Diam: 3.2 cm  LA/Ao: 1.1  LVOT diam: 2.3 cm  LVOT area: 4.1 cm2  LA Volume (BP): 64.1 ml  LA Volume Index (BP): 37.7 ml/m2  RWT: 0.50           Doppler Measurements & Calculations  MV E max aviva: 80.5 cm/sec  MV A max aviva: 69.4 cm/sec  MV E/A: 1.2  MV dec time: 0.18 sec  PA V2 max: 115.5 cm/sec  PA max P.3 mmHg  PA acc time: 0.12 sec  E/E' av.1  Lateral E/e': 5.1  Medial E/e': 7.1           _____________________________________________________________________________  __           Report approved by: Freddy Martinez 2021 02:31 PM        Medications       amLODIPine  5 mg Oral Daily     aspirin  81 mg Oral QAM     atorvastatin  20 mg Oral QAM     azithromycin  500 mg Intravenous Q24H     cefTRIAXone  1 g Intravenous Q24H     enoxaparin ANTICOAGULANT  40 mg Subcutaneous Q24H     guaiFENesin  600 mg Oral BID     ipratropium - albuterol 0.5 mg/2.5 mg/3 mL  3 mL Nebulization 4x daily     lisinopril  20 mg Oral QAM     nicotine  1 patch Transdermal Daily     nicotine   Transdermal Q8H     predniSONE  30 mg Oral BID w/meals     senna-docusate  1 tablet Oral BID    Or     senna-docusate  2 tablet Oral BID     sodium chloride (PF)  3 mL Intracatheter Q8H     sodium chloride (PF)  3 mL Intracatheter Q8H

## 2021-01-08 NOTE — PLAN OF CARE
A&O x4. VSS on 3 LPM oximyzer. CMS intact. Lungs diminished. BS+, BM-, flatus+. Skin intact. Tolerating reg diet. Denies N/V. Voiding adequately. Denies pain. Up w/1. Daughter updated by Dr. Ochoa.

## 2021-01-08 NOTE — PROGRESS NOTES
01/08/21 0810   Quick Adds   Type of Visit Initial PT Evaluation   Living Environment   People in home spouse   Current Living Arrangements mobile home   Home Accessibility stairs to enter home   Number of Stairs, Main Entrance 5  (3 +1+1)   Stair Railings, Main Entrance railing on left side (ascending)   Living Environment Comments bathroom includes tub shower with 1 grab bar and shower stool, standard toilet.   Self-Care   Usual Activity Tolerance moderate   Current Activity Tolerance fair   Equipment Currently Used at Home shower chair;grab bar, tub/shower   Activity/Exercise/Self-Care Comment Pt reports he can usually take ~ 10 steps before needing to rest before moving on (usually leans on wall to rest standing). Pt reports if he is coming & going from house her can get out of the car, up the steps and into the house before needing to stop & rest. Spouse does cooking/cleaning/laundry/errands. Pt does not drive and rarely leaves home & spouse is with him when he does leave home.   Disability/Function   Walking or Climbing Stairs Difficulty other (see comments)   Dressing/Bathing Difficulty yes  (limited endurance)   Dressing/Bathing bathing difficulty, requires equipment  (shower stool & grab bar)   Fall history within last six months no   General Information   Onset of Illness/Injury or Date of Surgery 01/05/21   Referring Physician Sj Ochoa MD   Patient/Family Therapy Goals Statement (PT) to go home   Pertinent History of Current Problem (include personal factors and/or comorbidities that impact the POC) Pt admitted 1/5 with sepsis due to L lower lobe pneumonia, acute on chronic hypercapnic respiratory failure, acute exacerbation of COPD. PMH significant for O2 dependent emphysematous COPD, active 1.5ppd smoker, HLD, HTN and pulmonary nodules.   Existing Precautions/Restrictions fall;oxygen therapy device and L/min  (5L O2 via oxymizer currently (uses 2.5L at home per pt))   Cognition    Orientation Status (Cognition) oriented x 4   Pain Assessment   Patient Currently in Pain No   Posture    Posture Forward head position;Protracted shoulders   Range of Motion (ROM)   ROM Comment ROM appears grossly WFL. Pt able to don socks while seated at EOB   Strength   Strength Comments Pt relies on UE pushoff to stand from standard chair with armrests but no physical assist needed   Bed Mobility   Comment (Bed Mobility) Supine>sit with HOB elevated ~30 degrees and S, pt benavidez not need assist but fatigues with this and needs a few min rest before further activity   Transfers   Transfer Safety Comments Sit>stand from EOB with no AD with CGA   Gait/Stairs (Locomotion)   Comment (Gait/Stairs) Ambulated in room without AD with CGA-Raysa with 2 LOB when turning needing Raysa to recover; fatigues quickly   Balance   Balance Comments Impaired dynamic balance, needs CGA-Raysa for safe dynamic mobility without AD   Sensory Examination   Sensory Perception Comments denies numbness/tingling   Clinical Impression   Criteria for Skilled Therapeutic Intervention yes, treatment indicated   PT Diagnosis (PT) impaired functional mobility   Influenced by the following impairments limited activity tolerance, respiratory status with increased O2 needs, decreased strength, impaired balance   Functional limitations due to impairments difficulty with bed mobility, transfers, ambulation, stairs   Clinical Presentation Evolving/Changing  (SpO2 needs close monitoring throughout)   Clinical Presentation Rationale clinical judgement, vitals assessment   Clinical Decision Making (Complexity) moderate complexity  (impaired at baseline & unclear how far off)   Therapy Frequency (PT) Daily   Predicted Duration of Therapy Intervention (days/wks) 4 days   Planned Therapy Interventions (PT) balance training;bed mobility training;gait training;home exercise program;patient/family education;neuromuscular re-education;stair  training;strengthening;transfer training   Anticipated Equipment Needs at Discharge (PT) walker, rolling   Risk & Benefits of therapy have been explained evaluation/treatment results reviewed;care plan/treatment goals reviewed;risks/benefits reviewed;current/potential barriers reviewed;participants included;patient   Clinical Impression Comments Patient with limited activity tolerance and balance impairments, pt will benefit from continued therapies while hospitalized to progress activity tolerance, safety and independence with mobility.   PT Discharge Planning    PT Discharge Recommendation (DC Rec) home with home care physical therapy;home with assist   PT Rationale for DC Rec Pt is below baseline, needing increased O2 level and Ax1 for mobility. Pt reports he would refuse TCU; therefore, recommend home PT, use of walker at all times, assist of spouse for stairs and any ambulation ouside home.    PT Brief overview of current status  Ambulated 2x20'; 1st with no AD and Raysa with LOB when turning, then with FWW and CGA with no LOB. Pt needing extended rest between, desatting to 86-87 & recovering quickly to 90 but needing prolonged rest to recover to 91-92.   Total Evaluation Time   Total Evaluation Time (Minutes) 10

## 2021-01-08 NOTE — PLAN OF CARE
A/Ox4. Pleasant and calm. Up on chair earlier this shift with A1 with walker.VSS on 3 LPM oxymizer cannula. CMS intact. Lungs diminished, IS to 750. Tolerating regular diet. Denies N/V. Voiding adequately and uses urinal. Denies pain. Up w/1-2, GB and walker.

## 2021-01-08 NOTE — PROVIDER NOTIFICATION
1044: Dr. Ochoa paged to set up time for care conference.    Re-paged as no response: Dtr still waiting for care conference and would like it to be while you are in room and she can be on iPad. Is there a time I can tell her would work for you?    1225: Got update from Dr. Ochoa who spoke with Dtr.

## 2021-01-08 NOTE — PROGRESS NOTES
[facility]    Medicine Progress Note - Hospitalist Service       Date of Admission:  1/5/2021  Assessment & Plan         Camacho Covington is a 60 year old male w/ PMH of O2 dependent emphysematous COPD, active 1.5ppd smoker, HLD, HTN and pulmonary nodules admitted on 01/05/21 with acute on chronic hypoxic respiratory failure.      Sepsis due to left lower lobe pneumonia   Hypoxic acute respiratory failure due to the above   Acute exacerbation of chronic obstructive lung disease   History of emphysema and chronic respiratory failure on home oxygen   Ongoing tobacco use   COVID-19 PCR is negative.  He is improving with BiLevel positive airway pressure, oxygen and antibiotics.  Currently on 4L oxygen via Oxymizer.  Off of BiLevel positive airway pressure. Feeling better. Pro-calcitonin down from 2 to 1. Currently on 3L oxygen which is his baseline.    Continue supplemental oxygen, antibiotics, inhaled bronchodilators, prednisone    Continue nicotine     Would keep him 1-2 more days in the hospital given severity of pneumonia     Hyponatremia, acute on chronic baseline 122-131  Likely SIADH  Na up to 134  this morning     Continue to monitor      Hyperlipidemia   Hypertension     Continue PTA amlodipine, aspirin, Lipitor, lisinopril    Deconditioning   Physcial therapy is recommending transitional care unit but he wants to go home with home physcial therapy.    Discharge home with physcial therapy when stable      Diet: Regular Diet Adult    DVT Prophylaxis: Enoxaparin (Lovenox) SQ  Smith Catheter: not present  Code Status: No CPR- Do NOT Intubate      Disposition Plan   Expected discharge: Sunday or Monday, recommended to prior living arrangement once resp status is stable.  Entered: Sj Ochoa MD 01/08/2021, 2:44 PM       The patient's care was discussed with the Patient and his daughter.    Sj Ochoa MD  Hospitalist Service  [facility]  Hospital  Contact information available via [facility] Paging/Directory    ______________________________________________________________________    Interval History   Breathing is close to baseline.  No new complaints.      Data reviewed today: I reviewed all medications, new labs and imaging results over the last 24 hours. I personally reviewed no images or EKG's today.    Physical Exam   Vital Signs: Temp: 97.7  F (36.5  C) Temp src: Oral BP: 138/65 Pulse: 82   Resp: 18 SpO2: 93 % O2 Device: Oxymizer cannula Oxygen Delivery: 3 LPM  Weight: 0 lbs 0 oz  Constitutional: awake, alert, cooperative, no apparent distress  Respiratory: No increased work of breathing, no wheezing, rales or rhonchi   Cardiovascular:  regular rate and rhythm, normal S1 and S2, no S3 or S4, and no murmur noted  GI: normal bowel sounds, soft, non-distended, non-tender  Neuropsychiatric: General: normal, calm and normal eye contact    Data   Recent Labs   Lab 01/08/21  0659 01/07/21  0703 01/06/21  0646 01/05/21  1449 01/05/21  0340 01/05/21  0340   WBC  --  14.9* 17.7*  --   --  19.9*   HGB  --  11.1* 11.3*  --   --  13.4   MCV  --  96 96  --   --  95    268 250  --   --  279    131* 126*  --    < > 123*   POTASSIUM 4.5 5.4* 5.3 4.8   < > 5.0   CHLORIDE 95 94 91*  --    < > 88*   CO2 40* 41* 36*  --    < > 34*   BUN 19 18 19  --    < > 15   CR 0.38* 0.42* 0.47* 0.35*   < > 0.34*   ANIONGAP <1* <1* <1*  --    < > 1*   JUANA 8.8 9.0 9.0  --    < > 8.6   GLC 81 121* 118*  --    < > 157*   ALBUMIN  --   --   --   --   --  3.3*   PROTTOTAL  --   --   --   --   --  6.9   BILITOTAL  --   --   --   --   --  0.6   ALKPHOS  --   --   --   --   --  70   ALT  --   --   --   --   --  20   AST  --   --   --   --   --  12   TROPI  --   --   --  <0.015  --  <0.015    < > = values in this interval not displayed.     No results found for this or any previous visit (from the past 24 hour(s)).  Medications       amLODIPine  5 mg Oral Daily     aspirin  81  mg Oral QAM     atorvastatin  20 mg Oral QAM     azithromycin  500 mg Intravenous Q24H     cefTRIAXone  1 g Intravenous Q24H     enoxaparin ANTICOAGULANT  40 mg Subcutaneous Q24H     guaiFENesin  600 mg Oral BID     ipratropium - albuterol 0.5 mg/2.5 mg/3 mL  3 mL Nebulization 4x daily     lisinopril  20 mg Oral QAM     mometasone-formoterol  2 puff Inhalation BID     nicotine  1 patch Transdermal Daily     nicotine   Transdermal Q8H     predniSONE  30 mg Oral BID w/meals     senna-docusate  1 tablet Oral BID    Or     senna-docusate  2 tablet Oral BID     sodium chloride (PF)  3 mL Intracatheter Q8H     sodium chloride (PF)  3 mL Intracatheter Q8H

## 2021-01-08 NOTE — PLAN OF CARE
A&O x4. VSS on 3 LPM oxymizer cannula. CMS intact. Lungs diminished, IS to 750. BS+, BM yesterday, flatus+. Tolerating regular diet. Denies N/V. Voiding adequately. Denies pain. Up w/1-2, GB and walker. Daughter would like care conference tomorrow AM before, hospitalist paged, but not response.

## 2021-01-09 NOTE — PLAN OF CARE
[id number]. Alert and oriented x4. Vital signs stable on 3L O2 (baseline). Lung sounds diminished. RODRIGUEZ. Productive cough, uses IS and flutter valve frequently. SBA GB/walker. Bowel sounds +, BM +. Voiding adequately. Denies pain. Plan to discharge tomorrow 01/10.

## 2021-01-09 NOTE — PLAN OF CARE
A/Ox4. VSS. On 3lpm NC.  LS diminished. +bs, +flatus, +bm. Voiding adequately. Up with assist x1 gb and walker. Denies pain.

## 2021-01-09 NOTE — PROGRESS NOTES
[facility]    Internal Medicine Hospitalist Progress Note  01/09/2021  I evaluated patient on the above date.    Kunal Reyes Jr., MD  [phone] (p)  Text Page        Assessment & Plan New actions/orders today (01/09/2021) are underlined.    Camacho Covington is a 60 year old male w/ PMH of O2 dependent emphysematous COPD, active 1.5 ppd smoker, HTN, HLD and pulmonary nodules, who presented to outside hospital 01/05/2021 with dyspnea and found to be in acute on chronic hypoxic respiratory failure with pneumonia. Transferred to [facility] 1/5/2021 for management.     Left lower lobe pneumonia with sepsis.  Acute exacerbation of chronic obstructive lung disease related to above.  Hypoxic acute respiratory failure due to the above.  History of emphysema and chronic respiratory failure on home oxygen.  Ongoing tobacco use.  * Chronically on 2.5-3L O2. At baseline, has significant debilitation from COPD with very limited exertional tolerance (has to rest with limited activity, walking very short distances such as to the bathroom).  * Presented to OSH with dyspnea and found to be in acute respiratory failure requiring BiPAP; initial labs showed WBC 14.9, lactate normal, d-dimer negative. CXR 1/5 showed new area of increased opacity involving left lung base relating to infiltrate associated with pneumonia, continued radiographic follow-up recommended; minimal left basilar pleural fluid; right lung clear. COVID-19 PCR and Influenza A and B PCR 1/5 were negative. Given IV antibiotics. Transferred to [facility] 1/5.  * Continued on azithromycin, ceftriaxone on admit. Continue IV steroids on admit.BC's 1/5 NGTD. Off BiPAP 1/6. Changed to PO steroids 1/6. Procalcitonin 1.05 on 1/7. Azithromycin completed 1/9.  - Discontinue ceftriaxone (started 1/5) and start Ceftin - stop after 1/18.  - Continue O2 3L/min (at baseline).  - Continue prednisone - change from 30 mg BID --> 40 mg daily start 1/10;  (given additional 10 mg today 1/9).  - Continue Duonebs QID, Dulera BID, guaifenesin, PRN albuterol.  - Monitor cultures.  - Smoking cessation strongly recommended.    Tobacco use d/o.  Active 1.5 ppd smoker.  - Smoking cessation strongly recommended.  - Continue nicotine patch.     Hyponatremia, unclear etiology.  H/o hyponatremia with levels of 122-131 in the past, unclear etiology, question SIADH component.  Sodium 123 at OSH 1/5. Sodium was monitored and gradually increased. Sodium normalized 1/8.  Recent Labs   Lab 01/08/21  0659 01/07/21  0703 01/06/21  0646 01/05/21  0949 01/05/21  0340    131* 126* 127* 123*   - Continue to monitor.    Hypertension (benign essential).  [PTA: amlodipine 5 mg daily; lisinopril 20 mg daily.]  - Continue amlodipine, lisinopril.    Dyslipidemia.  Chronic and stable.  - Continue atorvastatin.     Deconditioning due to acute and chronic medical issues noted above.  Physcial therapy was recommending transitional care unit but pt wanted to go home with home physcial therapy.  - Plan discharge home with physcial therapy when stable.    COVID-19 testing.  COVID-19 PCR Results    COVID-19 PCR Results 1/5/21 1/5/21    0535 0535   SARS-CoV-2 Virus Specimen Source  Nasopharyngeal   Flu A/B & SARS-COV-2 PCR Source Canceled, Test credited (C)    SARS-CoV-2 PCR Result Canceled, Test credited NEGATIVE   (C) Corrected value       Comments are available for some flowsheets but are not being displayed.         COVID-19 Antibody Results, Testing for Immunity    COVID-19 Antibody Results, Testing for Immunity   No data to display.             Diet: Regular Diet Adult    Prophylaxis: PCD's, ambulation. Enoxaparin.  Smith Catheter: not present  Code Status: No CPR- Do NOT Intubate    Disposition Plan   Expected discharge: Tomorrow, recommended to home (pt declining TCU) pending continued improvement in respiratory status.  Entered: Kunal Reyes MD 01/09/2021, 12:19 PM         Interval  "History   Pt doing better.  Feels near baseline.  No chest pain.  Tolerating diet, decent appetite.    -Data reviewed today: I reviewed all new labs and imaging over the last 24 hours. I personally reviewed no images or EKG's today.    Physical Exam    , Blood pressure (!) 142/76, pulse 73, temperature 98.1  F (36.7  C), temperature source Oral, resp. rate 16, height 1.715 m (5' 7.5\"), SpO2 94 %.  There were no vitals filed for this visit.  Vital Signs with Ranges  Temp:  [97.9  F (36.6  C)-98.5  F (36.9  C)] 98.1  F (36.7  C)  Pulse:  [70-93] 73  Resp:  [16-18] 16  BP: ()/(62-78) 142/76  SpO2:  [91 %-97 %] 94 %  Patient Vitals for the past 24 hrs:   BP Temp Temp src Pulse Resp SpO2   01/09/21 1207 (!) 142/76 98.1  F (36.7  C) Oral 73 16 94 %   01/09/21 1158 -- -- -- -- -- 93 %   01/09/21 0748 -- -- -- -- -- 95 %   01/09/21 0739 (!) 147/78 98.5  F (36.9  C) Oral 70 16 97 %   01/09/21 0028 (!) 147/76 98.2  F (36.8  C) Oral 72 18 94 %   01/08/21 1950 94/76 98  F (36.7  C) Oral 93 18 91 %   01/08/21 1919 -- -- -- -- -- 97 %   01/08/21 1641 -- -- -- -- 16 --   01/08/21 1606 133/62 97.9  F (36.6  C) Oral 83 18 93 %   01/08/21 1427 -- -- -- -- -- 93 %     I/O's Last 24 hours  I/O last 3 completed shifts:  In: 780 [P.O.:780]  Out: 1525 [Urine:1525]    Constitutional: Awake, alert, frail.  Respiratory: Diminished in bases. Few crackles left base, no wheezes.  Cardiovascular: RRR, no m/r/g.  GI: Soft, nt, nd, +BS.  Skin/Integumen: No leg edema.  Other:        Data   Recent Labs   Lab 01/09/21  0701 01/08/21  0659 01/07/21  0703 01/06/21  0646 01/05/21  1449 01/05/21  0340 01/05/21  0340   WBC  --   --  14.9* 17.7*  --   --  19.9*   HGB  --   --  11.1* 11.3*  --   --  13.4   MCV  --   --  96 96  --   --  95   PLT  --  257 268 250  --   --  279   NA  --  134 131* 126*  --    < > 123*   POTASSIUM 4.3 4.5 5.4* 5.3 4.8   < > 5.0   CHLORIDE  --  95 94 91*  --    < > 88*   CO2  --  40* 41* 36*  --    < > 34*   BUN  --  19 18 " 19  --    < > 15   CR  --  0.38* 0.42* 0.47* 0.35*   < > 0.34*   ANIONGAP  --  <1* <1* <1*  --    < > 1*   JUANA  --  8.8 9.0 9.0  --    < > 8.6   GLC  --  81 121* 118*  --    < > 157*   ALBUMIN  --   --   --   --   --   --  3.3*   PROTTOTAL  --   --   --   --   --   --  6.9   BILITOTAL  --   --   --   --   --   --  0.6   ALKPHOS  --   --   --   --   --   --  70   ALT  --   --   --   --   --   --  20   AST  --   --   --   --   --   --  12   TROPI  --   --   --   --  <0.015  --  <0.015    < > = values in this interval not displayed.     Recent Labs   Lab Test 01/08/21  0659 01/07/21  0703 01/06/21  0851 01/06/21  0646 01/05/21  0949 01/05/21  0340   GLC 81 121*  --  118* 135* 157*   BGM  --   --  115*  --   --   --          No results found for this or any previous visit (from the past 24 hour(s)).    Medications   All medications were reviewed.      amLODIPine  5 mg Oral Daily     aspirin  81 mg Oral QAM     atorvastatin  20 mg Oral QAM     cefTRIAXone  1 g Intravenous Q24H     enoxaparin ANTICOAGULANT  40 mg Subcutaneous Q24H     guaiFENesin  600 mg Oral BID     ipratropium - albuterol 0.5 mg/2.5 mg/3 mL  3 mL Nebulization 4x daily     lisinopril  20 mg Oral QAM     mometasone-formoterol  2 puff Inhalation BID     nicotine  1 patch Transdermal Daily     nicotine   Transdermal Q8H     predniSONE  30 mg Oral BID w/meals     senna-docusate  1 tablet Oral BID    Or     senna-docusate  2 tablet Oral BID     sodium chloride (PF)  3 mL Intracatheter Q8H     sodium chloride (PF)  3 mL Intracatheter Q8H

## 2021-01-09 NOTE — PLAN OF CARE
Alert and oriented x4. Vital signs stable on 3L oxymizer. Lung sounds diminished. Up with assist of 1. Bowel sounds +, BM -. Voiding adequately. Denies pain.

## 2021-01-10 NOTE — PLAN OF CARE
A/o x4. AVSS on 3LNC per pt baseline. Denies pain. LS coarse/diminished. BS active, +flatus, +BM, reports loose stools today. Tolerates regular diet. A1 w/gb and walker, improved oxygenation upon movement. Provided instructions on proper medication management for discharge teaching.

## 2021-01-10 NOTE — PLAN OF CARE
A/o x4. AVSS on 3LNC per pt baseline. LS coarse/diminished throughout. Voiding adequately. Up A1 w/ gb, walker. Regular diet. Productive cough. Will continue plan of care.

## 2021-01-10 NOTE — PLAN OF CARE
Pt is A+Ox4, VSS. No acute changes in pt status overnight. Lung sounds are coarse to diminished. Intermittent cough. Dyspnea on exertion. On 3 L of O2 on NC overnight. Up assist of 1 with gait belt and walker. Regular diet. Plan to discharge today with home care and oral antibiotics.

## 2021-01-10 NOTE — PROGRESS NOTES
[facility]    Internal Medicine Hospitalist Progress Note  01/10/2021  I evaluated patient on the above date.    Kunal Reyes Jr., MD  [phone] (p)  Text Page        Assessment & Plan New actions/orders today (01/10/2021) are underlined.    Camacho Covington is a 60 year old male w/ PMH of O2 dependent emphysematous COPD, active 1.5 ppd smoker, HTN, HLD and pulmonary nodules, who presented to outside hospital 01/05/2021 with dyspnea and found to be in acute on chronic hypoxic respiratory failure with pneumonia. Transferred to [facility] 1/5/2021 for management.     Left lower lobe pneumonia with sepsis.  Acute exacerbation of chronic obstructive lung disease related to above.  Hypoxic acute respiratory failure due to the above.  History of emphysema and chronic respiratory failure on home oxygen.  Ongoing tobacco use.  * Chronically on 2.5-3L O2. At baseline, has significant debilitation from COPD with very limited exertional tolerance (has to rest with limited activity, walking very short distances such as to the bathroom).  * Presented to OSH with dyspnea and found to be in acute respiratory failure requiring BiPAP; initial labs showed WBC 14.9, lactate normal, d-dimer negative. CXR 1/5 showed new area of increased opacity involving left lung base relating to infiltrate associated with pneumonia, continued radiographic follow-up recommended; minimal left basilar pleural fluid; right lung clear. COVID-19 PCR and Influenza A and B PCR 1/5 were negative. Given IV antibiotics. Transferred to [facility] 1/5.  * Continued on azithromycin, ceftriaxone on admit. Continue IV steroids on admit.BC's 1/5 NGTD. Off BiPAP 1/6. Changed to PO steroids 1/6. Procalcitonin 1.05 on 1/7. Azithromycin completed 1/9. Ceftriaxone changed to Ceftin 1/9.  - Continue Ceftin (started 1/9) - stop after 1/18.  - Continue O2 3L/min (at baseline).  - Continue prednisone 40 mg daily - plan 2 week taper at  discharge.  - Continue Duonebs QID, Dulera BID, guaifenesin, PRN albuterol.  - Monitor cultures.  - Smoking cessation strongly recommended.    Tobacco use d/o.  Active 1.5 ppd smoker.  - Smoking cessation strongly recommended.  - Continue nicotine patch.     Hyponatremia, unclear etiology.  H/o hyponatremia with levels of 122-131 in the past, unclear etiology, question SIADH component.  Sodium 123 at OSH 1/5. Sodium was monitored and gradually increased. Sodium normalized 1/8.  Recent Labs   Lab 01/08/21  0659 01/07/21  0703 01/06/21  0646 01/05/21  0949 01/05/21  0340    131* 126* 127* 123*   - Continue to monitor.    Hypertension (benign essential).  [PTA: amlodipine 5 mg daily; lisinopril 20 mg daily.]  - Continue amlodipine, lisinopril.    Dyslipidemia.  Chronic and stable.  - Continue atorvastatin.     Deconditioning due to acute and chronic medical issues noted above.  Physcial therapy was recommending transitional care unit but pt wanted to go home with home physcial therapy.  - Plan discharge home with physcial therapy when stable.    COVID-19 testing.  COVID-19 PCR Results    COVID-19 PCR Results 1/5/21 1/5/21    0535 0535   SARS-CoV-2 Virus Specimen Source  Nasopharyngeal   Flu A/B & SARS-COV-2 PCR Source Canceled, Test credited (C)    SARS-CoV-2 PCR Result Canceled, Test credited NEGATIVE   (C) Corrected value       Comments are available for some flowsheets but are not being displayed.         COVID-19 Antibody Results, Testing for Immunity    COVID-19 Antibody Results, Testing for Immunity   No data to display.             Diet: Regular Diet Adult  Diet    Prophylaxis: PCD's, ambulation. Enoxaparin.  Smith Catheter: not present  Code Status: No CPR- Do NOT Intubate    Disposition Plan   Expected discharge: Today, recommended to prior living arrangement.  Entered: Kunal Reyes MD 01/10/2021, 10:53 AM         Interval History   Doing relatively well.  Ambulating within his room relatively  "well.    -Data reviewed today: I reviewed all new labs and imaging over the last 24 hours. I personally reviewed no images or EKG's today.    Physical Exam    , Blood pressure (!) 147/72, pulse 74, temperature 97.3  F (36.3  C), temperature source Oral, resp. rate 16, height 1.715 m (5' 7.5\"), SpO2 95 %.  There were no vitals filed for this visit.  Vital Signs with Ranges  Temp:  [97.3  F (36.3  C)-98.1  F (36.7  C)] 97.3  F (36.3  C)  Pulse:  [73-96] 74  Resp:  [16-18] 16  BP: (138-147)/(68-76) 147/72  SpO2:  [91 %-96 %] 95 %  Patient Vitals for the past 24 hrs:   BP Temp Temp src Pulse Resp SpO2   01/10/21 0732 (!) 147/72 97.3  F (36.3  C) Oral 74 16 95 %   01/10/21 0400 -- -- -- -- 16 --   01/10/21 0004 (!) 141/71 97.7  F (36.5  C) Oral 96 18 96 %   01/09/21 2003 138/68 97.8  F (36.6  C) Oral 91 16 94 %   01/09/21 1944 -- -- -- -- -- 94 %   01/09/21 1626 (!) 140/74 97.9  F (36.6  C) Oral 88 16 91 %   01/09/21 1455 -- -- -- -- -- 91 %   01/09/21 1207 (!) 142/76 98.1  F (36.7  C) Oral 73 16 94 %   01/09/21 1158 -- -- -- -- -- 93 %     I/O's Last 24 hours  I/O last 3 completed shifts:  In: 443 [P.O.:440; I.V.:3]  Out: 1075 [Urine:1075]    Constitutional: Awake, alert, frail, brighter.  Respiratory: Diminished in bases. Few crackles left base, no wheezes.  Cardiovascular: RRR, no m/r/g.  GI: Soft, nt, nd, +BS.  Skin/Integumen:   Other:        Data   Recent Labs   Lab 01/10/21  0702 01/09/21  0701 01/08/21  0659 01/07/21  0703 01/06/21  0646 01/05/21  1449 01/05/21  0340 01/05/21 0340   WBC  --   --   --  14.9* 17.7*  --   --  19.9*   HGB  --   --   --  11.1* 11.3*  --   --  13.4   MCV  --   --   --  96 96  --   --  95   PLT  --   --  257 268 250  --   --  279   NA  --   --  134 131* 126*  --    < > 123*   POTASSIUM 4.4 4.3 4.5 5.4* 5.3 4.8   < > 5.0   CHLORIDE  --   --  95 94 91*  --    < > 88*   CO2  --   --  40* 41* 36*  --    < > 34*   BUN  --   --  19 18 19  --    < > 15   CR  --   --  0.38* 0.42* 0.47* 0.35*   " < > 0.34*   ANIONGAP  --   --  <1* <1* <1*  --    < > 1*   JUANA  --   --  8.8 9.0 9.0  --    < > 8.6   GLC  --   --  81 121* 118*  --    < > 157*   ALBUMIN  --   --   --   --   --   --   --  3.3*   PROTTOTAL  --   --   --   --   --   --   --  6.9   BILITOTAL  --   --   --   --   --   --   --  0.6   ALKPHOS  --   --   --   --   --   --   --  70   ALT  --   --   --   --   --   --   --  20   AST  --   --   --   --   --   --   --  12   TROPI  --   --   --   --   --  <0.015  --  <0.015    < > = values in this interval not displayed.     Recent Labs   Lab Test 01/08/21  0659 01/07/21  0703 01/06/21  0851 01/06/21  0646 01/05/21  0949 01/05/21  0340   GLC 81 121*  --  118* 135* 157*   BGM  --   --  115*  --   --   --          No results found for this or any previous visit (from the past 24 hour(s)).    Medications   All medications were reviewed.      amLODIPine  5 mg Oral Daily     aspirin  81 mg Oral QAM     atorvastatin  20 mg Oral QAM     cefuroxime  500 mg Oral Q12H LISA     enoxaparin ANTICOAGULANT  40 mg Subcutaneous Q24H     guaiFENesin  600 mg Oral BID     ipratropium - albuterol 0.5 mg/2.5 mg/3 mL  3 mL Nebulization 4x daily     lisinopril  20 mg Oral QAM     mometasone-formoterol  2 puff Inhalation BID     nicotine  1 patch Transdermal Daily     nicotine   Transdermal Q8H     predniSONE  40 mg Oral Daily     senna-docusate  1 tablet Oral BID    Or     senna-docusate  2 tablet Oral BID     sodium chloride (PF)  3 mL Intracatheter Q8H     sodium chloride (PF)  3 mL Intracatheter Q8H

## 2021-01-10 NOTE — PROGRESS NOTES
Pt is on 2 1/2 L NC with SpO2 94-67% Breath sounds were diminished.   All nebs were given as ordered by MD.  RT will continue to monitor and follow.       Carri Ramsey, RT  1/10/2021

## 2021-01-10 NOTE — PLAN OF CARE
Physical Therapy Discharge Summary    Reason for therapy discharge:    Discharged to home.    Progress towards therapy goal(s). See goals on Care Plan in [facility] electronic health record for goal details.  Goals not met.  Barriers to achieving goals:   limited tolerance for therapy and discharge from facility.    Therapy recommendation(s):    Continued therapy is recommended.  Rationale/Recommendations:  Recommended TCU; however, pt refuses. Recommended home care service for ongoing functional activity tolerance benefits including strength, activity tolerance and balance.Ambulated 2x20'; 1st with no AD and Raysa with LOB when turning, then with FWW and CGA with no LOB. Pt needing extended rest between, desatting to 86-87 & recovering quickly to 90 but needing prolonged rest to recover to 91-92.

## 2021-01-10 NOTE — DISCHARGE SUMMARY
[facility]  Discharge Summary        Camacho Covington MRN# [id number]   YOB: 1960 Age: 60 year old     Date of Admission: 1/5/2021  Date of Discharge: 1/10/2021  Admitting Physician: Victor Manuel Hassan MD  Discharge Physician: Kunal Reyes MD     Primary Provider: Theo Ponce  Primary Care Physician Phone Number: [phone]         Discharge Diagnoses:   1. Left lower lobe pneumonia with sepsis.  2. Acute exacerbation of chronic obstructive lung disease related to above.  3. Hypoxic acute respiratory failure due to the above.  4. Emphysema and chronic respiratory failure on home oxygen.  5. Ongoing tobacco use.  6. Hyponatremia, unclear etiology.  7. Deconditioning due to acute and chronic medical issues noted above.        Other Chronic Medical Problems:      1. Hypertension (benign essential).  2. Dyslipidemia.  3. H/o hyponatremia with levels of 122-131 in the past, unclear etiology, question SIADH component.       Allergies:       No Known Allergies        Discharge Medications:        Current Discharge Medication List      START taking these medications    Details   cefuroxime (CEFTIN) 500 MG tablet Take 1 tablet (500 mg) by mouth every 12 hours  Qty: 17 tablet, Refills: 0    Associated Diagnoses: Community acquired pneumonia of left lower lobe of lung      guaiFENesin (MUCINEX) 600 MG 12 hr tablet Take 1 tablet (600 mg) by mouth 2 times daily  Qty: 30 tablet, Refills: 1    Associated Diagnoses: Community acquired pneumonia of left lower lobe of lung      predniSONE (DELTASONE) 10 MG tablet 4 tabs daily for 3 days, then 3 tabs daily for 3 days, then 2 tabs daily for 3 days, then 1 tab daily for 3 days, then stop  Qty: 30 tablet, Refills: 0    Associated Diagnoses: Acute on chronic respiratory failure with hypoxia and hypercapnia (H)         CONTINUE these medications which have CHANGED    Details   ibuprofen (ADVIL/MOTRIN) 200 MG capsule Take 2 capsules  (400 mg) by mouth every 4 hours as needed for fever ; do not use while taking both prednisone and aspirin.  Qty: 120 capsule    Associated Diagnoses: Pain         CONTINUE these medications which have NOT CHANGED    Details   amLODIPine (NORVASC) 5 MG tablet TAKE ONE TABLET BY MOUTH ONCE DAILY  Qty: 90 tablet, Refills: 0    Associated Diagnoses: Essential hypertension with goal blood pressure less than 140/90      aspirin 81 MG tablet Take 81 mg by mouth every morning   Qty: 90 tablet, Refills: 3      atorvastatin (LIPITOR) 20 MG tablet Take 1 tablet (20 mg) by mouth daily  Qty: 90 tablet, Refills: 3    Associated Diagnoses: CARDIOVASCULAR SCREENING; LDL GOAL LESS THAN 130      ipratropium-albuterol (COMBIVENT RESPIMAT)  MCG/ACT inhaler INHALE ONE PUFF BY MOUTH FOUR TIMES A DAY DUE FOR PHYSICAL  Qty: 4 g, Refills: 11    Associated Diagnoses: Pulmonary emphysema, unspecified emphysema type (H)      lisinopril (ZESTRIL) 20 MG tablet Take 1 tablet (20 mg) by mouth daily  Qty: 90 tablet, Refills: 3    Associated Diagnoses: Essential hypertension with goal blood pressure less than 140/90      mometasone-formoterol (DULERA) 200-5 MCG/ACT inhaler Inhale 2 puffs into the lungs 2 times daily  Qty: 1 Inhaler, Refills: 11    Comments: Please let me know if there is a less expensive option/alternative covered (LABA plus ICS)  Associated Diagnoses: Pulmonary emphysema, unspecified emphysema type (H)      multivitamin, therapeutic with minerals (MULTI-VITAMIN) TABS tablet Take 1 tablet by mouth every morning   Qty: 100 tablet, Refills: 3      albuterol (VENTOLIN HFA) 108 (90 Base) MCG/ACT inhaler INHALE 2 PUFFS INTO THE LUNGS EVERY 4 HOURS AS NEEDED FOR SHORTNESS OF BREATH, DIFFICULTY BREATHING OR WHEEZING.  Qty: 18 g, Refills: 11    Comments: Pharmacy may dispense brand covered by insurance (Proair, or proventil or ventolin or generic albuterol inhaler)  Associated Diagnoses: Pulmonary emphysema, unspecified emphysema type  (H)      nicotine (NICODERM CQ) 21 MG/24HR 24 hr patch Place 1 patch onto the skin every 24 hours Place 21mg nicotine patches once daily for 6-8 weeks, then 14mg patches once daily for 2 weeks and finally 7mg patches once daily for 2 weeks.  Qty: 30 patch, Refills: 5    Comments: OK for refills on the 21mg patches or 14mg or 7mg.  Associated Diagnoses: Tobacco abuse      order for DME Equipment being ordered: Oxygen with portability. 2 liters per nasal cannula continuous.  Qty: 1 Device, Refills: 0    Associated Diagnoses: COPD (chronic obstructive pulmonary disease) (H)                 Discharge Instructions and Follow-Up:      Discharge Orders      Reason for your hospital stay    Left lower lobe pneumonia.  Acute COPD exacerbation.     Follow-up and recommended labs and tests    Follow-up with primary care provider, Theo Ponce, within 7 days for hospital follow-up.  No follow up labs or test are needed.     Activity    Your activity upon discharge: activity as tolerated     Walker    DME Documentation:   Describe the reason for need to support medical necessity: Impaired balance & gait.     I, the undersigned, certify that the above prescribed supplies are medically necessary for this patient and is both reasonable and necessary in reference to accepted standards of medical and necessary in reference to accepted standards of medical practice in the treatment of this patient's condition and is not prescribed as a convenience.     Oxygen Adult/Peds    Oxygen Documentation:   I certify that this patient, Camacho Covington has been under my care (or a nurse practitioner or physican's assistant working with me). This is the face-to-face encounter for oxygen medical necessity.      Camacho Covington is now in a chronic stable state and continues to require supplemental oxygen. Patient has continued oxygen desaturation due to Chronic Respiratory Failure with Hypoxia J93.11  COPD J44.9.    Alternative treatment(s) tried  or considered and deemed clinically infective for treatment of Chronic Respiratory Failure with Hypoxia J93.11  COPD J44.9 include nebulizers, inhalers and steroids.  If portability is ordered, is the patient mobile within the home? yes    **Patients who qualify for home O2 coverage under the CMS guidelines require ABG tests or O2 sat readings obtained closest to, but no earlier than 2 days prior to the discharge, as evidence of the need for home oxygen therapy. Testing must be performed while patient is in the chronic stable state. See notes for O2 sats.**     Diet    Follow this diet upon discharge: Orders Placed This Encounter      Regular Diet Adult             Consultations This Hospital Stay:      N/A.        Admission History:      Please see the H&P by Victor Manuel Hassan MD on 1/5/2021 for complete details. Briefly, Camacho Covington is a 60 year old male w/ PMH of O2 dependent emphysematous COPD, active 1.5 ppd smoker, HTN, HLD and pulmonary nodules, who presented to outside hospital 01/05/2021 with dyspnea and found to be in acute on chronic hypoxic respiratory failure with pneumonia. Transferred to [facility] 1/5/2021 for management.        Problem Oriented Hospital Course:        Left lower lobe pneumonia with sepsis.  Acute exacerbation of chronic obstructive lung disease related to above.  Hypoxic acute respiratory failure due to the above.  History of emphysema and chronic respiratory failure on home oxygen.  Ongoing tobacco use.  * Chronically on 2.5-3L O2. At baseline, has significant debilitation from COPD with very limited exertional tolerance (has to rest with limited activity, walking very short distances such as to the bathroom).  * Presented to OSH with dyspnea and found to be in acute respiratory failure requiring BiPAP; initial labs showed WBC 14.9, lactate normal, d-dimer negative. CXR 1/5 showed new area of increased opacity involving left lung base relating to infiltrate associated with  pneumonia, continued radiographic follow-up recommended; minimal left basilar pleural fluid; right lung clear. COVID-19 PCR and Influenza A and B PCR 1/5 were negative. Given IV antibiotics. [facility]  * Continued on azithromycin, ceftriaxone on admit. Continue IV steroids on admit.BC's 1/5 NGTD. Off BiPAP 1/6. Changed to PO steroids 1/6. Procalcitonin 1.05 on 1/7. Azithromycin completed 1/9. Ceftriaxone changed to Ceftin 1/9.  - Continue Ceftin (started 1/9) - stop after 1/18.  - Continue O2 3L/min (at baseline).  - Continue prednisone 40 mg daily - plan 2 week taper at discharge.  - Continue Duonebs QID, Dulera BID, guaifenesin, PRN albuterol.  - Monitor cultures.  - Smoking cessation strongly recommended.     Tobacco use d/o.  Active 1.5 ppd smoker.  - Smoking cessation strongly recommended.  - Continue nicotine patch.     Hyponatremia, unclear etiology.  H/o hyponatremia with levels of 122-131 in the past, unclear etiology, question SIADH component.  Sodium 123 at OSH 1/5. Sodium was monitored and gradually increased. Sodium normalized 1/8.  - Continue to monitor outpatient.     Hypertension (benign essential).  [PTA: amlodipine 5 mg daily; lisinopril 20 mg daily.]  - Continue amlodipine, lisinopril.     Dyslipidemia.  Chronic and stable.  - Continue atorvastatin.     Deconditioning due to acute and chronic medical issues noted above.  Physcial therapy was recommending transitional care unit but pt wanted to go home with home physcial therapy.  - Plan discharge home with physcial therapy when stable.     COVID-19 testing.       COVID-19 PCR Results    COVID-19 PCR Results 1/5/21 1/5/21     0535 0535   SARS-CoV-2 Virus Specimen Source   Nasopharyngeal   Flu A/B & SARS-COV-2 PCR Source Canceled, Test credited (C)     SARS-CoV-2 PCR Result Canceled, Test credited NEGATIVE   (C) Corrected value        Comments are available for some flowsheets but are not being displayed.           COVID-19  Antibody Results, Testing for Immunity    COVID-19 Antibody Results, Testing for Immunity   No data to display.                     Pending Results:        Unresulted Labs Ordered in the Past 30 Days of this Admission     Date and Time Order Name Status Description    2021 Blood culture Preliminary     2021 Blood culture Preliminary                 Discharge Disposition:      Discharged to home.        Discharge Time:      Less than 30 minutes.        Key Imaging Studies, Lab Findings and Procedures/Surgeries:        Results for orders placed or performed during the hospital encounter of 21   Echocardiogram Complete    Narrative    [id number]  [id number]  [id number]  [id number]^FLO^VILMA^STEFANY           [facility]  Echocardiography Laboratory  [address] [place] [address]        Name: LENCHO MORALES  MRN: [id number]  : 1960  Study Date: 2021 01:11 PM  Age: 60 yrs  Gender: Male  Patient Location: [facility]  Reason For Study: SOB  Ordering Physician: VILMA ROSSI  Referring Physician: KELLEY FERGUSON  Performed By: Sierra Rudd     BSA: 1.7 m2  Height: 67 in  Weight: 133 lb  HR: 86  BP: 121/67 mmHg  _____________________________________________________________________________  __        Procedure  Complete Portable Echo Adult.  _____________________________________________________________________________  __        Interpretation Summary     The visual ejection fraction is estimated at 55-60%.  Left ventricular diastolic function is normal.  There is mild biatrial enlargement.  Right ventricular systolic pressure could not be approximated due to  inadequate tricuspid regurgitation.  The inferior vena cava is normal.  Sinus rhythm was noted.  There is no comparison study available.  _____________________________________________________________________________  __        Left Ventricle  The left ventricle is normal in size. There is normal left ventricular  wall  thickness. The visual ejection fraction is estimated at 55-60%. Left  ventricular diastolic function is normal. Normal left ventricular wall motion.     Right Ventricle  The right ventricle is normal in structure, function and size.     Atria  There is mild biatrial enlargement. Chiari network (normal variant) is noted.  Intact atrial septum.     Mitral Valve  The mitral valve leaflets appear thickened, but open well. There is trace  mitral regurgitation.        Tricuspid Valve  The tricuspid valve is normal in structure and function. There is trace  tricuspid regurgitation. Right ventricular systolic pressure could not be  approximated due to inadequate tricuspid regurgitation.     Aortic Valve  The aortic valve is trileaflet with aortic valve sclerosis.     Pulmonic Valve  Normal pulmonic valve.     Vessels  Normal size aorta. Normal size ascending aorta. The inferior vena cava is  normal.     Pericardium  The pericardium appears normal.        Rhythm  Sinus rhythm was noted.  _____________________________________________________________________________  __  MMode/2D Measurements & Calculations  IVSd: 1.1 cm     LVIDd: 4.4 cm  LVIDs: 3.1 cm  LVPWd: 1.1 cm  FS: 29.5 %  LV mass(C)d: 164.3 grams  LV mass(C)dI: 96.6 grams/m2  Ao root diam: 3.6 cm  LA dimension: 4.1 cm  asc Aorta Diam: 3.2 cm  LA/Ao: 1.1  LVOT diam: 2.3 cm  LVOT area: 4.1 cm2  LA Volume (BP): 64.1 ml  LA Volume Index (BP): 37.7 ml/m2  RWT: 0.50           Doppler Measurements & Calculations  MV E max aviva: 80.5 cm/sec  MV A max aviva: 69.4 cm/sec  MV E/A: 1.2  MV dec time: 0.18 sec  PA V2 max: 115.5 cm/sec  PA max P.3 mmHg  PA acc time: 0.12 sec  E/E' av.1  Lateral E/e': 5.1  Medial E/e': 7.1           _____________________________________________________________________________  __           Report approved by: Freddy Martinez 2021 02:31 PM

## 2021-01-10 NOTE — CONSULTS
Care Management Initial Consult    General Information  Assessment completed with: Patient,    Type of CM/SW Visit: Offer D/C Planning    Primary Care Provider verified and updated as needed: Yes   Readmission within the last 30 days:        Reason for Consult: discharge planning  Advance Care Planning:            Communication Assessment  Patient's communication style: spoken language (English or Bilingual)    Hearing Difficulty or Deaf: no   Wear Glasses or Blind: yes    Cognitive  Cognitive/Neuro/Behavioral: WDL  Level of Consciousness: alert  Arousal Level: opens eyes spontaneously  Orientation: oriented x 4  Mood/Behavior: calm, cooperative  Best Language: 0 - No aphasia  Speech: clear, logical    Living Environment:   People in home: spouse     Current living Arrangements: mobile home      Able to return to prior arrangements:         Family/Social Support:  Care provided by:    Provides care for:    Marital Status:              Description of Support System:           Current Resources:   Skilled Home Care Services:    Community Resources: None  Equipment currently used at home: none  Supplies currently used at home: Oxygen Tubing/Supplies    Employment/Financial:  Employment Status: retired        Financial Concerns:             Lifestyle & Psychosocial Needs:        Socioeconomic History     Marital status:      Spouse name: Not on file     Number of children: Not on file     Years of education: Not on file     Highest education level: Not on file     Tobacco Use     Smoking status: Current Every Day Smoker     Packs/day: 0.50     Years: 44.00     Pack years: 22.00     Types: Cigarettes     Smokeless tobacco: Never Used     Tobacco comment: started at age 13,  3-4 cigs daily   Substance and Sexual Activity     Alcohol use: No     Drug use: No     Sexual activity: Yes     Partners: Female       Functional Status:  Prior to admission patient needed assistance:              Mental Health  "Status:  Mental Health Status: No Current Concerns       Chemical Dependency Status:  Chemical Dependency Status: No Current Concerns             Values/Beliefs:  Spiritual, Cultural Beliefs, Sabianist Practices, Values that affect care: no               Additional Information:  Met with patient to explain role in discharge planning.   Care Management Discharge Note    Discharge Date: 01/11/21       Discharge Disposition:      Discharge Services: None    Discharge DME: None    Discharge Transportation: family or friend will provide    Private pay costs discussed: Not applicable    PAS Confirmation Code:    Patient/family educated on Medicare website which has current facility and service quality ratings: no    Education Provided on the Discharge Plan:    Persons Notified of Discharge Plans: Pt  Patient/Family in Agreement with the Plan: yes    Handoff Referral Completed: No    Additional Information:  Pt lives with spouse. On home oxygen. PT recommend home with Home PT.  Pt declines feeling he does not need this.  \"I feel back to baseline\" Pt wishes to make own PCP appt.   Family to come down today (From [place]) with clothes and Pt's own oxygen.  Bedside RN to coordinate discharge time.   Pt voices no other needs for discharge.         Giana Haynes RN      "

## 2021-01-11 NOTE — TELEPHONE ENCOUNTER
"Hospital/TCU/ED for chronic condition Discharge Protocol    \"Hi, my name is Rubina Lawton RN, a registered nurse, and I am calling from [facility].  I am calling to follow up and see how things are going for you after your recent emergency visit/hospital/TCU stay.\"    Tell me how you are doing now that you are home?\" feeling great      Discharge Instructions    \"Let's review your discharge instructions.  What is/are the follow-up recommendations?  Pt. Response: see PCP    \"Has an appointment with your primary care provider been scheduled?\"   No (schedule appointment)    \"When you see the provider, I would recommend that you bring your medications with you.\"    Medications    \"Tell me what changed about your medicines when you discharged?\"    Changes to chronic meds?    0-1    \"What questions do you have about your medications?\"    None     New diagnoses of heart failure, COPD, diabetes, or MI?    No              Post Discharge Medication Reconciliation Status: discharge medications reconciled, continue medications without change.    Was MTM referral placed (*Make sure to put transitions as reason for referral)?   No    Call Summary    \"What questions or concerns do you have about your recent visit and your follow-up care?\"     none    \"If you have questions or things don't continue to improve, we encourage you contact us through the main clinic number (give number).  Even if the clinic is not open, triage nurses are available 24/7 to help you.     We would like you to know that our clinic has extended hours (provide information).  We also have urgent care (provide details on closest location and hours/contact info)\"      \"Thank you for your time and take care!\"             "

## 2021-01-11 NOTE — TELEPHONE ENCOUNTER
ED/UC/IP follow up phone call:[facility] discharge 1/10/21  Acute on Chronic respiratory failure with hypoxia and hypecapnia    RN please call to follow up.    Number of ED visits in past 12 months = 1

## 2021-01-18 NOTE — PROGRESS NOTES
Camacho is a 60 year old who is being evaluated via a billable video visit.      How would you like to obtain your AVS? MyChart  If the video visit is dropped, the invitation should be resent by: Send to e-mail at: [email or web address]  Will anyone else be joining your video visit? No    Video Start Time: 9:33     ASSESSMENT:   (J96.21,  J96.22) Acute on chronic respiratory failure with hypoxia and hypercapnia (H)  (primary encounter diagnosis)  Comment: Doing well at this time.  Of note, he has quit smoking.  Congratulations!  Plan: No changes.  The Combivent taken 4 times a day and the Dulera 2 puffs twice a day are a good combination to keep your emphysema under control.  Continue to use the nicotine patches as you have been to stay off the cigarettes.  This is the one way to prevent more lung damage and let your lungs heal up a little bit.  Let us know if you are having difficulty staying off the cigarettes.    (J18.9) Pneumonia of left lower lobe due to infectious organism  Comment: The chest x-ray was abnormal and about a 6-week follow-up is recommended.  You also had a CT scan about 2 years ago in March with some tiny pulmonary nodules.  You were recommended a 1 year follow-up.  You are not sure he wanted going for any x-rays right now but consider a chest x-ray and a CT scan when the weather warms up and you are feeling like you can go in for x-rays.  The reason for the follow-up is to make sure the pneumonia is completely gone and to follow-up for lung nodules to make sure there is not some cancer.    (Z72.0) Tobacco abuse  Comment: see above     (R60.9) Peripheral edema  Comment: You have had swelling in your legs which was better in the hospital when you had the compression wraps.  Plan: Peripheral edema (swelling in legs and feet) is common and has many possible causes.  Most common is poor vein circulation.  The blood returning to heart goes through valves that may not work well.  The valves can be impaired  from previous injuries to the leg, blood clots, varicose veins and it can be hereditary.    Other causes include lung, heart, kidney, liver, thyroid problems.  Sleep apnea can also cause swelling.  Some medications can make swelling worse.   Things that help the swelling include limiting salt in the diet.  Elevate legs when possible (feet above the level of the pelvis).   Compression stockings are one of the most effective treatments.    If the swelling is not improving on the compression stockings, you could cut down on the amlodipine from 5 current 5 mg to 2.5 mg daily.  Be sure to monitor your blood pressure if you would decrease the dose.    (R91.8) Pulmonary nodules  Comment: see above     (J43.9) Pulmonary emphysema, unspecified emphysema type (H)    (E87.1) Hyponatremia  Comment: Good at the time of discharge.    (I10) Essential hypertension with goal blood pressure less than 140/90        Frieda Sauer is a 60 year old who presents to clinic today for the following health issues   Chief Complaint   Patient presents with     Hospital F/U      Hospital Follow-up Visit:    Hospital/Nursing Home/IP Rehab Facility: [facility]  Date of Admission: 01/05/21  Date of Discharge: 01/10/21  Reason(s) for Admission: pneumonia      Was your hospitalization related to COVID-19? No   Problems taking medications regularly:  None  Medication changes since discharge: None  Problems adhering to non-medication therapy:  None    Summary of hospitalization:  [facility] discharge summary reviewed  See abstracted notes from recent hospital stay below.   Diagnostic Tests/Treatments reviewed.  Follow up needed: none  Other Healthcare Providers Involved in Patient s Care:         None  Update since discharge: improved. Post Discharge Medication Reconciliation: discharge medications reconciled, continue medications without change.  Plan of care communicated with patient          Hospitalized 1/5-1/10.  He  "was hospitalized for left lower lobe pneumonia and exacerbation of COPD.  He had hypoxic acute respiratory failure.  Other problems in the hospital included some hyponatremia and deconditioning.  His sodium was back to normal at the time of discharge.  Please see abstracted hospital notes below.    Feeling \"way better\" than when he went in the hospital.   Better, in fact than in the past year.   He feels breathing medications working well.   Has been using Combivent.    On Dulera:two puffs twice daily.   Has not been needing albuteral inhaler.   Does not have neb machine at home.   On oxygen at 2% continuous.  oxygen sats avg about 95-96%.  With exertion, it drops quickly.   Just finished antibiotic.   On prednisone two pills now and tapers tomorrow.   Has not smoked since hospital.  He has been using nicotine patches.  Spouse smokes outside a few per day.   Still taking Mucinex.     He has declined PT.  Feels he is at his usual baseline for activity and improved compared to before hospital stay.     Patient Active Problem List   Diagnosis     Pulmonary emphysema (H)     Essential hypertension with goal blood pressure less than 140/90     Hyperlipidemia LDL goal <100     Tobacco abuse     Hyponatremia     Skin lesion     Pulmonary nodules     Acute on chronic respiratory failure (H)      ROS:General: Appetite improved, sleep is better.  Energy still a little low.   CV: No chest pains or palpitations.  A little swelling in feet a times.   GI: No nausea, vomiting,  heartburn, abdominal pain, diarrhea, constipation or change in bowel habits  : No urinary frequency or dysuria, bladder or kidney problems  Musculoskeletal: No significant muscle or joint pains  Psychiatric: No problems with anxiety, depression or mental health     Vitals:  No vitals were obtained today due to virtual visit.    Physical Exam   GENERAL: alert, no distress and oxygen nasal cannula.   EYES: Eyes grossly normal to inspection.  No discharge or " erythema, or obvious scleral/conjunctival abnormalities.  RESP: No audible wheeze, cough, or visible cyanosis.  No visible retractions or increased work of breathing.    SKIN: Visible skin clear. No significant rash, abnormal pigmentation or lesions.  NEURO: Cranial nerves grossly intact.  Mentation and speech appropriate for age.  PSYCH: Mentation appears normal, affect normal/bright, judgement and insight intact, normal speech and appearance well-groomed.      Video-Visit Details    Type of service:  Video Visit    Video End Time:9:47    Originating Location (pt. Location): Home    Distant Location (provider location):  [facility]     Platform used for Video Visit: [employer]   ========================================================  See abstracted notes from recent hospital stay below.     [facility]  Discharge Summary        Camacho Covington MRN# [id number]   YOB: 1960 Age: 60 year old      Date of Admission:      1/5/2021  Date of Discharge:      1/10/2021       Discharge Diagnoses:   1. Left lower lobe pneumonia with sepsis.  2. Acute exacerbation of chronic obstructive lung disease related to above.  3. Hypoxic acute respiratory failure due to the above.  4. Emphysema and chronic respiratory failure on home oxygen.  5. Ongoing tobacco use.  6. Hyponatremia, unclear etiology.  7. Deconditioning due to acute and chronic medical issues noted above.         Other Chronic Medical Problems:      1. Hypertension (benign essential).  2. Dyslipidemia.  3. H/o hyponatremia with levels of 122-131 in the past, unclear etiology, question SIADH component.    Current Discharge Medication List            START taking these medications     Details   cefuroxime (CEFTIN) 500 MG tablet Take 1 tablet (500 mg) by mouth every 12 hours  Qty: 17 tablet, Refills: 0     Associated Diagnoses: Community acquired pneumonia of left lower lobe of lung       guaiFENesin  (MUCINEX) 600 MG 12 hr tablet Take 1 tablet (600 mg) by mouth 2 times daily  Qty: 30 tablet, Refills: 1     Associated Diagnoses: Community acquired pneumonia of left lower lobe of lung       predniSONE (DELTASONE) 10 MG tablet 4 tabs daily for 3 days, then 3 tabs daily for 3 days, then 2 tabs daily for 3 days, then 1 tab daily for 3 days, then stop  Qty: 30 tablet, Refills: 0     Associated Diagnoses: Acute on chronic respiratory failure with hypoxia and hypercapnia (H)     Reason for your hospital stay     Left lower lobe pneumonia.  Acute COPD exacerbation.          Follow-up and recommended labs and tests     Follow-up with primary care provider, Theo Ponce, within 7 days for hospital follow-up.  No follow up labs or test are needed.          Activity     Your activity upon discharge: activity as tolerated         Admission History:      Please see the H&P by Victor Manuel Hassan MD on 1/5/2021 for complete details. Briefly, Camacho Covington is a 60 year old male w/ PMH of O2 dependent emphysematous COPD, active 1.5 ppd smoker, HTN, HLD and pulmonary nodules, who presented to outside hospital 01/05/2021 with dyspnea and found to be in acute on chronic hypoxic respiratory failure with pneumonia. Transferred to [facility] 1/5/2021 for management.       Problem Oriented Hospital Course:         Left lower lobe pneumonia with sepsis.  Acute exacerbation of chronic obstructive lung disease related to above.  Hypoxic acute respiratory failure due to the above.  History of emphysema and chronic respiratory failure on home oxygen.  Ongoing tobacco use.  * Chronically on 2.5-3L O2. At baseline, has significant debilitation from COPD with very limited exertional tolerance (has to rest with limited activity, walking very short distances such as to the bathroom).  * Presented to OSH with dyspnea and found to be in acute respiratory failure requiring BiPAP; initial labs showed WBC 14.9, lactate normal, d-dimer  negative. CXR 1/5 showed new area of increased opacity involving left lung base relating to infiltrate associated with pneumonia, continued radiographic follow-up recommended; minimal left basilar pleural fluid; right lung clear. COVID-19 PCR and Influenza A and B PCR 1/5 were negative. Given IV antibiotics. Transferred to [facility] 1/5.  * Continued on azithromycin, ceftriaxone on admit. Continue IV steroids on admit.BC's 1/5 NGTD. Off BiPAP 1/6. Changed to PO steroids 1/6. Procalcitonin 1.05 on 1/7. Azithromycin completed 1/9. Ceftriaxone changed to Ceftin 1/9.  - Continue Ceftin (started 1/9) - stop after 1/18.  - Continue O2 3L/min (at baseline).  - Continue prednisone 40 mg daily - plan 2 week taper at discharge.  - Continue Duonebs QID, Dulera BID, guaifenesin, PRN albuterol.  - Monitor cultures.  - Smoking cessation strongly recommended.     Tobacco use d/o.  Active 1.5 ppd smoker.  - Smoking cessation strongly recommended.  - Continue nicotine patch.     Hyponatremia, unclear etiology.  H/o hyponatremia with levels of 122-131 in the past, unclear etiology, question SIADH component.  Sodium 123 at OSH 1/5. Sodium was monitored and gradually increased. Sodium normalized 1/8.  - Continue to monitor outpatient.     Hypertension (benign essential).  [PTA: amlodipine 5 mg daily; lisinopril 20 mg daily.]  - Continue amlodipine, lisinopril.     Dyslipidemia.  Chronic and stable.  - Continue atorvastatin.     Deconditioning due to acute and chronic medical issues noted above.  Physcial therapy was recommending transitional care unit but pt wanted to go home with home physcial therapy.  - Plan discharge home with physcial therapy when stable.    CXR:  IMPRESSION: New area of increased opacity involving left lung base relating to infiltrate associated with pneumonia. Continued radiographic follow-up recommended. Minimal left basilar pleural fluid. Right lung clear. Changes of COPD/emphysema. Normal   heart size and  pulmonary vascularity. Atherosclerotic vascular calcification.    Chest CT 3/18/2019:  IMPRESSION:  1.  Centrilobular emphysema.  2. Tiny bilateral noncalcified pulmonary nodules are present.  Follow-up chest CT in 12 months is suggested in reassessment.

## 2021-01-18 NOTE — PATIENT INSTRUCTIONS
ASSESSMENT:   (J96.21,  J96.22) Acute on chronic respiratory failure with hypoxia and hypercapnia (H)  (primary encounter diagnosis)  Comment: Doing well at this time.  Of note, he has quit smoking.  Congratulations!  Plan: No changes.  The Combivent taken 4 times a day and the Dulera 2 puffs twice a day are a good combination to keep your emphysema under control.  Continue to use the nicotine patches as you have been to stay off the cigarettes.  This is the one way to prevent more lung damage and let your lungs heal up a little bit.  Let us know if you are having difficulty staying off the cigarettes.    (J18.9) Pneumonia of left lower lobe due to infectious organism  Comment: The chest x-ray was abnormal and about a 6-week follow-up is recommended.  You also had a CT scan about 2 years ago in March with some tiny pulmonary nodules.  You were recommended a 1 year follow-up.  You are not sure he wanted going for any x-rays right now but consider a chest x-ray and a CT scan when the weather warms up and you are feeling like you can go in for x-rays.  The reason for the follow-up is to make sure the pneumonia is completely gone and to follow-up for lung nodules to make sure there is not some cancer.    (Z72.0) Tobacco abuse  Comment: see above     (R60.9) Peripheral edema  Comment: You have had swelling in your legs which was better in the hospital when you had the compression wraps.  Plan: Peripheral edema (swelling in legs and feet) is common and has many possible causes.  Most common is poor vein circulation.  The blood returning to heart goes through valves that may not work well.  The valves can be impaired from previous injuries to the leg, blood clots, varicose veins and it can be hereditary.    Other causes include lung, heart, kidney, liver, thyroid problems.  Sleep apnea can also cause swelling.  Some medications can make swelling worse.   Things that help the swelling include limiting salt in the diet.   Elevate legs when possible (feet above the level of the pelvis).   Compression stockings are one of the most effective treatments.    If the swelling is not improving on the compression stockings, you could cut down on the amlodipine from 5 current 5 mg to 2.5 mg daily.  Be sure to monitor your blood pressure if you would decrease the dose.    (R91.8) Pulmonary nodules  Comment: see above     (J43.9) Pulmonary emphysema, unspecified emphysema type (H)    (E87.1) Hyponatremia  Comment: Good at the time of discharge.    (I10) Essential hypertension with goal blood pressure less than 140/90

## 2021-03-25 PROBLEM — J44.1 COPD EXACERBATION (H): Status: ACTIVE | Noted: 2021-01-01

## 2021-03-25 PROBLEM — I10 ESSENTIAL HYPERTENSION WITH GOAL BLOOD PRESSURE LESS THAN 140/90: Status: ACTIVE | Noted: 2018-04-06

## 2021-03-25 PROBLEM — E78.5 HYPERLIPIDEMIA LDL GOAL <100: Status: ACTIVE | Noted: 2018-04-06

## 2021-03-25 NOTE — PROVIDER NOTIFICATION
Pt placed on CPAP +5, 30%.  Pt tolerates well at this time.  Pt states he did not like BiPAP on a previous visit.    Our equipment used (V-60).       03/25/21 1700   Tech Time   $Tech Time (10 minute increments) 4   Mode: CPAP/ BiPAP/ AVAPS/ AVAPS AE   CPAP/BiPAP/ AVAPS/ AVAPS AE Mode CPAP   CPAP/BiPAP Patient Parameters   CPAP (cm H2O) 5 cmh2o   RR Total (breaths/min) 25 breaths/min   Vt (mL) 1060 mL   Minute Ventilation (L/min) 9 L/min   Pt.  Leak (L/min) 59 L/min   CPAP/BiPAP/AVAPS/AVAPS AE Patient Assessment   Interface Face Mask - Small   Skin Integrity intact   RT Device Skin Assessment   Oxygen Delivery Device CPAP/BiPAP Mask   Site Appearance neck circumference Clean and dry   Site Appearance bridge of nose Clean and dry   Site Appearance occiput Clean and dry   Strap Tightness Finger Allowance between head and device strap   Skin Interventions Taken No adjustments needed

## 2021-03-25 NOTE — ED NOTES
Increased shortness of breath over the last week and a half.  Patient was hospitalized in January with pneumonia.  Patient denies fever, cough.  Patient stated that he quit smoking after being in the hospital.  Patient has a history of COPD.

## 2021-03-25 NOTE — ED TRIAGE NOTES
Has copd. On 2 l o2 by nc. Having sob and having to increase his o2. States his o2 sats are in the 80s at home.

## 2021-03-25 NOTE — ED PROVIDER NOTES
History     Chief Complaint   Patient presents with     Shortness of Breath     HPI  Camacho Covington is a 60 year old male who presents with history of COPD, hypertension hyperlipidemia tobacco abuse hyponatremia with dyspnea.    Presents with a history of shortness of breath that is been progressive over the last week and feels like he cannot get a deep breath.  He has no significant increased cough or wheezing.  Continues to use his home inhalers.  He has no recent fever.  No congestion.  No significant cough.  Tolerating food fluids no significant chest pain.  Markedly reduced function from an already debilitated status.  Able to lie on a couple of pillows at night.  Sleeps approximately 30 degrees elevated.  Decreased walk distance.  Chronic leg edema that has not changed recently.  Possibly worse on the left than on the right.  No history of congestive  heart failure that he is aware of.      Patient was admitted at this facility in January for left lower lobe pneumonia with sepsis with an acute exacerbation of chronic bronchitis and acute on chronic respiratory failure.  On chronic oxygen 2-1/2 to 3 L.  Significantly debilitated from his COPD.  Required BiPAP for his last exacerbation.   Transferred to [facility] for additional management.  Was on azithromycin and ceftriaxone IV steroids.  Discharged on prednisone taper duo nebs Dulera guaifenesin and albuterol.  He needs to use tobacco but was cautioned to quit.  Presenting sodium was as low as 123 originally normalized by January 8.      Allergies:  No Known Allergies    Problem List:    Patient Active Problem List    Diagnosis Date Noted     Acute on chronic respiratory failure (H) 01/05/2021     Priority: Medium     Pulmonary nodules 11/20/2020     Priority: Medium     CT March, 2019:IMPRESSION:  1.  Centrilobular emphysema.  2. Tiny bilateral noncalcified pulmonary nodules are present.  Follow-up chest CT in 12 months is suggested in  reassessment.       Skin lesion 09/27/2019     Priority: Medium     Hyponatremia 02/17/2019     Priority: Medium     2/17/2019:His sodium remains low but improved some off the hydrochlorothiazide.  He has high urine sodium and urine osmolality so cause may be related to the hydrochlorothiazide but he may have SIADH.  PLAN: Decrease fluids and recheck sodium in 2 weeks.  Stay off hydrochlorothiazide.        Pulmonary emphysema (H) 04/06/2018     Priority: Medium     PFT 4/19/2018 results:FVC=59%, FEV1=23%, FEV1/FVC=30%, DLOC=36%.  IMPRESSION:Severe Airflow Obstruction  -Emphysematous Type, Reversible Component.  Possible extrathoracic obstruction or poor inspiratory effort  PFT 10/16/2019 results:FVC=61%, FEV1=22%, FEV1/FVC=29%, DLOC=unable to do.    The FVC, FEV1 and FEV1/FVC ratio are reduced.  The inspiratory flow rates are reduced.  The FVC is reduced relative to the SVC indicating air trapping.  The TLC, RV, FRC and RV/TLC ratio are all increased indicating overinflation and air trapping.  Following administration of bronchodilators, there is a significant response indicated by the increased FVC.  Severe airway obstruction and overinflation are present.  A clinical trial of bronchodilators may be beneficial in view of the airway obstruction.  IMPRESSION:Severe Airflow Obstruction.   Unable to complete DLCO.  Possible extrathoraacic obstruction.          Essential hypertension with goal blood pressure less than 140/90 04/06/2018     Priority: Medium     Hyperlipidemia LDL goal <100 04/06/2018     Priority: Medium     Tobacco abuse 04/06/2018     Priority: Medium        Past Medical History:    No past medical history on file.    Past Surgical History:    No past surgical history on file.    Family History:    Family History   Problem Relation Age of Onset     Other Cancer Father         lung     Cerebrovascular Disease Father        Social History:  Marital Status:   [2]  Social History     Tobacco Use      "Smoking status: Former Smoker     Packs/day: 0.50     Years: 44.00     Pack years: 22.00     Types: Cigarettes     Quit date: 2021     Years since quittin.2     Smokeless tobacco: Never Used     Tobacco comment: started at age 13,  3-4 cigs daily   Substance Use Topics     Alcohol use: No     Drug use: No        Medications:    albuterol (VENTOLIN HFA) 108 (90 Base) MCG/ACT inhaler  amLODIPine (NORVASC) 5 MG tablet  aspirin 81 MG tablet  atorvastatin (LIPITOR) 20 MG tablet  guaiFENesin (MUCINEX) 600 MG 12 hr tablet  ibuprofen (ADVIL/MOTRIN) 200 MG capsule  ipratropium-albuterol (COMBIVENT RESPIMAT)  MCG/ACT inhaler  lisinopril (ZESTRIL) 20 MG tablet  mometasone-formoterol (DULERA) 200-5 MCG/ACT inhaler  multivitamin, therapeutic with minerals (MULTI-VITAMIN) TABS tablet  nicotine (NICODERM CQ) 21 MG/24HR 24 hr patch  order for DME  predniSONE (DELTASONE) 10 MG tablet          Review of Systems   Constitutional: Negative for chills, diaphoresis and fever.   HENT: Negative for ear pain, sinus pressure and sore throat.    Eyes: Negative for visual disturbance.   Respiratory: Positive for shortness of breath. Negative for cough and wheezing.    Cardiovascular: Positive for leg swelling (chronic). Negative for chest pain and palpitations.   Gastrointestinal: Negative for abdominal pain, blood in stool, constipation, diarrhea, nausea and vomiting.   Genitourinary: Negative for dysuria, frequency and urgency.   Skin: Negative for rash.   Neurological: Negative for headaches.   All other systems reviewed and are negative.      Physical Exam   BP: (!) 169/75  Pulse: 95  Temp: 98.8  F (37.1  C)  Resp: 28  Height: 170.2 cm (5' 7\")  Weight: 60.3 kg (133 lb)      Physical Exam  Constitutional:       General: He is in acute distress.      Appearance: He is not diaphoretic.   Eyes:      Conjunctiva/sclera: Conjunctivae normal.   Neck:      Musculoskeletal: Neck supple.   Cardiovascular:      Rate and Rhythm: Normal " rate.      Heart sounds: No murmur.   Pulmonary:      Effort: Tachypnea and respiratory distress present.      Breath sounds: Examination of the right-upper field reveals decreased breath sounds. Examination of the right-lower field reveals decreased breath sounds. Decreased breath sounds present. No rhonchi or rales.   Abdominal:      General: There is no distension.      Palpations: Abdomen is soft. There is no mass.      Tenderness: There is no abdominal tenderness. There is no guarding.   Musculoskeletal:      Right lower leg: Edema (non-pitting mild feet, ankles) present.      Left lower leg: Edema present.   Skin:     Coloration: Skin is not pale.      Findings: No rash.   Neurological:      General: No focal deficit present.      Mental Status: He is alert.      Motor: No weakness.       Breath sounds are reduced diffusely but particularly on the right side.  Right middle lobe is roughly symmetric with the opposite side left side appears to be decreased.  I see no asymmetric chest movement.  But there is minimal chest movement overall.  Relatively cachectic male appears older than stated age.      ED Course        Procedures                 EKG Interpretation:      Interpreted by Camacho Ramírez MD  EKG done at 1645 hrs. demonstrates a sinus rhythm at 82 bpm normal axis.  No ST change.  No T wave changes.  Normal R progression and no Q waves.  Normal intervals.  Normal conduction.  No ectopy.  Impression sinus rhythm 82 bpm no acute change.      Critical Care time:  none               Results for orders placed or performed during the hospital encounter of 03/25/21 (from the past 24 hour(s))   POC US CHEST B-SCAN    Impression    [facility] Procedure Note      Limited Bedside ED Cardiac Ultrasound:    PROCEDURE: PERFORMED BY: Dr. Camacho Ramírez MD  INDICATIONS/SYMPTOM:  Shortness of Breath  PROBE: Cardiac phased array probe and High frequency linear probe  BODY LOCATION: Chest  FINDINGS:   The  ultrasound was performed utilizing the subcostal, parasternal long axis, parasternal short axis and apical 4 chamber views.  Cardiac contractility:  Present  Gross estimation of cardiac kinesis: hyperkinetic  Pericardial Effusion:  None  RV:LV ratio: Right side more prominent than I would expect ?pulm hypertension  IVC:    Diameter:  IVC diameter expiration (IVCe) 1  cm                                                   IVC diameter inspiration (IVCi) 0 cm                                                       Collapsibility:  IVC collapses > 50% with inspiration  INTERPRETATION:    Chamber size and motion  hypekinetic, Right = left roughly.  No pericardial effusion was found.  IVC visualized and findings indicate hypovolemia??.  IMAGE DOCUMENTATION: Images were archived to PACs system.        [facility] Procedure Note      Limited Bedside ED Ultrasound of Thorax:    PROCEDURE: PERFORMED BY: Dr. Camacho Ramírez MD  INDICATIONS/SYMPTOM:  shortness of breath  PROBE: High frequency linear probe  BODY LOCATION: Chest  FINDINGS:  Images of both lung hemithoracies taken in 2D in multiple rib spaces        Right side:  Lung sliding artifact  Present??     Comet tail artifacts  Absent   Left side:  Lung sliding artifact  Present     Comet tail artifacts  Absent    Pleural effusion: Right side Unable to visualize      Left side Absent    INTERPRETATION: unable to exclude loss of lung sliding right and cannot see right lung base.  IMAGE DOCUMENTATION: Images were archived to PACs system.       Symptomatic Influenza A/B & SARS-CoV2 (COVID-19) Virus PCR Multiplex    Specimen: Nasopharyngeal   Result Value Ref Range    Flu A/B & SARS-COV-2 PCR Source Nasopharyngeal     SARS-CoV-2 PCR Result NEGATIVE     Influenza A PCR Negative NEG^Negative    Influenza B PCR Negative NEG^Negative    Respiratory Syncytial Virus PCR (Note)     Flu A/B & SARS-CoV-2 PCR Comment (Note)    CBC with platelets differential   Result Value Ref Range     WBC 7.0 4.0 - 11.0 10e9/L    RBC Count 3.75 (L) 4.4 - 5.9 10e12/L    Hemoglobin 11.7 (L) 13.3 - 17.7 g/dL    Hematocrit 35.5 (L) 40.0 - 53.0 %    MCV 95 78 - 100 fl    MCH 31.2 26.5 - 33.0 pg    MCHC 33.0 31.5 - 36.5 g/dL    RDW 12.5 10.0 - 15.0 %    Platelet Count 266 150 - 450 10e9/L    Diff Method Automated Method     % Neutrophils 70.5 %    % Lymphocytes 15.9 %    % Monocytes 9.8 %    % Eosinophils 3.0 %    % Basophils 0.4 %    % Immature Granulocytes 0.4 %    Nucleated RBCs 1 (H) 0 /100    Absolute Neutrophil 5.0 1.6 - 8.3 10e9/L    Absolute Lymphocytes 1.1 0.8 - 5.3 10e9/L    Absolute Monocytes 0.7 0.0 - 1.3 10e9/L    Absolute Eosinophils 0.2 0.0 - 0.7 10e9/L    Absolute Basophils 0.0 0.0 - 0.2 10e9/L    Abs Immature Granulocytes 0.0 0 - 0.4 10e9/L    Absolute Nucleated RBC 0.1    Comprehensive metabolic panel   Result Value Ref Range    Sodium 132 (L) 133 - 144 mmol/L    Potassium 4.7 3.4 - 5.3 mmol/L    Chloride 96 94 - 109 mmol/L    Carbon Dioxide 32 20 - 32 mmol/L    Anion Gap 4 3 - 14 mmol/L    Glucose 96 70 - 99 mg/dL    Urea Nitrogen 17 7 - 30 mg/dL    Creatinine 0.44 (L) 0.66 - 1.25 mg/dL    GFR Estimate >90 >60 mL/min/[1.73_m2]    GFR Estimate If Black >90 >60 mL/min/[1.73_m2]    Calcium 9.5 8.5 - 10.1 mg/dL    Bilirubin Total 0.2 0.2 - 1.3 mg/dL    Albumin 3.7 3.4 - 5.0 g/dL    Protein Total 7.7 6.8 - 8.8 g/dL    Alkaline Phosphatase 107 40 - 150 U/L    ALT 26 0 - 70 U/L    AST 20 0 - 45 U/L   Troponin I   Result Value Ref Range    Troponin I ES <0.015 0.000 - 0.045 ug/L   Nt probnp inpatient (BNP)   Result Value Ref Range    N-Terminal Pro BNP Inpatient 83 0 - 900 pg/mL   Blood gas venous   Result Value Ref Range    Ph Venous 7.37 7.32 - 7.43 pH    PCO2 Venous 62 (H) 40 - 50 mm Hg    PO2 Venous 47 25 - 47 mm Hg    Bicarbonate Venous 36 (H) 21 - 28 mmol/L    Base Excess Venous 8.8 mmol/L    FIO2 3    D dimer quantitative   Result Value Ref Range    D Dimer 3.2 (H) 0.0 - 0.50 ug/ml FEU   Troponin I    Result Value Ref Range    Troponin I ES <0.015 0.000 - 0.045 ug/L   XR Chest Port 1 View    Narrative    XR CHEST PORT 1 VW 3/25/2021 5:15 PM    HISTORY: dyspnea    COMPARISON: 1/5/2021 and chest CT on 3/18/2019      Impression    IMPRESSION: New nodular opacities in the left upper lobe could  represent developing pneumonia however a CT after treatment is  recommended to ensure resolution. The previously seen left lower lobe  pneumonia has resolved. Severe emphysema. No pneumothorax. Normal  heart size. Tortuous aorta with atherosclerotic calcifications.    BRETT OCONNOR MD   CT Chest Pulmonary Embolism w Contrast    Narrative    EXAM: CT CHEST PULMONARY EMBOLISM W CONTRAST  LOCATION: [facility]  DATE/TIME: 3/25/2021 6:25 PM    INDICATION: PE suspected, low/intermediate prob, positive D-dimer  COMPARISON: 03/18/2019.  TECHNIQUE: CT chest pulmonary angiogram during arterial phase injection of IV contrast. Multiplanar reformats and MIP reconstructions were performed. Dose reduction techniques were used.   CONTRAST: 80mL [id number]-[id number]    FINDINGS:  ANGIOGRAM CHEST: No evidence for pulmonary embolism. Pulmonary arteries appear mildly dilated. Thoracic aorta normal in caliber. No aortic dissection or other acute abnormality.    HEART: Cardiac chambers within normal limits. No pericardial effusion. Severe coronary artery calcification.    LUNGS AND PLEURA: Severe emphysema.     There are multiple pulmonary nodules:  Stellate and slightly spiculated left upper lobe pulmonary nodule measures 1.2 cm long on sagittal 150, by 1.7 cm AP by 1.3 cm transverse on axial 39.     Superior and posterior to this dominant nodule is a second nodule which measures 9 mm long on sagittal 140, by 11 mm transversely by 9 mm AP on axial image 34.     Within the superior lingula there is a more definitively spiculated nodule measuring 20 mm AP by 9 mm transversely on axial image 71 and 13 mm long on sagittal image 153.     There is  an area of pleural thickening superiorly best seen on sagittal image 154 and axial image 59, which is approximately 10 mm diameter by 36 mm in length.     Slightly nodular focus of consolidation is noted in the inferior lingula anteriorly on axial 90.    In the right lower lobe just above the diaphragm there is a spiculated nodule measuring 14 x 7 mm on axial image 145 x 5 mm long on sagittal 72.     No pleural effusion or pneumothorax.    MEDIASTINUM: There are borderline enlarged mediastinal and hilar lymph nodes, most of which, however, show some degree of coarse calcification consistent with prior granulomatous process.    LIMITED UPPER ABDOMEN: Negative.    MUSCULOSKELETAL: Mild vertebral compression deformities are noted at T6 and T10 with sclerosis along the superior endplate. Diffuse osteopenia. No suspicious bone lesion.      Impression    IMPRESSION:  1.  No evidence for pulmonary embolism.   2.  Severe emphysema.  3.  Multiple new pulmonary nodules primarily in the left lung anyone of which could represent a primary lung cancer. Multifocal lung cancer or alternatively an infectious or inflammatory process are also considered. Correlate clinically for infection.   Management options include pulmonary consultation, short-term follow-up imaging after treatment, percutaneous biopsy considered less viable options given severe emphysema.       Medications - No data to display    Assessments & Plan (with Medical Decision Making)     MDM: Camacho Covington is a 60 year old male who presents with acute onset dyspnea and a patient with chronic oxygen dependent COPD recent hospitalization for pneumonia now with markedly reduced functional status at home today even decreased from his baseline significant debilitation.  No obvious wheezing on exam but has markedly reduced lung sounds throughout.  This appears to be worse on the right side but I see what appears to be a sliding lung sign on this side.  He may have mucous  plugging.  There could be a recurrence of his pneumonia.  We will check for Covid.  He has some nonpitting lower extremity edema that is more chronic and no significant orthopnea although he sleeps an elevated level.  Will check a D-dimer and BNP as well as troponin.  EKG is obtained.  We will at minimum obtain x-ray but may need CT imaging.  Pneumothorax versus bleb is possible as well.  The patient is sitting particularly still not tripoding but appears to be more dyspneic at rest.  Will increase oxygen levels.  Once I see that there is no pneumothorax will trial on BiPAP.  He will need to remain in the hospital.     Patient was markedly improved on CPAP.  This was initiated after the chest x-ray demonstrated no pneumothorax.  The D-dimer was elevated and CT was obtained for PE as well as looking for infiltrate, Covid related changes.  This was reassuring other than showing some scattered nodularity unclear etiology.  We will add after discussing with Justine a CRP and pro calcitonin.  May need additional fungal testing although he is not febrile no cough.  I suspect this is an worsening of his chronic COPD.  Initiated corticosteroids, and inhalers.    I have reviewed the nursing notes.    I have reviewed the findings, diagnosis, plan and need for follow up with the patient.       ED to Inpatient Handoff:    Discussed with Justine   Patient accepted for Inpatient Stay  Pending studies include none  Code Status: Not Addressed           New Prescriptions    No medications on file       Final diagnoses:   Acute on chronic respiratory failure with hypoxia (H)   COPD exacerbation (H)       3/25/2021   [facility] EMERGENCY DEPT     Camacho Ramírez MD  03/25/21 1944

## 2021-03-25 NOTE — ED NOTES
Writer gave patient's daughter status update.  Daughter is going to bring food back for patient for supper.

## 2021-03-26 NOTE — PROGRESS NOTES
"[facility]    Hospitalist Progress Note    Date of Service (when I saw the patient): 03/26/2021    Assessment & Plan   Camacho Covington is a 60 year old male admitted on 3/25/2021. He has history of oxygen dependent COPD, hypertension, hyperlipidemia, hyponatremia, tobacco use.  He presents with 1 and half weeks of increased shortness of breath.     Acute on Chronic Respiratory Failure  1.5 weeks of increased shortness of breath, slight cough. Presented in respiratory distress with poor air movement.  Started on BiPAP initially but did not tolerate so switch to CPAP. On admission exam appears comfortable on CPAP at rest, lungs with poor air movement and faint rhonchi. VS otherwise stable. Labs show compensated respiratory acidosis (pH 7.37 CO2 62), carbon monoxide just slightly elevated doubt this is significant. CT chest shows multiple new pulmonary nodules, no PE or focal infiltrate noted.  Appears to be COPD exacerbation, improving on CPAP and with nebs in ED.  - CPAP weaned off this AM, now on baseline 2.5 LPM  - Will observe overnight due to high risk for deterioration  - COPD management as below     Acute COPD Exacerbation   1.5 weeks of increased shortness of breath, slight cough.  No other infectious symptoms.  Covid negative.  WBC normal.  Baseline O2 requirements of 2.5 L/min at home, needing CPAP in ED with FiO2 of 30%.  As noted above appears to be COPD exacerbation.  - Weaned off CPAP back to baseline 2.5 LPM  - nebulizers: scheduled duonebs Q4 while awake, albuterol prn  - continue home Dulera  - steroids: prednisone 40 mg daily  - Antibiotics: start levofloxacin given nodules as below  - titrate oxygen to O2 Saturation > 89%     Pulmonary nodules  CT chest shows \"Multiple new pulmonary nodules primarily in the left lung anyone of which could represent a primary lung cancer. Multifocal lung cancer or alternatively an infectious or inflammatory process are also considered. " "Correlate clinically for infection. Management options include pulmonary consultation, short-term follow-up imaging after treatment, percutaneous biopsy considered less viable options given severe emphysema.\"  - start levofloxacin  - will need further surveillance and follow up on discharge, discussed with patient  - add on CRP, procalcitonin  - plan to treat COPD for now, consider discussing with pulmonary if not improving     Hypertension  Chronic. Blood pressures reviewed, stable.   - continue home amlodipine, lisinopril     Hyponatremia, possible SIADH  Na 132, mild.  Previously suspected SIADH.  -Follow BMP     Hyperlipidemia  Chronic.  - continue home atorvastatin     Tobacco Use Disorder  No longer smoking of January 2021.  Currently on the middle dose nicotine patch, ordered.     COVID 19 Status: Negative 3/25/21 on admission     Diet: Regular Diet Adult  DVT Prophylaxis: Enoxaparin (Lovenox) SQ  Smith Catheter: not present  Code Status: No CPR- Do NOT Intubate , discussed directly with patient on admission     Disposition: Expected discharge in 1-2 days once respiratory status stable.    Earl Quiroz    Interval History   Patient resting in bed in ICU.  He states his breathing has improved.    -Data reviewed today: I reviewed all new labs and imaging results over the last 24 hours. I personally reviewed no images or EKG's today.    Physical Exam   Temp: 97.6  F (36.4  C) Temp src: Oral BP: 118/63 Pulse: 80   Resp: 20 SpO2: 97 % O2 Device: Nasal cannula Oxygen Delivery: 3 LPM  Vitals:    03/25/21 1603 03/25/21 2128   Weight: 60.3 kg (133 lb) 61.6 kg (135 lb 12.9 oz)     Vital Signs with Ranges  Temp:  [97.3  F (36.3  C)-98.8  F (37.1  C)] 97.6  F (36.4  C)  Pulse:  [] 80  Resp:  [5-58] 20  BP: (111-169)/(48-83) 118/63  FiO2 (%):  [30 %] 30 %  SpO2:  [73 %-98 %] 97 %  I/O last 3 completed shifts:  In: -   Out: 200 [Urine:200]    Gen: cachectic, barrel-chested, alert and oriented x 3, no acute " distressed  HEENT: Atraumatic, normocephalic; sclera non-injected, anicterric; oral mucosa moist, no lesion, no exudate  Lungs: Prolonged expirations, no wheezes or rhonchi or rales  Heart: Regular rate, regular rhythm, no gallops, no rubs, no murmurs  GI: Bowel sound normal, no hepatosplenomegaly, no masses, non-tender, non-distended, no guarding, no rebound tenderness  Lymph: No lymphadenopathy, no edema  Skin: No rashes, no chronic venous stasis     Medications       amLODIPine  5 mg Oral Daily     aspirin  81 mg Oral Daily     atorvastatin  20 mg Oral QAM     enoxaparin ANTICOAGULANT  40 mg Subcutaneous Q24H     fluticasone-vilanterol  1 puff Inhalation Daily     guaiFENesin  600 mg Oral BID     ipratropium - albuterol 0.5 mg/2.5 mg/3 mL  3 mL Nebulization Q4H     levofloxacin  500 mg Oral Q24H     lisinopril  20 mg Oral QAM     nicotine  1 patch Transdermal Daily     nicotine   Transdermal Q8H     predniSONE  40 mg Oral Daily       Data   Recent Labs   Lab 03/26/21  0459 03/25/21  1626   WBC 5.6 7.0   HGB 10.5* 11.7*   MCV 94 95    266   * 132*   POTASSIUM 4.2 4.7   CHLORIDE 97 96   CO2 32 32   BUN 17 17   CR 0.42* 0.44*   ANIONGAP 3 4   JUANA 9.0 9.5   * 96   ALBUMIN  --  3.7   PROTTOTAL  --  7.7   BILITOTAL  --  0.2   ALKPHOS  --  107   ALT  --  26   AST  --  20   TROPI  --  <0.015  <0.015       Recent Results (from the past 24 hour(s))   POC US CHEST B-SCAN    Impression    [facility] Procedure Note      Limited Bedside ED Cardiac Ultrasound:    PROCEDURE: PERFORMED BY: Dr. Camacho Ramírez MD  INDICATIONS/SYMPTOM:  Shortness of Breath  PROBE: Cardiac phased array probe and High frequency linear probe  BODY LOCATION: Chest  FINDINGS:   The ultrasound was performed utilizing the subcostal, parasternal long axis, parasternal short axis and apical 4 chamber views.  Cardiac contractility:  Present  Gross estimation of cardiac kinesis: hyperkinetic  Pericardial Effusion:  None  RV:LV  ratio: Right side more prominent than I would expect ?pulm hypertension  IVC:    Diameter:  IVC diameter expiration (IVCe) 1  cm                                                   IVC diameter inspiration (IVCi) 0 cm                                                       Collapsibility:  IVC collapses > 50% with inspiration  INTERPRETATION:    Chamber size and motion  hypekinetic, Right = left roughly.  No pericardial effusion was found.  IVC visualized and findings indicate hypovolemia??.  IMAGE DOCUMENTATION: Images were archived to PACs system.        [facility] Procedure Note      Limited Bedside ED Ultrasound of Thorax:    PROCEDURE: PERFORMED BY: Dr. Camacho Ramírez MD  INDICATIONS/SYMPTOM:  shortness of breath  PROBE: High frequency linear probe  BODY LOCATION: Chest  FINDINGS:  Images of both lung hemithoracies taken in 2D in multiple rib spaces        Right side:  Lung sliding artifact  Present??     Comet tail artifacts  Absent   Left side:  Lung sliding artifact  Present     Comet tail artifacts  Absent    Pleural effusion: Right side Unable to visualize      Left side Absent    INTERPRETATION: unable to exclude loss of lung sliding right and cannot see right lung base.  IMAGE DOCUMENTATION: Images were archived to PACs system.       XR Chest Port 1 View    Narrative    XR CHEST PORT 1 VW 3/25/2021 5:15 PM    HISTORY: dyspnea    COMPARISON: 1/5/2021 and chest CT on 3/18/2019      Impression    IMPRESSION: New nodular opacities in the left upper lobe could  represent developing pneumonia however a CT after treatment is  recommended to ensure resolution. The previously seen left lower lobe  pneumonia has resolved. Severe emphysema. No pneumothorax. Normal  heart size. Tortuous aorta with atherosclerotic calcifications.    BRETT OCONNOR MD   CT Chest Pulmonary Embolism w Contrast    Narrative    EXAM: CT CHEST PULMONARY EMBOLISM W CONTRAST  LOCATION: [facility]  DATE/TIME: 3/25/2021 6:25  PM    INDICATION: PE suspected, low/intermediate prob, positive D-dimer  COMPARISON: 03/18/2019.  TECHNIQUE: CT chest pulmonary angiogram during arterial phase injection of IV contrast. Multiplanar reformats and MIP reconstructions were performed. Dose reduction techniques were used.   CONTRAST: 80mL Isovue-[id number]    FINDINGS:  ANGIOGRAM CHEST: No evidence for pulmonary embolism. Pulmonary arteries appear mildly dilated. Thoracic aorta normal in caliber. No aortic dissection or other acute abnormality.    HEART: Cardiac chambers within normal limits. No pericardial effusion. Severe coronary artery calcification.    LUNGS AND PLEURA: Severe emphysema.     There are multiple pulmonary nodules:  Stellate and slightly spiculated left upper lobe pulmonary nodule measures 1.2 cm long on sagittal 150, by 1.7 cm AP by 1.3 cm transverse on axial 39.     Superior and posterior to this dominant nodule is a second nodule which measures 9 mm long on sagittal 140, by 11 mm transversely by 9 mm AP on axial image 34.     Within the superior lingula there is a more definitively spiculated nodule measuring 20 mm AP by 9 mm transversely on axial image 71 and 13 mm long on sagittal image 153.     There is an area of pleural thickening superiorly best seen on sagittal image 154 and axial image 59, which is approximately 10 mm diameter by 36 mm in length.     Slightly nodular focus of consolidation is noted in the inferior lingula anteriorly on axial 90.    In the right lower lobe just above the diaphragm there is a spiculated nodule measuring 14 x 7 mm on axial image 145 x 5 mm long on sagittal 72.     No pleural effusion or pneumothorax.    MEDIASTINUM: There are borderline enlarged mediastinal and hilar lymph nodes, most of which, however, show some degree of coarse calcification consistent with prior granulomatous process.    LIMITED UPPER ABDOMEN: Negative.    MUSCULOSKELETAL: Mild vertebral compression deformities are noted at T6 and  T10 with sclerosis along the superior endplate. Diffuse osteopenia. No suspicious bone lesion.      Impression    IMPRESSION:  1.  No evidence for pulmonary embolism.   2.  Severe emphysema.  3.  Multiple new pulmonary nodules primarily in the left lung anyone of which could represent a primary lung cancer. Multifocal lung cancer or alternatively an infectious or inflammatory process are also considered. Correlate clinically for infection.   Management options include pulmonary consultation, short-term follow-up imaging after treatment, percutaneous biopsy considered less viable options given severe emphysema.

## 2021-03-26 NOTE — H&P
"[facility]    History and Physical - Hospitalist Service       Date of Admission:  3/25/2021    Assessment & Plan   Camacho Covington is a 60 year old male admitted on 3/25/2021. He has history of oxygen dependent COPD, hypertension, hyperlipidemia, hyponatremia, tobacco use.  He presents with 1 and half weeks of increased shortness of breath.    Acute on Chronic Respiratory Failure  1.5 weeks of increased shortness of breath, slight cough. Presented in respiratory distress with poor air movement.  Started on BiPAP initially but did not tolerate so switch to CPAP. On admission exam appears comfortable on CPAP at rest, lungs with poor air movement and faint rhonchi. VS otherwise stable. Labs show compensated respiratory acidosis (pH 7.37 CO2 62), carbon monoxide just slightly elevated doubt this is significant. CT chest shows multiple new pulmonary nodules, no PE or focal infiltrate noted.  Appears to be COPD exacerbation, improving on CPAP and with nebs in ED.  - continue CPAP support (did not tolerate BiPAP)   - AM VBG  - COPD management as below    Acute COPD Exacerbation   1.5 weeks of increased shortness of breath, slight cough.  No other infectious symptoms.  Covid negative.  WBC normal.  Baseline O2 requirements of 2.5 L/min at home, needing CPAP in ED with FiO2 of 30%.  As noted above appears to be COPD exacerbation.  -Continue CPAP  - follow CBC, BMP, VBG  - nebulizers: scheduled duonebs Q4 while awake, albuterol prn  - continue home Dulera  - steroids: prednisone 40 mg daily  - Antibiotics: start levofloxacin given nodules as below  - titrate oxygen to O2 Saturation > 89%    Pulmonary nodules  CT chest shows \"Multiple new pulmonary nodules primarily in the left lung anyone of which could represent a primary lung cancer. Multifocal lung cancer or alternatively an infectious or inflammatory process are also considered. Correlate clinically for infection. Management options include " "pulmonary consultation, short-term follow-up imaging after treatment, percutaneous biopsy considered less viable options given severe emphysema.\"  - start levofloxacin  - will need further surveillance and follow up on discharge, discussed with patient  - add on CRP, procalcitonin  - plan to treat COPD for now, consider discussing with pulmonary if not improving    Hypertension  Chronic. Blood pressures reviewed, stable.   - continue home amlodipine, lisinopril    Hyponatremia, possible SIADH  Na 132, mild.  Previously suspected SIADH.  -Follow BMP    Hyperlipidemia  Chronic.  - continue home atorvastatin    Tobacco Use Disorder  No longer smoking of January 2021.  Currently on the middle dose nicotine patch, ordered.    COVID 19 Status: Negative 3/25/21 on admission     Diet: Regular Diet Adult  DVT Prophylaxis: Enoxaparin (Lovenox) SQ  Smith Catheter: not present  Code Status: No CPR- Do NOT Intubate , discussed directly with patient on admission    Disposition Plan   Expected discharge: 3-4 days, recommended to prior living arrangement once Respiratory status stable.  Entered: Shantel Haque PA-C 03/25/2021, 10:56 PM     The patient's care was discussed with the Attending Physician, Dr. Norman Gonzalez and Patient.    Shantel Haque PA-C  [facility]  Contact information available via [facility] Paging/Directory  ______________________________________________________________________    Chief Complaint   Shortness of breath    History is obtained from the patient    History of Present Illness   Camacho Covington is a 60 year old male who presents with shortness of breath.    About a week and half ago patient noticed that he was feeling more short of breath with activities.  He typically is on 2.5 L of oxygen at home and has an oxygenation in the mid 90 percentile.  He states that he was doing housework or walking he noted that his oxygenation would slightly dip.  Over the past week this " progressively worsened to the point where today he felt quite short of breath just with getting up to brush his teeth.  He notes that his oxygenation drops quite low and with his hypoxia he feels generally unwell.  Outside of episodes of hypoxia, he does not feel ill.  He denies any fevers, chills, nasal congestion, sore throat, headache, poor appetite, nausea, vomiting or abdominal complaints.  He does have a very occasional cough that is nonproductive.     He wonders about carbon monoxide exposure in his home however he does not have any specific concern for exposure.    Review of Systems    The 10 point Review of Systems is negative other than noted in the HPI or here.     Past Medical History    I have reviewed this patient's medical history and updated it with pertinent information if needed.   Past Medical History:   Diagnosis Date     Essential hypertension with goal blood pressure less than 140/90 4/6/2018     Hyperlipidemia LDL goal <100 4/6/2018     Hyponatremia 2/17/2019 2/17/2019:His sodium remains low but improved some off the hydrochlorothiazide. He has high urine sodium and urine osmolality so cause may be related to the hydrochlorothiazide but he may have SIADH. PLAN: Decrease fluids and recheck sodium in 2 weeks.  Stay off hydrochlorothiazide.      Pulmonary emphysema (H) 4/6/2018    PFT 4/19/2018 results:FVC=59%, FEV1=23%, FEV1/FVC=30%, DLOC=36%. IMPRESSION:Severe Airflow Obstruction  -Emphysematous Type, Reversible Component.  Possible extrathoracic obstruction or poor inspiratory effort PFT 10/16/2019 results:FVC=61%, FEV1=22%, FEV1/FVC=29%, DLOC=unable to do.   The FVC, FEV1 and FEV1/FVC ratio are reduced.  The inspiratory flow rates are reduced.  The FVC is reduced relati     Tobacco abuse 4/6/2018       Past Surgical History   I have reviewed this patient's surgical history and updated it with pertinent information if needed.  History reviewed. No pertinent surgical history.    Social  History   I have reviewed this patient's social history and updated it with pertinent information if needed.  Social History     Tobacco Use     Smoking status: Former Smoker     Packs/day: 0.50     Years: 44.00     Pack years: 22.00     Types: Cigarettes     Quit date: 2021     Years since quittin.2     Smokeless tobacco: Never Used     Tobacco comment: started at age 13,  3-4 cigs daily   Substance Use Topics     Alcohol use: No     Drug use: No       Family History   I have reviewed this patient's family history and updated it with pertinent information if needed.  Family History   Problem Relation Age of Onset     Other Cancer Father         lung     Cerebrovascular Disease Father        Prior to Admission Medications   Prior to Admission Medications   Prescriptions Last Dose Informant Patient Reported? Taking?   albuterol (VENTOLIN HFA) 108 (90 Base) MCG/ACT inhaler 3/25/2021 at 1830 Spouse/Significant Other No Yes   Sig: INHALE 2 PUFFS INTO THE LUNGS EVERY 4 HOURS AS NEEDED FOR SHORTNESS OF BREATH, DIFFICULTY BREATHING OR WHEEZING.   amLODIPine (NORVASC) 5 MG tablet 3/25/2021 at 0900 Spouse/Significant Other No Yes   Sig: TAKE ONE TABLET BY MOUTH ONCE DAILY   Patient taking differently: Take 5 mg by mouth every morning    aspirin 81 MG tablet 3/25/2021 at 0900 Spouse/Significant Other Yes Yes   Sig: Take 81 mg by mouth every morning    atorvastatin (LIPITOR) 20 MG tablet 3/25/2021 at 0900 Spouse/Significant Other No Yes   Sig: Take 1 tablet (20 mg) by mouth daily   Patient taking differently: Take 20 mg by mouth every morning    guaiFENesin (MUCINEX) 600 MG 12 hr tablet 3/25/2021 at 0900  No Yes   Sig: Take 1 tablet (600 mg) by mouth 2 times daily   ibuprofen (ADVIL/MOTRIN) 200 MG capsule 3/25/2021 at 0900  No Yes   Sig: Take 2 capsules (400 mg) by mouth every 4 hours as needed for fever ; do not use while taking both prednisone and aspirin.   ipratropium-albuterol (COMBIVENT RESPIMAT)  MCG/ACT  inhaler 3/25/2021 at 1500 Spouse/Significant Other No Yes   Sig: INHALE ONE PUFF BY MOUTH FOUR TIMES A DAY DUE FOR PHYSICAL   Patient taking differently: Inhale 1 puff into the lungs 4 times daily INHALE ONE PUFF BY MOUTH FOUR TIMES A DAY DUE FOR PHYSICAL   lisinopril (ZESTRIL) 20 MG tablet 3/25/2021 at 0900 Spouse/Significant Other No Yes   Sig: Take 1 tablet (20 mg) by mouth daily   Patient taking differently: Take 20 mg by mouth every morning    mometasone-formoterol (DULERA) 200-5 MCG/ACT inhaler 3/25/2021 at Unknown time Spouse/Significant Other No Yes   Sig: Inhale 2 puffs into the lungs 2 times daily   multivitamin, therapeutic with minerals (MULTI-VITAMIN) TABS tablet 3/25/2021 at 0900 Spouse/Significant Other Yes Yes   Sig: Take 1 tablet by mouth every morning    nicotine (NICODERM CQ) 21 MG/24HR 24 hr patch 3/25/2021 at Unknown time  No Yes   Sig: PLACE ONE 21 MG NICOTINE PATCHES ONCE DAILY (EVERY 24 HOURS) FOR 6 TO 8 WEEKS, THEN USE 14 MG PATCHES ONCE DAILY FOR 2 WEEKS AND FINALLY USE 7 MG PATCHES ONCE DAILY FOR 2 WEEKS.   order for DME 3/25/2021 at Unknown time Spouse/Significant Other No Yes   Sig: Equipment being ordered: Oxygen with portability. 2 liters per nasal cannula continuous.      Facility-Administered Medications: None     Allergies   No Known Allergies    Physical Exam   Vital Signs: Temp: 97.3  F (36.3  C) Temp src: Oral BP: (!) 143/80 Pulse: 88   Resp: 16 SpO2: 93 % O2 Device: Nasal cannula Oxygen Delivery: 3 LPM  Weight: 135 lbs 12.85 oz    Constitutional: Sitting up comfortably in bed on CPAP, awake, alert, cooperative, no apparent distress, and appears stated age  Respiratory: No increased work of breathing, poor air exchange, prolonged expiratory phase, bilateral rhonchi in the lower lobes, very faint wheeze.  No crackles.  Cardiovascular:  regular rate and rhythm, and no murmur noted.  No lower extremity edema.  GI: normal bowel sounds, soft, non-distended,  non-tender  Genitounirinary: Deferred  Skin: normal skin color, texture, turgor  Musculoskeletal: Normal bulk and tone, moves all 4 extremities appropriately.  Neurologic: Awake, alert, oriented to name, place and time.    Neuropsychiatric: Calm, pleasant, cooperative.  Appropriate thought process and content.  Short-term memory is grossly intact.    Data   Data reviewed today: I reviewed all medications, new labs and imaging results over the last 24 hours. I personally reviewed no images or EKG's today.    Recent Labs   Lab 03/25/21  1626   WBC 7.0   HGB 11.7*   MCV 95      *   POTASSIUM 4.7   CHLORIDE 96   CO2 32   BUN 17   CR 0.44*   ANIONGAP 4   JUANA 9.5   GLC 96   ALBUMIN 3.7   PROTTOTAL 7.7   BILITOTAL 0.2   ALKPHOS 107   ALT 26   AST 20   TROPI <0.015  <0.015     Recent Results (from the past 24 hour(s))   POC US CHEST B-SCAN    Impression    [facility] Procedure Note      Limited Bedside ED Cardiac Ultrasound:    PROCEDURE: PERFORMED BY: Dr. Camacho Ramírez MD  INDICATIONS/SYMPTOM:  Shortness of Breath  PROBE: Cardiac phased array probe and High frequency linear probe  BODY LOCATION: Chest  FINDINGS:   The ultrasound was performed utilizing the subcostal, parasternal long axis, parasternal short axis and apical 4 chamber views.  Cardiac contractility:  Present  Gross estimation of cardiac kinesis: hyperkinetic  Pericardial Effusion:  None  RV:LV ratio: Right side more prominent than I would expect ?pulm hypertension  IVC:    Diameter:  IVC diameter expiration (IVCe) 1  cm                                                   IVC diameter inspiration (IVCi) 0 cm                                                       Collapsibility:  IVC collapses > 50% with inspiration  INTERPRETATION:    Chamber size and motion  hypekinetic, Right = left roughly.  No pericardial effusion was found.  IVC visualized and findings indicate hypovolemia??.  IMAGE DOCUMENTATION: Images were archived to PACs system.         [facility] Procedure Note      Limited Bedside ED Ultrasound of Thorax:    PROCEDURE: PERFORMED BY: Dr. Camacho Ramírez MD  INDICATIONS/SYMPTOM:  shortness of breath  PROBE: High frequency linear probe  BODY LOCATION: Chest  FINDINGS:  Images of both lung hemithoracies taken in 2D in multiple rib spaces        Right side:  Lung sliding artifact  Present??     Comet tail artifacts  Absent   Left side:  Lung sliding artifact  Present     Comet tail artifacts  Absent    Pleural effusion: Right side Unable to visualize      Left side Absent    INTERPRETATION: unable to exclude loss of lung sliding right and cannot see right lung base.  IMAGE DOCUMENTATION: Images were archived to PACs system.       XR Chest Port 1 View    Narrative    XR CHEST PORT 1 VW 3/25/2021 5:15 PM    HISTORY: dyspnea    COMPARISON: 1/5/2021 and chest CT on 3/18/2019      Impression    IMPRESSION: New nodular opacities in the left upper lobe could  represent developing pneumonia however a CT after treatment is  recommended to ensure resolution. The previously seen left lower lobe  pneumonia has resolved. Severe emphysema. No pneumothorax. Normal  heart size. Tortuous aorta with atherosclerotic calcifications.    BRETT OCONNOR MD   CT Chest Pulmonary Embolism w Contrast    Narrative    EXAM: CT CHEST PULMONARY EMBOLISM W CONTRAST  LOCATION: [facility]  DATE/TIME: 3/25/2021 6:25 PM    INDICATION: PE suspected, low/intermediate prob, positive D-dimer  COMPARISON: 03/18/2019.  TECHNIQUE: CT chest pulmonary angiogram during arterial phase injection of IV contrast. Multiplanar reformats and MIP reconstructions were performed. Dose reduction techniques were used.   CONTRAST: 80mL Isovue-370    FINDINGS:  ANGIOGRAM CHEST: No evidence for pulmonary embolism. Pulmonary arteries appear mildly dilated. Thoracic aorta normal in caliber. No aortic dissection or other acute abnormality.    HEART: Cardiac chambers within normal limits.  No pericardial effusion. Severe coronary artery calcification.    LUNGS AND PLEURA: Severe emphysema.     There are multiple pulmonary nodules:  Stellate and slightly spiculated left upper lobe pulmonary nodule measures 1.2 cm long on sagittal 150, by 1.7 cm AP by 1.3 cm transverse on axial 39.     Superior and posterior to this dominant nodule is a second nodule which measures 9 mm long on sagittal 140, by 11 mm transversely by 9 mm AP on axial image 34.     Within the superior lingula there is a more definitively spiculated nodule measuring 20 mm AP by 9 mm transversely on axial image 71 and 13 mm long on sagittal image 153.     There is an area of pleural thickening superiorly best seen on sagittal image 154 and axial image 59, which is approximately 10 mm diameter by 36 mm in length.     Slightly nodular focus of consolidation is noted in the inferior lingula anteriorly on axial 90.    In the right lower lobe just above the diaphragm there is a spiculated nodule measuring 14 x 7 mm on axial image 145 x 5 mm long on sagittal 72.     No pleural effusion or pneumothorax.    MEDIASTINUM: There are borderline enlarged mediastinal and hilar lymph nodes, most of which, however, show some degree of coarse calcification consistent with prior granulomatous process.    LIMITED UPPER ABDOMEN: Negative.    MUSCULOSKELETAL: Mild vertebral compression deformities are noted at T6 and T10 with sclerosis along the superior endplate. Diffuse osteopenia. No suspicious bone lesion.      Impression    IMPRESSION:  1.  No evidence for pulmonary embolism.   2.  Severe emphysema.  3.  Multiple new pulmonary nodules primarily in the left lung anyone of which could represent a primary lung cancer. Multifocal lung cancer or alternatively an infectious or inflammatory process are also considered. Correlate clinically for infection.   Management options include pulmonary consultation, short-term follow-up imaging after treatment,  percutaneous biopsy considered less viable options given severe emphysema.

## 2021-03-26 NOTE — PROGRESS NOTES
"[place] [facility] ADMISSION NOTE    Patient admitted to room 7 at approximately 2125 via cart from emergency room. Patient was accompanied by transport tech and nurse.     Verbal SBAR report received from Darshana CRAIG prior to patient arrival.     Patient trasferred to bed via self. Patient alert and oriented X 3. The patient is not having any pain.  . Admission vital signs: Blood pressure (!) 143/80, pulse 88, temperature 97.3  F (36.3  C), temperature source Oral, resp. rate 16, height 1.702 m (5' 7\"), weight 61.6 kg (135 lb 12.9 oz), SpO2 93 %. Patient was oriented to plan of care, call light, bed controls, tv, telephone, bathroom and visiting hours.     Risk Assessment    The following safety risks were identified during admission: fall. Yellow risk band applied: YES.     Skin Initial Assessment    This writer admitted this patient and completed a full skin assessment and Rayo score in the Adult PCS flowsheet. Appropriate interventions initiated as needed.     Secondary skin check completed by Magaly CRAIG.           Ericka Rojas RN    "

## 2021-03-26 NOTE — PLAN OF CARE
Patient voided 50 ml in urinal.  Stated he did not feel as if he needed to void more.  VSS.  Breath sounds diminished, O2 sats 92 - 97% on O2 via NC @ 3L.  VSS.  Denies any discomfort.  Will monitor need to void and bladder scan if necessary.

## 2021-03-26 NOTE — PROGRESS NOTES
Pt is A&O and denies having any pain. He has an unsteady gait, but declined to use the walker, requires an assist of 1 for ambulation. Pt has an infrequent, congested, non productive cough and his lung sounds are diminished to all acuna. Pt wore the CPAP for approx 4 hrs, when off the CPAP he was on 3 Lpm NC his oxygen >90%, with rare intermittent O2 sat decreasing to 88%, but recovers within a few seconds.

## 2021-03-26 NOTE — PLAN OF CARE
"Pt is alert and oriented x4 for shift. He is up with assist x1. Vitals sign stable. Requires 3L NC. Pt is using accessory muscles and is dyspneic upon exertion. Pt was unable to void more than 25cc this shift. Bladder scanned for 916ml and order obtained for straight cath. 950ml from straight cath. Pt felt immediately relief. Pt states he feels great. Denies any needs at this time. /67   Pulse 84   Temp 98  F (36.7  C)   Resp 13   Ht 1.702 m (5' 7\")   Wt 61.6 kg (135 lb 12.9 oz)   SpO2 96%   BMI 21.27 kg/m      "

## 2021-03-26 NOTE — UTILIZATION REVIEW
Admission Status; Secondary Review Determination       Under the authority of the Utilization Management Committee, the utilization review process indicated a secondary review on the above patient. The review outcome is based on review of the medical records, discussions with staff, and applying clinical experience noted on the date of the review.     (x) Inpatient Status Appropriate - This patient's medical care is consistent with medical management for inpatient care and reasonable inpatient medical practice.     RATIONALE FOR DETERMINATION   59 yo man with severe emphysema and COPD with chronic oxygen dependence, hypertension, dyslipidemia, chronic hyponatremia, ongoing tobacco abuse who presented with acute on chronic shortness of breath.  He was admitted to the intensive care unit for acute on chronic respiratory failure requiring near continuous CPAP versus BiPAP as he was able to tolerate.  Respiratory rate up to 33.  D-dimer elevated prompting a chest CT which was negative for pulmonary embolism bu showed left upper lobe infiltrates, resolution of prior left lower lobe infiltrates from prior pneumonia approximately 2 months ago.  He is on scheduled DuoNebs every 4 hours.  Labs show respiratory acidosis.  He has already been in the hospital 1 midnight and will need at least 1 additional night.  He remains in the intensive care unit.    At the time of admission with the information available to the attending physician more than 2 nights hospital complex care was anticipated, based on patient risk of adverse outcome if treated as outpatient and complex care required. Inpatient admission is appropriate based on the Medicare guidelines.     This document was produced using voice recognition software.    The information on this document is developed by the utilization review team in order for the business office to ensure compliance. This only denotes the appropriateness of proper admission status and does not  reflect the quality of care rendered.   The definitions of Inpatient Status and Observation Status used in making the determination above are those provided in the CMS Coverage Manual, Chapter 1 and Chapter 6, section 70.4.     Sincerely,   Martha Middleton MD  Utilization Review  Physician Advisor  [facility].

## 2021-03-27 NOTE — PLAN OF CARE
Smith inserted per MD for discharge.  16 Fr Coude inserted without difficulty with immediate return of 500 ml clear yellow urine.  Will instruct on home care and leg bag.

## 2021-03-27 NOTE — PLAN OF CARE
Mild respiratory insufficiency with activity, treating with supplemental oxygen at 1.5-2 L nasal cannula, O2 saturation at 90-94%. Pt noted been shaky and  reported R leg twitching(resoled in few minutes per pt report). Pt has been provided education and side effect of steroids, electrolytes reviewed. An order for one tome PO lorazepam has been obtained and given to  the patient in order to deduce anxiety with the good effect.Pt has been wearing CPAP all night with minimum discomfort.Pt has been experiencing oliguria (50 ml at one time,)bladder scan performed at the beginning of the shift and straight cath  for 850 ml.Pt has been bladder scanned in the morning and straight cath for 700 ml.

## 2021-03-27 NOTE — PLAN OF CARE
[place] [facility] DISCHARGE NOTE    Patient discharged to home at 12:21 PM via wheel chair. Accompanied by spouse and staff. Discharge instructions reviewed with patient, opportunity offered to ask questions. Prescriptions sent to patients preferred pharmacy. All belongings sent with patient.    Kusum Bhandari RN

## 2021-03-27 NOTE — DISCHARGE SUMMARY
[facility]  Hospitalist Discharge Summary       Date of Admission:  3/25/2021  Date of Discharge:  3/27/2021 12:23 PM  Discharging Provider: Earl Quiroz MD      Discharge Diagnoses     Acute on Chronic Respiratory Failure  Acute COPD Exacerbation   Pulmonary nodules  Hypertension  Hyponatremia, possible SIADH  Hyperlipidemia  Tobacco Use Disorder  COVID 19 Status: Negative    Follow-ups Needed After Discharge   Follow-up Appointments     Follow-up and recommended labs and tests      Follow up with primary care provider, Theo Ponce, within 7 days for   hospital follow- up.  The following labs/tests are recommended: BMP and   CBC.           Discharge Disposition   Discharged to home  Condition at discharge: Stable    Hospital Course   Camacho Covington is a 60 year old male admitted on 3/25/2021. He has history of oxygen dependent COPD, hypertension, hyperlipidemia, hyponatremia, tobacco use.  He presents with 1 and half weeks of increased shortness of breath.     Acute on Chronic Respiratory Failure  1.5 weeks of increased shortness of breath, slight cough. Presented in respiratory distress with poor air movement.  Started on BiPAP initially but did not tolerate so switch to CPAP. On admission exam appears comfortable on CPAP at rest, lungs with poor air movement and faint rhonchi. VS otherwise stable. Labs show compensated respiratory acidosis (pH 7.37 CO2 62), carbon monoxide just slightly elevated doubt this is significant. CT chest shows multiple new pulmonary nodules, no PE or focal infiltrate noted.  Appears to be COPD exacerbation, improving on CPAP and with nebs in ED.  - Weaned off CPAP AM of 3/26, remained off overnight  - Remains on baseline 2-3 LPM NC  - COPD management as below     Acute COPD Exacerbation   1.5 weeks of increased shortness of breath, slight cough.  No other infectious symptoms.  Covid negative.  WBC normal.  Baseline O2 requirements of 2.5 L/min at  "home, needing CPAP in ED with FiO2 of 30%.  As noted above appears to be COPD exacerbation.  - Treated with scheduled duonebs Q4 while awake, albuterol prn  - Continue home Dulera  - Prednisone 40 mg daily, discharged on 8 day taper  - Antibiotics: start levofloxacin given nodules as below, discharged on brief course of Levaquin  - Patient to resume PTA Combivent qid and albuterol prn     Pulmonary nodules  CT chest shows \"Multiple new pulmonary nodules primarily in the left lung anyone of which could represent a primary lung cancer. Multifocal lung cancer or alternatively an infectious or inflammatory process are also considered. Correlate clinically for infection. Management options include pulmonary consultation, short-term follow-up imaging after treatment, percutaneous biopsy considered less viable options given severe emphysema.\"  - Will need further surveillance and follow up on discharge, discussed with patient     Hypertension  Chronic. Blood pressures reviewed, stable.   - Continue home amlodipine, lisinopril     Hyponatremia, possible SIADH  Na 132, mild.  Previously suspected SIADH.  -Na 134 on day of discharge     Hyperlipidemia  Chronic.  - Continue home atorvastatin     Tobacco Use Disorder  No longer smoking of January 2021.  Currently on the middle dose nicotine patch, ordered.     COVID 19 Status: Negative 3/25/21 on admission    Consultations This Hospital Stay   None    Code Status   No CPR- Do NOT Intubate    Time Spent on this Encounter   I, Earl Quiroz MD, personally saw the patient today and spent greater than 30 minutes discharging this patient.       Earl Quiroz MD  [facility]  ______________________________________________________________________    Physical Exam   Vital Signs: Temp: 97.9  F (36.6  C) Temp src: Oral BP: (!) 122/95 Pulse: 83   Resp: 14 SpO2: 92 % O2 Device: Nasal cannula Oxygen Delivery: 2 LPM  Weight: 136 lbs .38 oz       Gen: " cachectic, barrel-chested, alert and oriented x 3, no acute distressed  HEENT: Atraumatic, normocephalic; sclera non-injected, anicterric; oral mucosa moist, no lesion, no exudate  Lungs: Prolonged expirations, no wheezes or rhonchi or rales  Heart: Regular rate, regular rhythm, no gallops, no rubs, no murmurs  GI: Bowel sound normal, no hepatosplenomegaly, no masses, non-tender, non-distended, no guarding, no rebound tenderness  Lymph: No lymphadenopathy, no edema  Skin: No rashes, no chronic venous stasis     Primary Care Physician   Theo Ponce    Discharge Orders      UROLOGY ADULT REFERRAL      Reason for your hospital stay    This is a 60 year old male admitted with a COPD exacerbation.     Follow-up and recommended labs and tests    Follow up with primary care provider, Theo Ponce, within 7 days for hospital follow- up.  The following labs/tests are recommended: BMP and CBC.     Activity    Your activity upon discharge: activity as tolerated     Tubes and drains    You are going home with the following tubes or drains: sams catheter.  Tube cares per hospital or home care instructions     Oxygen Adult/Peds    Oxygen Documentation:   I certify that this patient, Camacho Covington has been under my care (or a nurse practitioner or physican's assistant working with me). This is the face-to-face encounter for oxygen medical necessity.      Camacho Covington is now in a chronic stable state and continues to require supplemental oxygen. Patient has continued oxygen desaturation due to COPD J44.9.    Alternative treatment(s) tried or considered and deemed clinically infective for treatment of COPD J44.9 include nebulizers, inhalers, steroids and pulmonary toileting.  If portability is ordered, is the patient mobile within the home? yes    **Patients who qualify for home O2 coverage under the CMS guidelines require ABG tests or O2 sat readings obtained closest to, but no earlier than 2 days prior to the  discharge, as evidence of the need for home oxygen therapy. Testing must be performed while patient is in the chronic stable state. See notes for O2 sats.**     Diet    Follow this diet upon discharge: Orders Placed This Encounter      Regular Diet Adult       Significant Results and Procedures   Most Recent 3 CBC's:  Recent Labs   Lab Test 03/26/21  0459 03/25/21  1626 01/08/21  0659 01/07/21  0703   WBC 5.6 7.0  --  14.9*   HGB 10.5* 11.7*  --  11.1*   MCV 94 95  --  96    266 257 268     Most Recent 3 BMP's:  Recent Labs   Lab Test 03/27/21  0421 03/26/21  0459 03/25/21  1626    132* 132*   POTASSIUM 4.2 4.2 4.7   CHLORIDE 98 97 96   CO2 31 32 32   BUN 17 17 17   CR 0.44* 0.42* 0.44*   ANIONGAP 5 3 4   JUANA 8.8 9.0 9.5   GLC 99 149* 96     Most Recent 6 Bacteria Isolates From Any Culture (See EPIC Reports for Culture Details):  Recent Labs   Lab Test 01/05/21  0448 01/05/21  0340   CULT No growth No growth   ,   Results for orders placed or performed during the hospital encounter of 03/25/21   XR Chest Port 1 View    Narrative    XR CHEST PORT 1 VW 3/25/2021 5:15 PM    HISTORY: dyspnea    COMPARISON: 1/5/2021 and chest CT on 3/18/2019      Impression    IMPRESSION: New nodular opacities in the left upper lobe could  represent developing pneumonia however a CT after treatment is  recommended to ensure resolution. The previously seen left lower lobe  pneumonia has resolved. Severe emphysema. No pneumothorax. Normal  heart size. Tortuous aorta with atherosclerotic calcifications.    BRETT OCONNOR MD   POC US CHEST B-SCAN    Impression    [facility] Procedure Note      Limited Bedside ED Cardiac Ultrasound:    PROCEDURE: PERFORMED BY: Dr. Camacho Ramírez MD  INDICATIONS/SYMPTOM:  Shortness of Breath  PROBE: Cardiac phased array probe and High frequency linear probe  BODY LOCATION: Chest  FINDINGS:   The ultrasound was performed utilizing the subcostal, parasternal long axis, parasternal short axis  and apical 4 chamber views.  Cardiac contractility:  Present  Gross estimation of cardiac kinesis: hyperkinetic  Pericardial Effusion:  None  RV:LV ratio: Right side more prominent than I would expect ?pulm hypertension  IVC:    Diameter:  IVC diameter expiration (IVCe) 1  cm                                                   IVC diameter inspiration (IVCi) 0 cm                                                       Collapsibility:  IVC collapses > 50% with inspiration  INTERPRETATION:    Chamber size and motion  hypekinetic, Right = left roughly.  No pericardial effusion was found.  IVC visualized and findings indicate hypovolemia??.  IMAGE DOCUMENTATION: Images were archived to PACs system.        [facility] Procedure Note      Limited Bedside ED Ultrasound of Thorax:    PROCEDURE: PERFORMED BY: Dr. Camacho Ramírez MD  INDICATIONS/SYMPTOM:  shortness of breath  PROBE: High frequency linear probe  BODY LOCATION: Chest  FINDINGS:  Images of both lung hemithoracies taken in 2D in multiple rib spaces        Right side:  Lung sliding artifact  Present??     Comet tail artifacts  Absent   Left side:  Lung sliding artifact  Present     Comet tail artifacts  Absent    Pleural effusion: Right side Unable to visualize      Left side Absent    INTERPRETATION: unable to exclude loss of lung sliding right and cannot see right lung base.  IMAGE DOCUMENTATION: Images were archived to PACs system.       CT Chest Pulmonary Embolism w Contrast    Narrative    EXAM: CT CHEST PULMONARY EMBOLISM W CONTRAST  LOCATION: [facility]  DATE/TIME: 3/25/2021 6:25 PM    INDICATION: PE suspected, low/intermediate prob, positive D-dimer  COMPARISON: 03/18/2019.  TECHNIQUE: CT chest pulmonary angiogram during arterial phase injection of IV contrast. Multiplanar reformats and MIP reconstructions were performed. Dose reduction techniques were used.   CONTRAST: 80mL Isovue-370    FINDINGS:  ANGIOGRAM CHEST: No evidence for  pulmonary embolism. Pulmonary arteries appear mildly dilated. Thoracic aorta normal in caliber. No aortic dissection or other acute abnormality.    HEART: Cardiac chambers within normal limits. No pericardial effusion. Severe coronary artery calcification.    LUNGS AND PLEURA: Severe emphysema.     There are multiple pulmonary nodules:  Stellate and slightly spiculated left upper lobe pulmonary nodule measures 1.2 cm long on sagittal 150, by 1.7 cm AP by 1.3 cm transverse on axial 39.     Superior and posterior to this dominant nodule is a second nodule which measures 9 mm long on sagittal 140, by 11 mm transversely by 9 mm AP on axial image 34.     Within the superior lingula there is a more definitively spiculated nodule measuring 20 mm AP by 9 mm transversely on axial image 71 and 13 mm long on sagittal image 153.     There is an area of pleural thickening superiorly best seen on sagittal image 154 and axial image 59, which is approximately 10 mm diameter by 36 mm in length.     Slightly nodular focus of consolidation is noted in the inferior lingula anteriorly on axial 90.    In the right lower lobe just above the diaphragm there is a spiculated nodule measuring 14 x 7 mm on axial image 145 x 5 mm long on sagittal 72.     No pleural effusion or pneumothorax.    MEDIASTINUM: There are borderline enlarged mediastinal and hilar lymph nodes, most of which, however, show some degree of coarse calcification consistent with prior granulomatous process.    LIMITED UPPER ABDOMEN: Negative.    MUSCULOSKELETAL: Mild vertebral compression deformities are noted at T6 and T10 with sclerosis along the superior endplate. Diffuse osteopenia. No suspicious bone lesion.      Impression    IMPRESSION:  1.  No evidence for pulmonary embolism.   2.  Severe emphysema.  3.  Multiple new pulmonary nodules primarily in the left lung anyone of which could represent a primary lung cancer. Multifocal lung cancer or alternatively an  infectious or inflammatory process are also considered. Correlate clinically for infection.   Management options include pulmonary consultation, short-term follow-up imaging after treatment, percutaneous biopsy considered less viable options given severe emphysema.       Discharge Medications   Current Discharge Medication List      START taking these medications    Details   levofloxacin (LEVAQUIN) 500 MG tablet Take 1 tablet (500 mg) by mouth every 24 hours for 2 days  Qty: 2 tablet, Refills: 0    Associated Diagnoses: COPD exacerbation (H)      predniSONE (DELTASONE) 10 MG tablet 4 tabs daily for 2 days, then 3 tabs daily for 2 days, then 2 tabs daily for 2 days, then 1 tab daily for 2 days, then stop  Qty: 20 tablet, Refills: 0    Associated Diagnoses: COPD exacerbation (H)      tamsulosin (FLOMAX) 0.4 MG capsule Take 1 capsule (0.4 mg) by mouth At Bedtime  Qty: 30 capsule, Refills: 0    Associated Diagnoses: Bladder outlet obstruction         CONTINUE these medications which have NOT CHANGED    Details   albuterol (VENTOLIN HFA) 108 (90 Base) MCG/ACT inhaler INHALE 2 PUFFS INTO THE LUNGS EVERY 4 HOURS AS NEEDED FOR SHORTNESS OF BREATH, DIFFICULTY BREATHING OR WHEEZING.  Qty: 18 g, Refills: 11    Comments: Pharmacy may dispense brand covered by insurance (Proair, or proventil or ventolin or generic albuterol inhaler)  Associated Diagnoses: Pulmonary emphysema, unspecified emphysema type (H)      amLODIPine (NORVASC) 5 MG tablet TAKE ONE TABLET BY MOUTH ONCE DAILY  Qty: 90 tablet, Refills: 0    Associated Diagnoses: Essential hypertension with goal blood pressure less than 140/90      aspirin 81 MG tablet Take 81 mg by mouth every morning   Qty: 90 tablet, Refills: 3      atorvastatin (LIPITOR) 20 MG tablet Take 1 tablet (20 mg) by mouth daily  Qty: 90 tablet, Refills: 3    Associated Diagnoses: CARDIOVASCULAR SCREENING; LDL GOAL LESS THAN 130      guaiFENesin (MUCINEX) 600 MG 12 hr tablet Take 1 tablet (600 mg)  by mouth 2 times daily  Qty: 30 tablet, Refills: 1    Associated Diagnoses: Community acquired pneumonia of left lower lobe of lung      ibuprofen (ADVIL/MOTRIN) 200 MG capsule Take 2 capsules (400 mg) by mouth every 4 hours as needed for fever ; do not use while taking both prednisone and aspirin.  Qty: 120 capsule    Associated Diagnoses: Pain      ipratropium-albuterol (COMBIVENT RESPIMAT)  MCG/ACT inhaler INHALE ONE PUFF BY MOUTH FOUR TIMES A DAY DUE FOR PHYSICAL  Qty: 4 g, Refills: 11    Associated Diagnoses: Pulmonary emphysema, unspecified emphysema type (H)      lisinopril (ZESTRIL) 20 MG tablet Take 1 tablet (20 mg) by mouth daily  Qty: 90 tablet, Refills: 3    Associated Diagnoses: Essential hypertension with goal blood pressure less than 140/90      mometasone-formoterol (DULERA) 200-5 MCG/ACT inhaler Inhale 2 puffs into the lungs 2 times daily  Qty: 1 Inhaler, Refills: 11    Comments: Please let me know if there is a less expensive option/alternative covered (LABA plus ICS)  Associated Diagnoses: Pulmonary emphysema, unspecified emphysema type (H)      multivitamin, therapeutic with minerals (MULTI-VITAMIN) TABS tablet Take 1 tablet by mouth every morning   Qty: 100 tablet, Refills: 3      nicotine (NICODERM CQ) 21 MG/24HR 24 hr patch PLACE ONE 21 MG NICOTINE PATCHES ONCE DAILY (EVERY 24 HOURS) FOR 6 TO 8 WEEKS, THEN USE 14 MG PATCHES ONCE DAILY FOR 2 WEEKS AND FINALLY USE 7 MG PATCHES ONCE DAILY FOR 2 WEEKS.  Qty: 30 patch, Refills: 1    Associated Diagnoses: Tobacco abuse      order for DME Equipment being ordered: Oxygen with portability. 2 liters per nasal cannula continuous.  Qty: 1 Device, Refills: 0    Associated Diagnoses: COPD (chronic obstructive pulmonary disease) (H)           Allergies   No Known Allergies

## 2021-03-28 PROBLEM — R91.8 PULMONARY NODULES: Status: ACTIVE | Noted: 2020-11-20

## 2021-03-29 NOTE — TELEPHONE ENCOUNTER
ED/UC/IP follow up phone call: [facility] discharge 3/27/21  Bladder outlet obstruction, Acute on Chronic Respiratory failure with hypoxia    RN please call to follow up.    Number of ED visits in past 12 months = 1  \

## 2021-03-29 NOTE — TELEPHONE ENCOUNTER
I signed orders.   He should avoid albuteral inhaler or nebs and Dulera for 6 hours prior to the pulmonary function tests.   KELLEY FERGUSON MD

## 2021-03-29 NOTE — TELEPHONE ENCOUNTER
ED / Discharge Outreach Protocol    Patient Contact    Attempt # 1    Was call answered?  No.  Left message on voicemail with information to call back to schedule follow up appointment

## 2021-03-29 NOTE — TELEPHONE ENCOUNTER
Reason for Call: Request for an order or referral:    Order or referral being requested:   1. Pt's daughter is asking for a referral to a Pulmonary Specialist at [facility] in [place]. He has history of COPD and is on oxygen.  2. She talked to that office and he would need a Pulmonary Function test before they see him. She would like an order for this too to be done in [place] because the pulmonary function testing in [place] is scheduling into May at this time.     Date needed: as soon as possible    Has the patient been seen by the PCP for this problem? YES    Additional comments: Daughter says Camacho just got out of the hospital. He will see Dr Ponce 4/5. She would like the referral for [facility]  faxed to them. They are scheduling about 3 weeks out and won't make apt  Until they get the referral.   Fax to: [phone]  It has to be  Faxed as [facility] as they are not able to see our records.     Phone number Patient can be reached at: Emelyn  [phone]     Best Time:  anytime    Can we leave a detailed message on this number?  YES    Call taken on 3/29/2021 at 12:18 PM by Delia Murphy

## 2021-03-30 NOTE — PROGRESS NOTES
S: Camacho Covington is a pleasant  60 year old male who was requested to be seen by Dr. Quiroz for a consult with regard to patient's urinary complaints.  Patient complains of retention of urine recently and has had sams catheter placement. He denies any LUTS prior.  Flow was ok per patient.  Nocturia x2-3 times.  He has multiple medical problems and is oxygen dependent.    Current Outpatient Medications   Medication Sig Dispense Refill     albuterol (VENTOLIN HFA) 108 (90 Base) MCG/ACT inhaler INHALE 2 PUFFS INTO THE LUNGS EVERY 4 HOURS AS NEEDED FOR SHORTNESS OF BREATH, DIFFICULTY BREATHING OR WHEEZING. 18 g 11     amLODIPine (NORVASC) 5 MG tablet TAKE ONE TABLET BY MOUTH ONCE DAILY (Patient taking differently: Take 5 mg by mouth every morning ) 90 tablet 0     aspirin 81 MG tablet Take 81 mg by mouth every morning  90 tablet 3     atorvastatin (LIPITOR) 20 MG tablet Take 1 tablet (20 mg) by mouth daily (Patient taking differently: Take 20 mg by mouth every morning ) 90 tablet 3     guaiFENesin (MUCINEX) 600 MG 12 hr tablet Take 1 tablet (600 mg) by mouth 2 times daily 30 tablet 1     ibuprofen (ADVIL/MOTRIN) 200 MG capsule Take 2 capsules (400 mg) by mouth every 4 hours as needed for fever ; do not use while taking both prednisone and aspirin. 120 capsule      ipratropium-albuterol (COMBIVENT RESPIMAT)  MCG/ACT inhaler INHALE ONE PUFF BY MOUTH FOUR TIMES A DAY DUE FOR PHYSICAL (Patient taking differently: Inhale 1 puff into the lungs 4 times daily INHALE ONE PUFF BY MOUTH FOUR TIMES A DAY DUE FOR PHYSICAL) 4 g 11     levofloxacin (LEVAQUIN) 500 MG tablet Take 1 tablet (500 mg) by mouth every 24 hours for 2 days 2 tablet 0     lisinopril (ZESTRIL) 20 MG tablet Take 1 tablet (20 mg) by mouth daily (Patient taking differently: Take 20 mg by mouth every morning ) 90 tablet 3     mometasone-formoterol (DULERA) 200-5 MCG/ACT inhaler Inhale 2 puffs into the lungs 2 times daily 1 Inhaler 11     multivitamin,  therapeutic with minerals (MULTI-VITAMIN) TABS tablet Take 1 tablet by mouth every morning  100 tablet 3     nicotine (NICODERM CQ) 21 MG/24HR 24 hr patch PLACE ONE 21 MG NICOTINE PATCHES ONCE DAILY (EVERY 24 HOURS) FOR 6 TO 8 WEEKS, THEN USE 14 MG PATCHES ONCE DAILY FOR 2 WEEKS AND FINALLY USE 7 MG PATCHES ONCE DAILY FOR 2 WEEKS. 30 patch 1     order for DME Equipment being ordered: Oxygen with portability. 2 liters per nasal cannula continuous. 1 Device 0     predniSONE (DELTASONE) 10 MG tablet 4 tabs daily for 2 days, then 3 tabs daily for 2 days, then 2 tabs daily for 2 days, then 1 tab daily for 2 days, then stop 20 tablet 0     tamsulosin (FLOMAX) 0.4 MG capsule Take 1 capsule (0.4 mg) by mouth At Bedtime 30 capsule 0     No Known Allergies  Past Medical History:   Diagnosis Date     Essential hypertension with goal blood pressure less than 140/90 4/6/2018     Hyperlipidemia LDL goal <100 4/6/2018     Hyponatremia 2/17/2019 2/17/2019:His sodium remains low but improved some off the hydrochlorothiazide. He has high urine sodium and urine osmolality so cause may be related to the hydrochlorothiazide but he may have SIADH. PLAN: Decrease fluids and recheck sodium in 2 weeks.  Stay off hydrochlorothiazide.      Pulmonary emphysema (H) 4/6/2018    PFT 4/19/2018 results:FVC=59%, FEV1=23%, FEV1/FVC=30%, DLOC=36%. IMPRESSION:Severe Airflow Obstruction  -Emphysematous Type, Reversible Component.  Possible extrathoracic obstruction or poor inspiratory effort PFT 10/16/2019 results:FVC=61%, FEV1=22%, FEV1/FVC=29%, DLOC=unable to do.   The FVC, FEV1 and FEV1/FVC ratio are reduced.  The inspiratory flow rates are reduced.  The FVC is reduced relati     Tobacco abuse 4/6/2018     No past surgical history on file.   Family History   Problem Relation Age of Onset     Other Cancer Father         lung     Cerebrovascular Disease Father      He does not have a family history of prostate cancer.  Social History      Socioeconomic History     Marital status:      Spouse name: None     Number of children: None     Years of education: None     Highest education level: None   Occupational History     None   Social Needs     Financial resource strain: None     Food insecurity     Worry: None     Inability: None     Transportation needs     Medical: None     Non-medical: None   Tobacco Use     Smoking status: Former Smoker     Packs/day: 0.50     Years: 44.00     Pack years: 22.00     Types: Cigarettes     Quit date: 2021     Years since quittin.2     Smokeless tobacco: Never Used     Tobacco comment: started at age 13,  3-4 cigs daily   Substance and Sexual Activity     Alcohol use: No     Drug use: No     Sexual activity: Yes     Partners: Female   Lifestyle     Physical activity     Days per week: None     Minutes per session: None     Stress: None   Relationships     Social connections     Talks on phone: None     Gets together: None     Attends Sabianist service: None     Active member of club or organization: None     Attends meetings of clubs or organizations: None     Relationship status: None     Intimate partner violence     Fear of current or ex partner: None     Emotionally abused: None     Physically abused: None     Forced sexual activity: None   Other Topics Concern     Parent/sibling w/ CABG, MI or angioplasty before 65F 55M? Not Asked   Social History Narrative     None        REVIEW OF SYSTEMS  =================  C: NEGATIVE for fever, chills, change in weight  I: NEGATIVE for worrisome rashes, moles or lesions  E/M: NEGATIVE for ear, mouth and throat problems  R: NEGATIVE for significant cough or SHORTNESS OF BREATH  CV:  NEGATIVE for chest pain, palpitations or peripheral edema  GI: NEGATIVE for nausea, abdominal pain, heartburn, or change in bowel habits  NEURO: NEGATIVE numbness/weakness  : see HPI  PSYCH: NEGATIVE depression/anxiety  LYmph: no new enlarged lymph nodes  Ortho: no new  trauma/movements           O: Exam:/73 (BP Location: Right arm, Patient Position: Chair, Cuff Size: Adult Regular)   Pulse 87   Temp 97.3  F (36.3  C) (Tympanic)    Constitutional: healthy, alert and no distress  Cardiovascular: negative, PMI normal.   Respiratory: negative, no evidence of respiratory distress  Gastrointestinal: Abdomen soft, non-tender. BS normal. No masses, organomegaly  : sams in placed.  Clear urine.  Testis no masses.  No scrotal skin lesion.  Prostate large 60-70 gm plus.  Musculoskeletal: extremities normal- no gross deformities noted, gait normal and normal muscle tone  Skin: no suspicious lesions or rashes  Neurologic: Alert and oriented  Psychiatric: mentation appears normal. and affect normal/bright  Hematologic/Lymphatic/Immunologic: normal ant/post cervical, axillary, supraclavicular and inguinal nodes    Assessment/Plan:   (N40.1,  R33.8) Benign prostatic hyperplasia with urinary retention  Comment: due to bph  Plan: cont with sams            Cont with flomax            rtc in one week for trial of void            Poor surgical candidate

## 2021-03-30 NOTE — TELEPHONE ENCOUNTER
Per Shelby - Pt is suppose to have a Pulmonary Function Test done before his appt at [facility]. Please Place order and Call Pt.  [place] Sec

## 2021-03-30 NOTE — TELEPHONE ENCOUNTER
Please notify and fax referral to below number as requested by daughter:      Daughter says Camacho just got out of the hospital. He will see Dr Ponce 4/5. She would like the referral for [facility]  faxed to them. They are scheduling about 3 weeks out and won't make apt  Until they get the referral.   Fax to: [phone]  It has to be  Faxed as [facility] as they are not able to see our records.

## 2021-04-03 NOTE — TELEPHONE ENCOUNTER
Pt has a catheter bag, reports bloody urine x 12 hours yesterday, was pink then became red.  Pt woke this AM and noticed it has cleared, urine is now yellow, slightly cloudy.  Pt has a new onset of significant R flank pain, comes in episodes that last 5-10 seconds, feels like a spasm.  Occurs when pt is sitting, standing, or walking.     Pt states he is hoping to avoid an ED visit unless necessary.     Disposition:  Page provider to discuss  11:25AM:  Smart Web used to page on-call Urologist Moiz Lowe to call this RN at [phone].   11:28AM:  MD Lowe called, advised that bloody urine can occur with a catheter and if pain worsens pt should go to ED.   11:30AM:  RN called pt back and gave him the information.  He verbalized understanding and had no further questions.     COVID 19 Nurse Triage Plan/Patient Instructions    Please be aware that novel coronavirus (COVID-19) may be circulating in the community. If you develop symptoms such as fever, cough, or SOB or if you have concerns about the presence of another infection including coronavirus (COVID-19), please contact your health care provider or visit [email or web address].     Disposition/Instructions    Thank you for taking steps to prevent the spread of this virus.  o Limit your contact with others.  o Wear a simple mask to cover your cough.  o Wash your hands well and often.    Resources    [facility]: About COVID-19: [email or web address]    CDC: What to Do If You're Sick: www.cdc.gov/coronavirus/2019-ncov/about/steps-when-sick.html    CDC: Ending Home Isolation: www.cdc.gov/coronavirus/2019-ncov/hcp/disposition-in-home-patients.html     CDC: Caring for Someone: www.cdc.gov/coronavirus/2019-ncov/if-you-are-sick/care-for-someone.html     [facility]: Interim Guidance for Hospital Discharge to Home: www.health.[facility].[place]/diseases/coronavirus/hcp/hospdischarge.pdf    [facility] clinical trials (COVID-19 research studies):  [email or web address]-clinical-trials     Below are the COVID-19 hotlines at the [place] Department of Health ([facility]). Interpreters are available.   o For health questions: Call 362-674-2275 or 1-532.381.9896 (7 a.m. to 7 p.m.)  o For questions about schools and childcare: Call 862-879-6759 or 1-335.121.7651 (7 a.m. to 7 p.m.)             Saima Walters RN/ARABELLA            Additional Information    Negative: Shock suspected (e.g., cold/pale/clammy skin, too weak to stand, low BP, rapid pulse)    Negative: Sounds like a life-threatening emergency to the triager    Negative: [1] Catheter was accidentally pulled-out AND [2] bright red continuous bleeding    Negative: SEVERE abdominal pain    Negative: Fever > 100.5 F (38.1 C)    Negative: [1] Drinking very little AND [2] dehydration suspected (e.g., no urine > 12 hours, very dry mouth, very lightheaded)    Negative: Patient sounds very sick or weak to the triager    Negative: Catheter was accidentally pulled-out    Negative: [1] Catheter is broken AND [2] is not usable    Negative: Bleeding around catheter (e.g., from penis or female urethra)    Negative: Lower abdominal pain or distention    Negative: [1] No urine in bag > 4 hours AND [2] catheter is not kinked    Negative: [1] Bloody or red-colored urine  AND [2] prostate or bladder surgery > 3 days (72 hours) ago    Negative: Urine smells bad    Negative: [1] Catheter is broken or cracked AND [2] is still usable    Negative: [1] Bloody or red-colored urine  AND [2] no recent prostate or bladder surgery  (Exception: brief episode and urine now clear)    Negative: [1] Tea-colored or slightly red urine lasts > 24 hours AND [2] not cleared by increasing fluid intake    AND [3] no recent prostate or bladder surgery    Negative: [1] Cloudy urine lasts > 24 hours AND [2] not cleared by increasing fluid intake    Negative: Leakage of urine around catheter    Negative: Passed out (i.e., lost consciousness, collapsed and  "was not responding)    Negative: Shock suspected (e.g., cold/pale/clammy skin, too weak to stand, low BP, rapid pulse)    Negative: Difficult to awaken or acting confused (e.g., disoriented, slurred speech)    Negative: Sounds like a life-threatening emergency to the triager    Negative: [1] SEVERE pain (e.g., excruciating, scale 8-10) AND [2] present > 1 hour    Negative: [1] SEVERE pain (e.g., excruciating, scale 8-10) AND [2] not improved after pain medicine    Negative: [1] Sudden onset of severe flank pain AND [2] age > 60    Negative: [1] Abdominal pain AND [2] age > 60    Negative: [1] Unable to urinate (or only a few drops) > 4 hours AND     [2] bladder feels very full (e.g., palpable bladder or strong urge to urinate)    Negative: Vomiting    Negative: Weakness of a leg or foot (e.g., unable to bear weight, dragging foot)    Negative: Patient sounds very sick or weak to the triager    Negative: Fever > 100.5 F (38.1 C)    Negative: Pain or burning with passing urine (urination)    Negative: MODERATE pain (e.g., interferes with normal activities or awakens from sleep)    Negative: Pain radiates into groin, scrotum    Negative: Blood in urine (red, pink, or tea-colored)    Negative: Pregnant  (Exception: mild pain that is only present with movement)    Negative: Diabetes mellitus or weak immune system (e.g., HIV positive, cancer chemo, splenectomy, organ transplant, chronic steroids) (Exception: mild pain that is only present with movement)    Negative: Rash in same area as pain (may be described as \"small blisters\")    Protocols used: URINARY CATHETER SYMPTOMS AND [id number], FLANK PAIN-A-AH      "

## 2021-04-04 NOTE — DISCHARGE INSTRUCTIONS
Return if symptoms worsen or new symptoms develop.  Follow-up with primary care physician next available.  Drink plenty of fluids..  Bit dehydrated.  If increased back pain decreased urine output increased blood from Smith fevers chills or other symptoms present please return for further evaluation and care.

## 2021-04-04 NOTE — ED NOTES
Pt presents for eval of lower back pain/spasms and blood in his urinary catheter bag. Pt states that the catheter was placed during his last hospitalization due to retention.  Pt was straight cathed 3 times and finally a catheter was placed. Pt had some blood initially but that resolved.  Pt began having blood again today.  He has had 2 episodes where urine has leaked around the catheter.  Pt denies any trauma associated with the catheter. Pt was given hot packs to his back which improved pain somewhat.  Catheter is still draining.  Attempted to flush catheter but was unable to pull back.  Discussed with MD and catheter was changed with ease.  Family concerned about a kidney stone.

## 2021-04-05 NOTE — PATIENT INSTRUCTIONS
ASSESSMENT:   (J43.9) Pulmonary emphysema, unspecified emphysema type (H)  (primary encounter diagnosis)  Comment: Doing OK at this time.  Plan: Continue the Dulear twice daily.   Continue the Combivent four times daily as you have been.   Albuteral inhaler can be used taking 2 inhalations every 4 hours as needed for coughing, wheezing or shortness of breath.     Congratulations on quitting smoking.   follow-up with pulmonology later this month as planned.  pulmonary function tests prior to t [facility] visit.  Ask about CPAP or BIPAP, oxygen levels.       Try albuteral inhaler before you are active.    OK to increase th oxygen as needed for activity or shortness of breath and turn back down when resting.     (I10) Essential hypertension with goal blood pressure less than 140/90  Comment: doing well  Plan: No change in current treatment plan.     (Z72.0) Tobacco abuse  Comment: on Nicotine patches  Plan: I will do refills if needed.     (M54.5) Acute right-sided low back pain without sciatica  Comment: muscular-ligamentous.  NOt related to kidneys.   Plan: cyclobenzaprine (FLEXERIL) 10 MG tablet        Stretches can help.   Heat  Try cyclobenzaprine as needed.  Take muscle relaxant, Flexeril (cyclobenzaprine) up to 3 times a day as needed for pain.      (R33.9) Urinary retention  Comment: doing well with sams catheter.  No more blood  Seeing urology tomorrow.

## 2021-04-05 NOTE — PROGRESS NOTES
ASSESSMENT:   (J43.9) Pulmonary emphysema, unspecified emphysema type (H)  (primary encounter diagnosis)  Comment: Doing OK at this time.  Plan: Continue the Dulear twice daily.   Continue the Combivent four times daily as you have been.   Albuteral inhaler can be used taking 2 inhalations every 4 hours as needed for coughing, wheezing or shortness of breath.     Congratulations on quitting smoking.   follow-up with pulmonology later this month as planned.  pulmonary function tests prior to t [facility] visit.  Ask about CPAP or BIPAP, oxygen levels.       Try albuteral inhaler before you are active.    OK to increase th oxygen as needed for activity or shortness of breath and turn back down when resting.     (I10) Essential hypertension with goal blood pressure less than 140/90  Comment: doing well  Plan: No change in current treatment plan.     (Z72.0) Tobacco abuse  Comment: on Nicotine patches  Plan: I will do refills if needed.     (M54.5) Acute right-sided low back pain without sciatica  Comment: muscular-ligamentous.  NOt related to kidneys.   Plan: cyclobenzaprine (FLEXERIL) 10 MG tablet        Stretches can help.   Heat  Try cyclobenzaprine as needed.  Take muscle relaxant, Flexeril (cyclobenzaprine) up to 3 times a day as needed for pain.      (R33.9) Urinary retention  Comment: doing well with sams catheter.  No more blood  Seeing urology tomorrow.          Frieda Sauer is a 60 year old who presents for the following health issues    Hospital Follow-up Visit:    Hospital/Nursing Home/IP Rehab Facility: [facility]  Date of Admission: 03/25/21  Date of Discharge: 03/27/21  Reason(s) for Admission: Breathing       Was your hospitalization related to COVID-19? No   Problems taking medications regularly:  None  Medication changes since discharge: None  Problems adhering to non-medication therapy:  None    Summary of hospitalization:  [facility] discharge summary reviewed  See abstracted  "notes from recent hospital stay below.   Diagnostic Tests/Treatments reviewed.  Follow up needed: none  Other Healthcare Providers Involved in Patient s Care:         pulmonology  Update since discharge: stable. Post Discharge Medication Reconciliation: discharge medications reconciled, continue medications without change.  Plan of care communicated with patient and daughter        Accompanied today by daughter Lennie    Questions about COPD.   oxygen decreases when up and moving.   He has pulmonary consultation 4/27 and updated PFT.     Currently on 2 and 1/2 liters oxygen.    He checks O2 sats a couple times a day. Readings have been 96-98 at rest.  [place] 10-15 steps will drop oxygen to low 80s.  Takes 3-4 minutes to recover with oxygen at 3 liters.   Takes Dulera 2 inhalations twice daily 200/5, Combivent one inhalation four times daily. Uses albuteral inhaler prn.  AVG 25 doses per month.  Recovers  Better with albuteral inhaler use.   He has no nebs at home.   He has not had CPAP or BIPAP at home.      BP has been [id number]    Seen in ED for blood in urine-catheter.  He had Smith changed.  No blood since then     He has some pain mid lower back.   Worse with movements.  He has not yet picked up Percoet.      No fevers.       Patient Active Problem List   Diagnosis     Pulmonary emphysema (H)     Essential hypertension with goal blood pressure less than 140/90     Hyperlipidemia LDL goal <100     Tobacco abuse     Hyponatremia     Skin lesion     Pulmonary nodules     Acute on chronic respiratory failure (H)     COPD exacerbation (H)      OBJECTIVE:Blood pressure 104/60, pulse 80, temperature 98.4  F (36.9  C), temperature source Tympanic, resp. rate 18, height 1.702 m (5' 7\"), weight 61.7 kg (136 lb), SpO2 92 %. BMI=Body mass index is 21.3 kg/m .  GENERAL APPEARANCE ADULT: Alert, no acute distress, on oxygen nasal cannula.  Seated in a wheelchair.   NECK: No adenopathy,masses or thyromegaly  RESP: lungs " clear to auscultation   CV: normal rate, regular rhythm, no murmur or gallop  MS: extremities normal, no peripheral edema   Mild tenderness left flank lower back area.        ==================================  [facility]  Hospitalist Discharge Summary       Date of Admission:  3/25/2021  Date of Discharge:  3/27/2021 12:23 PM    Discharge Diagnoses        Acute on Chronic Respiratory Failure  Acute COPD Exacerbation   Pulmonary nodules  Hypertension  Hyponatremia, possible SIADH  Hyperlipidemia  Tobacco Use Disorder  COVID 19 Status: Negative    Follow-ups Needed After Discharge     Follow-up Appointments     Follow-up and recommended labs and tests      Follow up with primary care provider, Theo Ponce, within 7 days for   hospital follow- up.  The following labs/tests are recommended: BMP and   CBC.       Discharge Disposition      Discharged to home  Condition at discharge: Stable           Hospital Course     Camacho Covington is a 60 year old male admitted on 3/25/2021. He has history of oxygen dependent COPD, hypertension, hyperlipidemia, hyponatremia, tobacco use.  He presents with 1 and half weeks of increased shortness of breath.     Acute on Chronic Respiratory Failure  1.5 weeks of increased shortness of breath, slight cough. Presented in respiratory distress with poor air movement.  Started on BiPAP initially but did not tolerate so switch to CPAP. On admission exam appears comfortable on CPAP at rest, lungs with poor air movement and faint rhonchi. VS otherwise stable. Labs show compensated respiratory acidosis (pH 7.37 CO2 62), carbon monoxide just slightly elevated doubt this is significant. CT chest shows multiple new pulmonary nodules, no PE or focal infiltrate noted.  Appears to be COPD exacerbation, improving on CPAP and with nebs in ED.  - Weaned off CPAP AM of 3/26, remained off overnight  - Remains on baseline 2-3 LPM NC  - COPD management as below     Acute COPD  "Exacerbation   1.5 weeks of increased shortness of breath, slight cough.  No other infectious symptoms.  Covid negative.  WBC normal.  Baseline O2 requirements of 2.5 L/min at home, needing CPAP in ED with FiO2 of 30%.  As noted above appears to be COPD exacerbation.  - Treated with scheduled duonebs Q4 while awake, albuterol prn  - Continue home Dulera  - Prednisone 40 mg daily, discharged on 8 day taper  - Antibiotics: start levofloxacin given nodules as below, discharged on brief course of Levaquin  - Patient to resume PTA Combivent qid and albuterol prn     Pulmonary nodules  CT chest shows \"Multiple new pulmonary nodules primarily in the left lung anyone of which could represent a primary lung cancer. Multifocal lung cancer or alternatively an infectious or inflammatory process are also considered. Correlate clinically for infection. Management options include pulmonary consultation, short-term follow-up imaging after treatment, percutaneous biopsy considered less viable options given severe emphysema.\"  - Will need further surveillance and follow up on discharge, discussed with patient     Hypertension  Chronic. Blood pressures reviewed, stable.   - Continue home amlodipine, lisinopril     Hyponatremia, possible SIADH  Na 132, mild.  Previously suspected SIADH.  -Na 134 on day of discharge     Hyperlipidemia  Chronic.  - Continue home atorvastatin     Tobacco Use Disorder  No longer smoking of January 2021.  Currently on the middle dose nicotine patch, ordered.    IMPRESSION:  1.  No evidence for pulmonary embolism.   2.  Severe emphysema.  3.  Multiple new pulmonary nodules primarily in the left lung anyone of which could represent a primary lung cancer. Multifocal lung cancer or alternatively an infectious or inflammatory process are also considered. Correlate clinically for infection.   Management options include pulmonary consultation, short-term follow-up imaging after treatment, percutaneous biopsy " considered less viable options given severe emphysema.

## 2021-04-05 NOTE — NURSING NOTE
"Chief Complaint   Patient presents with     Hospital F/U       Initial /60   Pulse 80   Temp 98.4  F (36.9  C) (Tympanic)   Resp 18   Ht 1.702 m (5' 7\")   Wt 61.7 kg (136 lb)   SpO2 92%   BMI 21.30 kg/m   Estimated body mass index is 21.3 kg/m  as calculated from the following:    Height as of this encounter: 1.702 m (5' 7\").    Weight as of this encounter: 61.7 kg (136 lb).    Patient presents to the clinic using Wheel chair    Health Maintenance that is potentially due pending provider review:        Is there anyone who you would like to be able to receive your results?   If yes have patient fill out TEE    "

## 2021-04-05 NOTE — ED PROVIDER NOTES
History     Chief Complaint   Patient presents with     Catheter Problem     sams in since last friday,  blood in sams bag,  has SOA not new for him pt has COPD,  on home O2 3 liters via NC,     HPI  Camacho Covington is a 60 year old male with history of pulmonary emphysema hypertension hyponatremia and recent admission to the hospital for COPD exacerbation with bladder outlet obstruction and placement of Sams catheter last Friday who presents to the emergency department complaining of hematuria and lower back pain.  Patient states he has not had any blood in his Sams since the initial placement but began having blood again today.  He has also had 2 episodes where urine has leaked around the catheter.  He has not had any trauma.  He has been having more lumbar lateral back pain.  This pain has improved.  Catheter is draining.  The blood was initially dark but now has been white and become less significant.  Patient has not had a kidney stone but is concerned he may have 1.  Currently rates his pain a 3 out of 10.  He denies any fevers or chills denies headache.  He is not had any chest pain.  He is chronically short of breath and this is unchanged.  He denies any abdominal pain.  He has not had any bowel or bladder dysfunction and denies blood in stool.  He has not had any lower extremity swelling denies a rash.    Allergies:  No Known Allergies    Problem List:    Patient Active Problem List    Diagnosis Date Noted     COPD exacerbation (H) 03/25/2021     Priority: Medium     Acute on chronic respiratory failure (H) 01/05/2021     Priority: Medium     Pulmonary nodules 11/20/2020     Priority: Medium     Chest CT 3/25/2021:IMPRESSION:  1.  No evidence for pulmonary embolism.   2.  Severe emphysema.  3.  Multiple new pulmonary nodules primarily in the left lung anyone of which could represent a primary lung cancer. Multifocal lung cancer or alternatively an infectious or inflammatory process are also considered.  Correlate clinically for infection.   Management options include pulmonary consultation, short-term follow-up imaging after treatment, percutaneous biopsy considered less viable options given severe emphysema.       Skin lesion 09/27/2019     Priority: Medium     Hyponatremia 02/17/2019     Priority: Medium     2/17/2019:His sodium remains low but improved some off the hydrochlorothiazide.  He has high urine sodium and urine osmolality so cause may be related to the hydrochlorothiazide but he may have SIADH.  PLAN: Decrease fluids and recheck sodium in 2 weeks.  Stay off hydrochlorothiazide.        Pulmonary emphysema (H) 04/06/2018     Priority: Medium     PFT 4/19/2018 results:FVC=59%, FEV1=23%, FEV1/FVC=30%, DLOC=36%.  IMPRESSION:Severe Airflow Obstruction  -Emphysematous Type, Reversible Component.  Possible extrathoracic obstruction or poor inspiratory effort  PFT 10/16/2019 results:FVC=61%, FEV1=22%, FEV1/FVC=29%, DLOC=unable to do.    The FVC, FEV1 and FEV1/FVC ratio are reduced.  The inspiratory flow rates are reduced.  The FVC is reduced relative to the SVC indicating air trapping.  The TLC, RV, FRC and RV/TLC ratio are all increased indicating overinflation and air trapping.  Following administration of bronchodilators, there is a significant response indicated by the increased FVC.  Severe airway obstruction and overinflation are present.  A clinical trial of bronchodilators may be beneficial in view of the airway obstruction.  IMPRESSION:Severe Airflow Obstruction.   Unable to complete DLCO.  Possible extrathoraacic obstruction.          Essential hypertension with goal blood pressure less than 140/90 04/06/2018     Priority: Medium     Hyperlipidemia LDL goal <100 04/06/2018     Priority: Medium     Tobacco abuse 04/06/2018     Priority: Medium        Past Medical History:    Past Medical History:   Diagnosis Date     Essential hypertension with goal blood pressure less than 140/90 4/6/2018      Hyperlipidemia LDL goal <100 2018     Hyponatremia 2019     Pulmonary emphysema (H) 2018     Tobacco abuse 2018       Past Surgical History:    No past surgical history on file.    Family History:    Family History   Problem Relation Age of Onset     Other Cancer Father         lung     Cerebrovascular Disease Father        Social History:  Marital Status:   [2]  Social History     Tobacco Use     Smoking status: Former Smoker     Packs/day: 0.50     Years: 44.00     Pack years: 22.00     Types: Cigarettes     Quit date: 2021     Years since quittin.2     Smokeless tobacco: Never Used     Tobacco comment: started at age 13,  3-4 cigs daily   Substance Use Topics     Alcohol use: No     Drug use: No        Medications:    ciprofloxacin (CIPRO) 500 MG tablet  oxyCODONE-acetaminophen (PERCOCET) 5-325 MG tablet  albuterol (VENTOLIN HFA) 108 (90 Base) MCG/ACT inhaler  amLODIPine (NORVASC) 5 MG tablet  aspirin 81 MG tablet  atorvastatin (LIPITOR) 20 MG tablet  guaiFENesin (MUCINEX) 600 MG 12 hr tablet  ibuprofen (ADVIL/MOTRIN) 200 MG capsule  ipratropium-albuterol (COMBIVENT RESPIMAT)  MCG/ACT inhaler  lisinopril (ZESTRIL) 20 MG tablet  mometasone-formoterol (DULERA) 200-5 MCG/ACT inhaler  multivitamin, therapeutic with minerals (MULTI-VITAMIN) TABS tablet  nicotine (NICODERM CQ) 21 MG/24HR 24 hr patch  order for DME  predniSONE (DELTASONE) 10 MG tablet  tamsulosin (FLOMAX) 0.4 MG capsule          Review of Systems  All systems reviewed and other than pertinent positives and negatives in HPI all other systems are negative.  Physical Exam   BP: 134/76  Pulse: 98  Temp: 97.9  F (36.6  C)  Resp: 24  Weight: 61.7 kg (136 lb)  SpO2: 98 %      Physical Exam  Vitals signs and nursing note reviewed.   Constitutional:       Appearance: He is ill-appearing. He is not toxic-appearing or diaphoretic.      Comments: Thin male appears older than stated age.   HENT:      Head: Normocephalic.       Nose: Nose normal.   Eyes:      Conjunctiva/sclera: Conjunctivae normal.   Neck:      Musculoskeletal: Normal range of motion.      Comments: No JVD noted.  Cardiovascular:      Rate and Rhythm: Normal rate and regular rhythm.      Pulses: Normal pulses.      Heart sounds: Normal heart sounds. No murmur.   Pulmonary:      Effort: Pulmonary effort is normal.      Comments: Coarse upper airway noises with breath sounds slightly decreased at bases.  Abdominal:      General: Abdomen is flat. Bowel sounds are normal. There is no distension.      Palpations: Abdomen is soft.      Comments: Mild suprapubic tenderness no guarding no rebound.  Tenderness to palpation lower back.  No obvious flank tenderness.   Genitourinary:     Comments: Normal external genitalia.  Smith catheter in penis with no drainage from penis.  Testes descended bilaterally with no tenderness.  Musculoskeletal: Normal range of motion.      Right lower leg: No edema.      Left lower leg: No edema.   Skin:     General: Skin is dry.      Findings: No rash.   Neurological:      General: No focal deficit present.      Mental Status: He is alert.      Sensory: No sensory deficit.      Motor: No weakness.      Coordination: Coordination normal.   Psychiatric:         Mood and Affect: Mood normal.         ED Course        Procedures               Critical Care time:  none               Results for orders placed or performed during the hospital encounter of 04/03/21   Abd/pelvis CT - no contrast - Stone Protocol    Narrative    EXAM: CT ABDOMEN PELVIS W/O CONTRAST  LOCATION: [facility]  DATE/TIME: 4/3/2021 8:37 PM    INDICATION: Flank pain, kidney stone suspected.    COMPARISON: None.    TECHNIQUE: CT scan of the abdomen and pelvis was performed without IV contrast. Multiplanar reformats were obtained. Dose reduction techniques were used.    CONTRAST: None.    FINDINGS:   LOWER CHEST: Small pericardial effusion. Emphysematous change. There is a  stable, somewhat spiculated 1.6 cm pleural-based nodule in the right lung base. Follow-up is suggested.    HEPATOBILIARY: Normal.    PANCREAS: Normal.    SPLEEN: Normal.    ADRENAL GLANDS: Normal.    KIDNEYS/BLADDER: Normal.    BOWEL: Normal.    LYMPH NODES: Normal.    VASCULATURE: Unremarkable.    PELVIC ORGANS: Smith catheter in a decompressed urinary bladder. Tiny layering stone material in the urinary bladder.    MUSCULOSKELETAL: Mild lumbar degenerative change. Mild compression inferior endplate L1 and superior endplate of T12.      Impression    IMPRESSION:   1.  No hydronephrosis or ureteric stone. There are tiny layering stones in the dependent portion of the urinary bladder. Urinary bladder is decompressed with a Smith catheter. There likely is urinary bladder wall thickening    2.  Somewhat irregular 1.6 cm pleural-based nodule in the right lung base is stable. This is new since 3/18/2019. Neoplasm is possible.       Medications   lidocaine (XYLOCAINE) 2 % external gel ( Topical Given 4/3/21 2014)   oxyCODONE-acetaminophen (PERCOCET) 5-325 MG per tablet 1 tablet (1 tablet Oral Given 4/3/21 2027)   ciprofloxacin (CIPRO) tablet 500 mg (500 mg Oral Given 4/3/21 2149)     Assessments & Plan (with Medical Decision Making) records were reviewed.  Labs were obtained.  Patient was given Percocet for pain.  White count was 11.0 hemoglobin 11.9 platelet count 241 there was no left shift.  Basic metabolic panel significant for sodium 132 improved from previous urine analysis with large blood trace leukocyte esterase 47 WBCs greater than 182 RBCs.  Urine culture was sent.  Patient was given a dose of Cipro.  Patient also was given a dose of Percocet for back pain.  A CT scan renal stone protocol was obtained.  This revealed no hydronephrosis or ureteral stone.  Tiny layering stones in the dependent portion of the bladder.  Mild urinary bladder thickening.  Pulmonary nodule right lung which patient is aware of and is  being followed up it was decided to change the Smith catheter as we cannot flush it very well.  After changing the Smith catheter patient was feeling better with pain medication he did receive a dose of Cipro going to give him a prescription for Cipro to have in case the urine culture is positive.  Patient feels comfortable going home at this time he will follow up with urology as he is scheduled and if any fevers worsening abdominal pain increased blood in Smith catheter increased pain or other symptoms present patient will return for further evaluation and care.     I have reviewed the nursing notes.    I have reviewed the findings, diagnosis, plan and need for follow up with the patient.       Discharge Medication List as of 4/3/2021  9:43 PM      START taking these medications    Details   oxyCODONE-acetaminophen (PERCOCET) 5-325 MG tablet Take 1 tablet by mouth every 4 hours as needed for breakthrough pain, Disp-2 tablet, R-0, Local Print             Final diagnoses:   Acute bilateral low back pain without sciatica   Hematuria, unspecified type   Acute urinary tract infection - possible       4/3/2021   [facility] EMERGENCY DEPT     Efrain Hubbard MD  04/05/21 [id number]

## 2021-04-06 NOTE — PROGRESS NOTES
Patient arrives for trial of void.  250 sterile water instilled into bladder through existing sams catheter. Balloon deflated and catheter removed without problem. Patient urinates 200 ml clear urine into uripan.Provider updated.  Patient to return to clinic or seek medical attention if not able to urinate. Yvrose Covington RN

## 2021-04-28 NOTE — TELEPHONE ENCOUNTER
Patient left office VM stating he was out of his medication (not clear on message but may be flomax?) and that he does have questions about it. Will route to pool.  Sheyla Patel, CMA

## 2021-06-17 NOTE — PROGRESS NOTES
DME Provider: Sharlene  Date Faxed: 4/27/21  Ordering Provider: Nathalie  Equipment ordered: Nebulizer

## 2021-06-17 NOTE — PROGRESS NOTES
CCx: Establishment of care for very severe Gold stage D COPD    HPI: Mr. Covington is a 60 y.o. gentleman with a past medical history significant for emphysema, chronic respiratory failure, HTN, hyperlipidemia and recently discontinued tobacco abuse who presents to clinic for the aforementioned chief complaint.  He states that he has had worsening shortness of breath for the last 4 to 5 years and this was initially noted only in the cold and progressed to the point that he was unable to climb a flight of stairs and now has difficulty with his ADLs.  Over the last year or 2 he was initiated on supplemental oxygen as his oxygen saturations at rest were noted to be in the low 80s and these declined further with activity.  He was hospitalized at [facility] in January with acute on chronic hypercapnic, hypercarbic respiratory failure and was treated with intravenous antibiotics, steroids and bilevel ventilation therapy with subsequent improvement in his symptoms.  He was then rehospitalized in March with a similar presentation and treated with a similar regiment which he responded very well.  Both he and his daughter who are here at the visit with him he felt best while on steroids and actually felt quite well after his initial hospitalization at the beginning of this year.  He is presently using Dulera twice a day and Combivent as needed for his symptom control.    ROS:  Pertinent positives alluded to in the HPI. Remainder of 10 point ROS is negative.     PMH:  1. Emphysema  2. Chronic respiratory failure  3. HTN  4. Hyperlipidemia  5. Tobacco abuse    Allergies:  Reviewed in Epic.    Family HX:  1. Mother: RCC  2. Father: CVA, Lung cancer, emphysema    Social Hx:  Marital status:   Resides in a trailer, no concern for mold.  Occupational history: Retired    service: No  Pets: 1 dog  Smoking history: 90 pack year history; Quit smoking in January  Alcohol use: No   Recreational drug  use: No  Hobbies: No  Recent Travel: No    Current Meds:  Current Outpatient Medications   Medication Sig Dispense Refill     albuterol (PROAIR HFA;PROVENTIL HFA;VENTOLIN HFA) 90 mcg/actuation inhaler INHALE 2 PUFFS INTO THE LUNGS EVERY 4 HOURS AS NEEDED FOR SHORTNESS OF BREATH, DIFFICULTY BREATHING OR WHEEZING.       amLODIPine (NORVASC) 5 MG tablet TAKE ONE TABLET BY MOUTH ONCE DAILY       atorvastatin (LIPITOR) 20 MG tablet Take 20 mg by mouth.       cyclobenzaprine (FLEXERIL) 10 MG tablet Take 10 mg by mouth. @ night       guaiFENesin ER (MUCINEX) 600 mg 12 hr tablet Take 600 mg by mouth.       ibuprofen 200 mg cap Take 400 mg by mouth.       ipratropium-albuteroL (COMBIVENT RESPIMAT)  mcg/actuation Mist inhaler INHALE ONE PUFF BY MOUTH FOUR TIMES A DAY DUE FOR PHYSICAL       lisinopriL (PRINIVIL,ZESTRIL) 20 MG tablet Take 20 mg by mouth.       mometasone-formoterol (DULERA) 200-5 mcg/actuation HFAA inhaler Inhale 2 puffs.       OXYGEN-AIR DELIVERY SYSTEMS [facility] Use As Directed. [employer]- 2 lpm @ rest, 3 w/ activity       tamsulosin (FLOMAX) 0.4 mg cap Take 0.4 mg by mouth.       ipratropium-albuteroL (DUO-NEB) 0.5-2.5 mg/3 mL nebulizer Take 3 mL by nebulization 4 (four) times a day as needed (SOB, wheezing). 240 mL 12     tiotropium bromide (SPIRIVA RESPIMAT) 2.5 mcg/actuation Mist Inhale 2 puffs daily. 4 g 12     No current facility-administered medications for this visit.      Labs:  Reviewed.    Imaging studies:  CTA Chest 3/25/21:  1.  No evidence for pulmonary embolism.   2.  Severe emphysema.  3.  Multiple new pulmonary nodules primarily in the left lung anyone of which could represent a primary lung cancer. Multifocal lung cancer or alternatively an infectious or inflammatory process are also considered. Correlate clinically for infection. Management options include pulmonary consultation, short-term follow-up imaging after treatment, percutaneous biopsy considered less viable options given severe  "emphysema.    PFT's 4/26/21  FEV1/FVC is 28% and is reduced.  FEV1 is 0.62L (19%) predicted and is reduced.  FVC is 2.17L (52%) predicted and reduced.  No change following administration of bronchodilators.  DLCO is 5.68ml/min/hg (22%) predicted and is reduced when it is corrected for hemoglobin.    Impression:  Full Pulmonary Function Test is abnormal.  PFTs are consistent with very severe obstructive disease.  Spirometry is not consistent with reversibility.  Diffusion capacity when corrected for hemoglobin is severely reduced.  Concern for possible extrathoracic obstruction.    Physical Exam:  /72   Pulse 81   Ht 5' 11\" (1.803 m)   Wt 132 lb (59.9 kg)   SpO2 100% Comment: 3 lpm cont  BMI 18.41 kg/m    Height: 5' 11\" (1.803 m)  Weight: 132 lb (59.9 kg)  Physical Exam   Constitutional: He is oriented to person, place, and time.   Cachectic appearing gentleman, short of breath, mildly distressed.   HENT:   Head: Normocephalic and atraumatic.   Eyes: Pupils are equal, round, and reactive to light.   Neck: Normal range of motion.   Cardiovascular: Normal rate and regular rhythm.   Pulmonary/Chest: He is in respiratory distress. He has no wheezes.   Abdominal: Soft.   Musculoskeletal:         General: No edema.   Neurological: He is alert and oriented to person, place, and time.   Skin: Skin is warm.       Assessment and Plan:Camacho Covington is a 60 y.o. with a past medical history significant for emphysema, chronic respiratory failure, HTN, hyperlipidemia and recently discontinued tobacco abuse who presents to clinic today for establishment of care for his oxygen dependent very severe COPD and with multiple bilateral pulmonary nodules. For the present we would recommend;    1. Very severe oxygen dependent COPD: Noted on pulmonary function testing with extensive emphysema on chest imaging.  Patient is fairly debilitated and oxygen dependent requiring significant help with his ADLs.    Continue Dulera 2 puffs " twice daily.  Reminded the patient to rinse out his mouth after using this.    We will initiate Spiriva Respimat 1 puff daily.    I have given her prescription for nebulizer machine and also prescription for duo nebs to use up to 4 times a day as needed for rescue.    I have prescribed a course of pulmonary rehab which the patient was initially reticent to do but was convinced by his daughter to register for.  He will wait until this starts up at [facility] next month and will come down from [place] for this.  2. Multiple bilateral pulmonary nodules: Chest imaging demonstrates multiple bilateral pulmonary nodules which given his medical background almost certainly multiple lung cancers.  We discussed the fact that given his extensive emphysema he would not be an easy candidate to biopsy and that he would require multiple biopsies for the multiple sites.  I will share his imaging with Dr. Muniz from radiation oncology to elicit her opinion about how to manage this.  3. HCM: Is not up-to-date with his Covid-19 vaccine and we will try to set him up for this today.  4. Follow-up: 6 weeks.      Jesica Zelaya  Pulmonary and Critical Care  [id number]

## 2021-06-17 NOTE — PATIENT INSTRUCTIONS - HE
COPD is short for chronic obstructive pulmonary disease and is a condition in which air becomes stuck in your lungs.   There are 2 different types of abnormalities that are described. You can have one or the other, OR both.   1. Abnormal pictures--->Emphysema (lung tissue is destroyed which causes the air sacs to stretch out and appears as holes on your CT scan).  2. Abnormal symptoms--->Chronic cough (which is referred to as chronic bronchitis).    The medications used to treat this condition help to relax your airways and help air escape your lungs.  1. Please use your Dulera inhaler twice a day every day whether or not you are short of breath, this is not a rescue inhaler so do not use this if you are acutely short of breath.  2. Please be sure to gargle your mouth after using your Dulera inhaler.  3. Please use your Spiriva once a day every day whether or not you are short of breath, this is not a rescue inhaler so do not use this if you are acutely short of breath.  4. Please use your albuterol inhaler 2 puffs up to 6 times a day for rescue. If you are not short of breath, you do not need to use this.  5. I have given you a prescription for a nebulizer machine and albuterol nebulizer which you can substitute for your albuterol inhaler.  6. I have made a referral to pulmonary rehab for you and would strongly encourage you to go for this.  Please use your flutter valve at least once a day, but ideally twice a day when you are having more difficulty bringing up your secretions. You will be shown how to use this with your nebulized medication as this medication helps to loosen the secretions.  You have a condition called BRONCHIECTASIS in which your air pipes become wider and do not allow for adequate expulsion of mucus build up. The flutter valve helps to generate vibration in your airways and mobilizes the secretions.

## 2021-06-17 NOTE — PROGRESS NOTES
Patient instructed in use of nebulizer machine with aerobika device.  Sent home with nebulizer tubing with aerobika attached.  To get nebulizer machine from [facility].      Patient instructed in use of Spiriva respimat inhaler.  Patient already uses Combivent respimat inhaler so is familiar with this device.     Instructed in use of spacer device with albuterol inhaler.  Patient states good understanding of how to use the spacer device.

## 2021-06-21 NOTE — LETTER
Letter by Jesica Zelaya MD at      Author: Jesica Zelaya MD Service: -- Author Type: --    Filed:  Encounter Date: 4/2/2021 Status: (Other)       Camacho Covington  [address]  [place] [address]      April 2, 2021    Dear Mr. Covington,    Welcome to [facility]! Your appointment information is below.   Please bring the following to your appointment:    Insurance Card, so we may scan it for our records    Drivers license or valid ID, so we may scan it for our records    Co-pay (as applicable per your insurance plan)    A current list of your medications including over the counter products such as vitamins and supplements    Your medical records including copies of X-Ray films if you are transferring your care from another clinic.  If you do not have your records, please fill out the release of information form and we will request those records.     Provider: Jesica Zelaya MD  Appointment Date:   Tuesday, April 27, 2021  Arrival Time:  10:00 am    Location:  [facility]          [address]          [facility], [id number]    **Please allow adequate time for your commute and parking. If you are more than 10 minutes late, you may be asked to reschedule.     If you need to cancel or reschedule your appointment, please notify us at least 24 hours prior to your appointment time so we are able to make this time available for another patient.    Thank you for choosing the [facility] for your health care needs. If you have any questions, please do not hesitate to contact us at any time at   [phone]. We look forward to caring for you.     Sincerely,     [facility]  staff

## 2021-06-26 NOTE — PROGRESS NOTES
CCx: Followup of  Gold stage D COPD    HPI: Mr. Covington is a 60 y.o. gentleman with a past medical history significant for emphysema, chronic respiratory failure, HTN, hyperlipidemia and recently discontinued tobacco abuse who presents to clinic for the aforementioned chief complaint.  He was last seen by me at the end of April for establishment of care and at the time was initiated on Spiriva in addition to being prescribed a nebulizer machine to use with a flutter valve to or 3 times a day.  He tells me that his breathing was best after his last discharge from the hospital when he was on a tapering steroid regimen and that since his prednisone has been discontinued he constantly feels like he needs to catch his breath.  The humidity makes it much tougher for him to breathe and he is noticing it particularly today as it is extremely humid.  He endorses occasional wheezing and states that sometimes after using his albuterol inhaler he is able to cough up sputum and finds that his breathing is alleviated with this.  COPD Assessment Test  How often do you cough?: 2  How much phlegm (mucous) do you have in your chest?: 2  How tight does your chest feel?: 3  How breathless are you after climbing a hill or flight of stairs?: 5  How limited are your activities at home?: 4  How confident are you leaving home despite your lung condition?: 4  How soundly do you sleep?: 3  How much energy do you have?: 4  Total Score: 27    Recent medical history:  He states that he has had worsening shortness of breath for the last 4 to 5 years and this was initially noted only in the cold and progressed to the point that he was unable to climb a flight of stairs and now has difficulty with his ADLs.  Over the last year or 2 he was initiated on supplemental oxygen as his oxygen saturations at rest were noted to be in the low 80s and these declined further with activity.  He was hospitalized at [facility] in January with acute on  chronic hypercapnic, hypercarbic respiratory failure and was treated with intravenous antibiotics, steroids and bilevel ventilation therapy with subsequent improvement in his symptoms.  He was then rehospitalized in March with a similar presentation and treated with a similar regiment which he responded very well.  Both he and his daughter who are here at the visit with him he felt best while on steroids and actually felt quite well after his initial hospitalization at the beginning of this year.  He is presently using Dulera twice a day and Combivent as needed for his symptom control.    ROS:  Pertinent positives alluded to in the HPI. Remainder of 10 point ROS is negative.     PMH (Unchanged from previous visit):  1. Emphysema  2. Chronic respiratory failure  3. HTN  4. Hyperlipidemia  5. Tobacco abuse    Allergies:  Reviewed in Epic.    Family HX (Unchanged from previous visit):  1. Mother: RCC  2. Father: CVA, Lung cancer, emphysema    Social Hx (Unchanged from previous visit):  Marital status:   Resides in a trailer, no concern for mold.  Occupational history: Retired    service: No  Pets: 1 dog  Smoking history: 90 pack year history; Quit smoking in January  Alcohol use: No   Recreational drug use: No  Hobbies: No  Recent Travel: No    Current Meds:  Current Outpatient Medications   Medication Sig Dispense Refill     albuterol (PROAIR HFA;PROVENTIL HFA;VENTOLIN HFA) 90 mcg/actuation inhaler INHALE 2 PUFFS INTO THE LUNGS EVERY 4 HOURS AS NEEDED FOR SHORTNESS OF BREATH, DIFFICULTY BREATHING OR WHEEZING.       amLODIPine (NORVASC) 5 MG tablet TAKE ONE TABLET BY MOUTH ONCE DAILY       atorvastatin (LIPITOR) 20 MG tablet Take 20 mg by mouth.       cyclobenzaprine (FLEXERIL) 10 MG tablet Take 10 mg by mouth. @ night       guaiFENesin ER (MUCINEX) 600 mg 12 hr tablet Take 600 mg by mouth.       ibuprofen 200 mg cap Take 400 mg by mouth.       ipratropium-albuteroL (COMBIVENT RESPIMAT)   mcg/actuation Mist inhaler INHALE ONE PUFF BY MOUTH FOUR TIMES A DAY DUE FOR PHYSICAL       ipratropium-albuteroL (DUO-NEB) 0.5-2.5 mg/3 mL nebulizer Take 3 mL by nebulization 4 (four) times a day as needed (SOB, wheezing). 240 mL 12     lisinopriL (PRINIVIL,ZESTRIL) 20 MG tablet Take 20 mg by mouth.       mometasone-formoterol (DULERA) 200-5 mcg/actuation HFAA inhaler Inhale 2 puffs.       OXYGEN-AIR DELIVERY SYSTEMS [facility] Use As Directed. [employer]- 2 lpm @ rest, 3 w/ activity       tamsulosin (FLOMAX) 0.4 mg cap Take 0.4 mg by mouth.       tiotropium bromide (SPIRIVA RESPIMAT) 2.5 mcg/actuation Mist Inhale 2 puffs daily. 4 g 12     No current facility-administered medications for this visit.      Labs:  Reviewed.    Imaging studies (Unchanged from previous visit):  CTA Chest 3/25/21:  1.  No evidence for pulmonary embolism.   2.  Severe emphysema.  3.  Multiple new pulmonary nodules primarily in the left lung anyone of which could represent a primary lung cancer. Multifocal lung cancer or alternatively an infectious or inflammatory process are also considered. Correlate clinically for infection. Management options include pulmonary consultation, short-term follow-up imaging after treatment, percutaneous biopsy considered less viable options given severe emphysema.    PFT's 4/26/21  FEV1/FVC is 28% and is reduced.  FEV1 is 0.62L (19%) predicted and is reduced.  FVC is 2.17L (52%) predicted and reduced.  No change following administration of bronchodilators.  DLCO is 5.68ml/min/hg (22%) predicted and is reduced when it is corrected for hemoglobin.    Impression:  Full Pulmonary Function Test is abnormal.  PFTs are consistent with very severe obstructive disease.  Spirometry is not consistent with reversibility.  Diffusion capacity when corrected for hemoglobin is severely reduced.  Concern for possible extrathoracic obstruction.    Physical Exam:  /62   Pulse 100   Wt 134 lb (60.8 kg)   SpO2 100%  Comment: 2.5lpm  BMI 18.69 kg/m    Heart Rate: 100  BP: 128/62  SpO2: 100 % (2.5lpm)  Weight: 134 lb (60.8 kg)  Physical Exam   Constitutional: He is oriented to person, place, and time.   Cachectic appearing gentleman, short of breath, mildly distressed.   HENT:   Head: Normocephalic and atraumatic.   Eyes: Pupils are equal, round, and reactive to light.   Neck: Normal range of motion.   Cardiovascular: Normal rate and regular rhythm.   Pulmonary/Chest: He has no wheezes.   Abdominal: Soft.   Musculoskeletal:         General: No edema.   Neurological: He is alert and oriented to person, place, and time.   Skin: Skin is warm.       Assessment and Plan:Camacho Covington is a 60 y.o. with a past medical history significant for emphysema, chronic respiratory failure, HTN, hyperlipidemia and recently discontinued tobacco abuse who presents to clinic today for a follow-up visit for his oxygen dependent very severe COPD and with multiple bilateral pulmonary nodules. For the present we would recommend;    1. Very severe oxygen dependent COPD: Noted on pulmonary function testing with extensive emphysema on chest imaging.  Patient is fairly debilitated and oxygen dependent requiring significant help with his ADLs.  We had a long discussion about what other medications we could use to alleviate his symptom complex and I talked to both the patient and his daughter extensively about the 3 categories of medications we would be able to use including steroids, anti-inflammatory such as azithromycin and morphine for air hunger.  Presently we will:    Continue Dulera 2 puffs twice daily.  Reminded the patient to rinse out his mouth after using this.    Continue Spiriva Respimat 1 puff daily.    Continue duo nebs to use up to 4 times a day as needed for rescue, encouraged him to use a flutter valve at least twice a day with this.    I have initiated him on azithromycin 250 mg q. Monday, Wednesday and Friday.  I was able to review an  EKG that was done in his clinic in [facility] and this demonstrates a normal QT interval.  I have advised him to let any physicians know that he is on azithromycin chronically in the event that he is prescribed other medications that may interact with this.      We will probably obtain yearly EKGs and at his next clinic visit we will set him up with an audiology appointment.    I have initiated him on 5 mg of prednisone daily and will assess his symptoms in 6 weeks.    We had a long discussion about initiation of morphine and I have said that if the aforementioned medications do not help with his symptoms that I would have a low threshold to initiate this for air hunger.    Advised him to have frequent small meals including Ensure as a meal.    I have given him a prescription for doxycycline and prednisone 40 mg a day to keep at home in the event that his breathing acutely worsens.    I have prescribed a course of pulmonary rehab which the patient was initially reticent to do but was convinced by his daughter to register for.  He will wait until this starts up at [facility] next month and will come down from [place] for this.  2. Multiple bilateral pulmonary nodules: Chest imaging demonstrates multiple bilateral pulmonary nodules which given his medical background almost certainly multiple lung cancers.  We discussed the fact that given his extensive emphysema he would not be an easy candidate to biopsy and that he would require multiple biopsies for the multiple sites.  I did discuss his imaging with Dr. Muniz who feels that the patient would be too high risk for any intervention.  This is in keeping with the patient and his daughter's wishes to not have aggressive interventions.  3. HCM: Has received his Covid-19 vaccine.  4. Follow-up: 6 weeks.      Jesica Zelaya  Pulmonary and Critical Care  [id number]

## 2021-06-26 NOTE — PATIENT INSTRUCTIONS - HE
1. We will initiate you on Prednisone 5mg daily.  2. We will initiate you on Azithromycin 250mg every Monday, Wednesday and Friday. I have reviewed your EKG and this looks like it should be ok. If ANYONE STARTS YOU on an antibiotic please be sure to let them know you are chronically on Azithromycin.  3. If this does not alleviate your breathing, we can still add morphine to your daily regimen to help with your air hunger.   4. I have given you a prescription for Doxycycline 100mg twice a day for 5 days in addition to 40mg a day of prednisone. Please let us know if you are unwell and need to start this regimen.  5. Please use your flutter valve twice a day to help bring up secretions.

## 2021-07-04 NOTE — ADDENDUM NOTE
Addendum Note by Gisele Jean RN at 6/9/2021 11:00 AM     Author: Gisele Jean RN Service: -- Author Type: Registered Nurse    Filed: 6/9/2021  1:36 PM Encounter Date: 6/9/2021 Status: Signed    : Gisele Jean RN (Registered Nurse)    Addended by: GISELE JEAN on: 6/9/2021 01:36 PM        Modules accepted: Orders

## 2021-07-21 NOTE — PROGRESS NOTES
CCx: Followup of  Gold stage D COPD    HPI: Mr. Covington is a 60 y.o. gentleman, former smoker, with a past medical history significant for emphysema, chronic respiratory failure, HTN, hyperlipidemia, lung nodules suspicious for cancer is here for follow up.    He feels much better after starting prednisone 5 mg and azithromycin three times a week. He denies needing any higher doses of steroids, or abx for exacerbations.  He is on oxygen 2-3 LPM 24 hours daily. He is also albuterol inhaler and duonebs. He uses the duonebs once or twice a day. He is also on Dulera, spiriva, and combivent. He uses a flutter valve but he feels it doesn't work. He denies any cough. He only rarely wheezes. He is not very active. He walks about 50 feet.     COPD assessment test (CAT) 4/27/2021 6/9/2021 7/21/2021   Cough 2 2 2   Phlegm 1 2 3   Chest tightness 2 3 2   Walk up hill 5 5 5   Limited activities 5 4 4   Leaving my home 5 4 4   Sleep 1 3 4   Energy 4 4 2   Total Score 25 27 26     Recent medical history:  He states that he has had worsening shortness of breath for the last 4 to 5 years and this was initially noted only in the cold and progressed to the point that he was unable to climb a flight of stairs and now has difficulty with his ADLs.  Over the last year or 2 he was initiated on supplemental oxygen as his oxygen saturations at rest were noted to be in the low 80s and these declined further with activity.  He was hospitalized at [facility] in January with acute on chronic hypercapnic, hypercarbic respiratory failure and was treated with intravenous antibiotics, steroids and bilevel ventilation therapy with subsequent improvement in his symptoms.  He was then rehospitalized in March with a similar presentation and treated with a similar regiment which he responded very well.  Both he and his daughter who are here at the visit with him he felt best while on steroids and actually felt quite well after his initial  hospitalization at the beginning of this year.      ROS:  Pertinent positives alluded to in the HPI. Remainder of 10 point ROS is negative.     PMH (Unchanged from previous visit):  1. Emphysema  2. Chronic respiratory failure  3. HTN  4. Hyperlipidemia  5. Tobacco abuse    Allergies:  Reviewed in Epic.    Family HX (Unchanged from previous visit):  1. Mother: RCC  2. Father: CVA, Lung cancer, emphysema    Social Hx (Unchanged from previous visit):  Marital status:   Resides in a trailer, no concern for mold.  Occupational history: Retired    service: No  Pets: 1 dog  Smoking history: 90 pack year history; Quit smoking in January 2021  Alcohol use: No   Recreational drug use: No  Hobbies: No  Recent Travel: No    Current Meds:  Current Outpatient Medications   Medication Sig Dispense Refill     albuterol (VENTOLIN HFA) 108 (90 Base) MCG/ACT inhaler INHALE 2 PUFFS INTO THE LUNGS EVERY 4 HOURS AS NEEDED FOR SHORTNESS OF BREATH, DIFFICULTY BREATHING OR WHEEZING. 18 g 11     amLODIPine (NORVASC) 5 MG tablet TAKE ONE TABLET BY MOUTH ONCE DAILY 90 tablet 3     aspirin 81 MG tablet Take 81 mg by mouth every morning  90 tablet 3     atorvastatin (LIPITOR) 20 MG tablet Take 1 tablet (20 mg) by mouth daily (Patient taking differently: Take 20 mg by mouth every morning ) 90 tablet 3     azithromycin (ZITHROMAX) 250 MG tablet Take 250 mg by mouth Take Mon/Wed/Fri       cyclobenzaprine (FLEXERIL) 10 MG tablet Take 1 tablet (10 mg) by mouth 3 times daily as needed for muscle spasms 30 tablet 5     guaiFENesin (MUCINEX) 600 MG 12 hr tablet Take 1 tablet (600 mg) by mouth 2 times daily 30 tablet 1     ibuprofen (ADVIL/MOTRIN) 200 MG capsule Take 2 capsules (400 mg) by mouth every 4 hours as needed for fever ; do not use while taking both prednisone and aspirin. 120 capsule      ipratropium - albuterol 0.5 mg/2.5 mg/3 mL (DUONEB) 0.5-2.5 (3) MG/3ML neb solution Inhale 3 mLs into the lungs        ipratropium-albuterol (COMBIVENT RESPIMAT)  MCG/ACT inhaler INHALE ONE PUFF BY MOUTH FOUR TIMES A DAY DUE FOR PHYSICAL (Patient taking differently: Inhale 1 puff into the lungs 4 times daily INHALE ONE PUFF BY MOUTH FOUR TIMES A DAY DUE FOR PHYSICAL) 4 g 11     lisinopril (ZESTRIL) 20 MG tablet Take 1 tablet (20 mg) by mouth daily (Patient taking differently: Take 20 mg by mouth every morning ) 90 tablet 3     mometasone-formoterol (DULERA) 200-5 MCG/ACT inhaler Inhale 2 puffs into the lungs 2 times daily 1 Inhaler 11     multivitamin, therapeutic with minerals (MULTI-VITAMIN) TABS tablet Take 1 tablet by mouth every morning  100 tablet 3     order for DME Equipment being ordered: Oxygen with portability. 2 liters per nasal cannula continuous. 1 Device 0     oxyCODONE-acetaminophen (PERCOCET) 5-325 MG tablet Take 1 tablet by mouth every 4 hours as needed for breakthrough pain 2 tablet 0     OXYGEN-HELIUM IN 2 L 2 LPM continuous   [employer]       predniSONE (DELTASONE) 5 MG tablet Take 5 mg by mouth daily       tamsulosin (FLOMAX) 0.4 MG capsule Take 1 capsule (0.4 mg) by mouth At Bedtime 90 capsule 1     tiotropium (SPIRIVA RESPIMAT) 2.5 MCG/ACT inhaler Inhale 2 puffs into the lungs daily       Labs:  Reviewed.    Imaging studies (Unchanged from previous visit):  CTA Chest 3/25/21:  1.  No evidence for pulmonary embolism.   2.  Severe emphysema.  3.  Multiple new pulmonary nodules primarily in the left lung anyone of which could represent a primary lung cancer. Multifocal lung cancer or alternatively an infectious or inflammatory process are also considered. Correlate clinically for infection. Management options include pulmonary consultation, short-term follow-up imaging after treatment, percutaneous biopsy considered less viable options given severe emphysema.    PFT's 4/26/21  FEV1/FVC is 28% and is reduced.  FEV1 is 0.62L (19%) predicted and is reduced.  FVC is 2.17L (52%) predicted and reduced.  No change  following administration of bronchodilators.  DLCO is 5.68ml/min/hg (22%) predicted and is reduced when it is corrected for hemoglobin.    Impression:  Full Pulmonary Function Test is abnormal.  PFTs are consistent with very severe obstructive disease.  Spirometry is not consistent with reversibility.  Diffusion capacity when corrected for hemoglobin is severely reduced.  Concern for possible extrathoracic obstruction.    Physical Exam:  BP (!) 140/68   Pulse 94   Wt 62.6 kg (138 lb 1.6 oz)   SpO2 97%   BMI 19.26 kg/m        Physical Exam   Constitutional: He is oriented to person, place, and time.   Cachectic appearing gentleman, short of breath, mildly distressed.   HENT:   Head: Normocephalic and atraumatic.   Eyes: Pupils are equal, round, and reactive to light.   Neck: Normal range of motion.   Cardiovascular: Normal rate and regular rhythm.   Pulmonary/Chest: He has no wheezes. Very diminished bilaterally  Abdominal: Soft.   Musculoskeletal:         General: No edema.   Neurological: He is alert and oriented to person, place, and time.   Skin: Skin is warm.     Assessment and Plan:Camacho Covington is a 60 y.o. with a past medical history significant for emphysema, chronic respiratory failure, HTN, hyperlipidemia and recently discontinued tobacco abuse who presents to clinic today for a follow-up visit for his oxygen dependent very severe COPD and with multiple bilateral pulmonary nodules. For the present we would recommend;    1. Very severe oxygen dependent COPD: Noted on pulmonary function testing with extensive emphysema on chest imaging.  Patient is fairly debilitated and oxygen dependent requiring significant help with his ADLs.    Presently we will:    Continue Dulera 2 puffs twice daily.  Reminded the patient to rinse out his mouth after using this.    Continue Spiriva Respimat 1 puff daily.    Continue duo nebs to use up to 4 times a day as needed for rescue, encouraged him to use a flutter valve at  least twice a day with this.    Azithromycin 250 mg q. Monday, Wednesday and Friday.     Yearly EKGs, audiometry follow ups.    Also started on 5 mg of prednisone daily. Would continue given the significant improvement since he was last seen by Dr Zelaya.    Advised him to have frequent small meals including Ensure as a meal.    Has prescription for doxycycline and prednisone 40 mg a day to keep at home in the event that his breathing acutely worsens.    pulm rehab importance stressed. Found a place for him in [facility]. He may go but will decide. Encouraged along with his daughter.   2. Multiple bilateral pulmonary nodules: Chest imaging demonstrates multiple bilateral pulmonary nodules which given his medical background almost certainly multiple lung cancers.  We discussed the fact that given his extensive emphysema he would not be an easy candidate to biopsy and that he would require multiple biopsies for the multiple sites.  I did discuss his imaging with Dr. Muniz who feels that the patient would be too high risk for any intervention.  This is in keeping with the patient and his daughter's wishes to not have aggressive interventions.  3. HCM: Has received his Covid-19 vaccine.  4. Follow-up: 3    Carley Nolan MD  7/21/2021

## 2021-08-16 NOTE — TELEPHONE ENCOUNTER
"Phone call from patient's daughter.  States that patient has had some decrease in his sats with activities the past few days and wondering if they should start his action plan?  Sats are 83-88% with some activities.  Com back up to 94% with rest (on oxygen at 2-4LPM) No cough.  Daughter states he gets \"anxious\" and has had some \"clammy\" skin at times and she has noted that his BP was up yesterday 161/86. She attributes this to anxiousness over the breathing.  He did fall out of bed last week as well and was started on a muscle relaxer by his PCP for back pain.      Encouraged her that he should start his action plan:  Prednisone 40mg daily x 5 days and doxycycline.  She states that they have this at home and will get it started today.   Instructed that if these medications are not helpful and sats continue to drop, he will need to be seen in the Ed for evaluation.  "

## 2021-08-18 NOTE — TELEPHONE ENCOUNTER
Emelyn called to see if we could maybe start some MS. To help with his breathing. They had talked about it at their last visit and think it maybe time to try it. Will send a message to Dr. Zelaya. Has started his prednisone and antibiotic and is doing a little better. Instructed to call with any questions or concerns.

## 2021-10-27 NOTE — TELEPHONE ENCOUNTER
Reason for Call:  Other call back    Detailed comments: Pt is in the ER for urinary retention and bladder stones.  Dr. River would like pt seen in urology in the next week to 10 days for catheter removal and bladder stones.    Phone Number Patient can be reached at: Home number on file [phone] (home)    Best Time:     Can we leave a detailed message on this number? Not Applicable    Call taken on 10/27/2021 at 1:13 PM by Jen Oliveira

## 2021-10-27 NOTE — ED NOTES
Noticed patient has erythema on right toes/top of foot with warmth to the touch.  Patient denies pain.  Notified provider. Marked area with skin marker.  Instructed patient/patient's daughter to come back to ER if erythema spreads outside of markings.  Provider evaluated and will send antibiotics to the pharmacy for right foot cellulitis.

## 2021-10-27 NOTE — DISCHARGE INSTRUCTIONS
Lets leave the catheter in for now until he can follow-up in clinic with urology or at least 1 week to remove with your regular primary care provider if you choose to do so.  Push fluids, rest.  Oxycodone as needed for back pain as we discussed.  You should have follow-up appoint with urology with respect to the issues that we identified today not only for the catheter/BPH concerns but bladder calculi/sludge and bladder wall thickening identified on CT today.  Return to the emergency department if worse or changes.

## 2021-10-27 NOTE — TELEPHONE ENCOUNTER
Appointment made 11/15 for the patient since Dr. Sloan is out of office the week before.  Camacho is a patient of Dr. Lowe so he stated he would call his office to see if he can get in sooner there.  He had no questions at this time.    Belén QUIROZ CMA

## 2021-10-27 NOTE — ED PROVIDER NOTES
History     Chief Complaint   Patient presents with     Urinary Retention     HPI  Camacho Covington is a 61 year old male, past medical history is significant for COPD, hypertension, hyperlipidemia, tobacco abuse, BPH, presents with his daughter with concerns of inability to void beginning around 7 PM last night.  By her account this is happened once before, catheter was placed and he was sent to urology.  The catheter was left in for about a week and removed it was felt to be due to BPH and he was placed on Flomax.  That was approximately a year ago and since that time he has not had any issue with difficulties voiding.  He feels like he has a very full bladder is slightly nauseated and very uncomfortable.  He denies any fever chills or sweats.  He has no additional concerns.      Allergies:  No Known Allergies    Problem List:    Patient Active Problem List    Diagnosis Date Noted     COPD exacerbation (H) 03/25/2021     Priority: Medium     Acute on chronic respiratory failure (H) 01/05/2021     Priority: Medium     Pulmonary nodules 11/20/2020     Priority: Medium     Chest CT 3/25/2021:IMPRESSION:  1.  No evidence for pulmonary embolism.   2.  Severe emphysema.  3.  Multiple new pulmonary nodules primarily in the left lung anyone of which could represent a primary lung cancer. Multifocal lung cancer or alternatively an infectious or inflammatory process are also considered. Correlate clinically for infection.   Management options include pulmonary consultation, short-term follow-up imaging after treatment, percutaneous biopsy considered less viable options given severe emphysema.       Skin lesion 09/27/2019     Priority: Medium     Hyponatremia 02/17/2019     Priority: Medium     2/17/2019:His sodium remains low but improved some off the hydrochlorothiazide.  He has high urine sodium and urine osmolality so cause may be related to the hydrochlorothiazide but he may have SIADH.  PLAN: Decrease fluids and  recheck sodium in 2 weeks.  Stay off hydrochlorothiazide.        Pulmonary emphysema (H) 04/06/2018     Priority: Medium     PFT 4/19/2018 results:FVC=59%, FEV1=23%, FEV1/FVC=30%, DLOC=36%.  IMPRESSION:Severe Airflow Obstruction  -Emphysematous Type, Reversible Component.  Possible extrathoracic obstruction or poor inspiratory effort  PFT 10/16/2019 results:FVC=61%, FEV1=22%, FEV1/FVC=29%, DLOC=unable to do.    The FVC, FEV1 and FEV1/FVC ratio are reduced.  The inspiratory flow rates are reduced.  The FVC is reduced relative to the SVC indicating air trapping.  The TLC, RV, FRC and RV/TLC ratio are all increased indicating overinflation and air trapping.  Following administration of bronchodilators, there is a significant response indicated by the increased FVC.  Severe airway obstruction and overinflation are present.  A clinical trial of bronchodilators may be beneficial in view of the airway obstruction.  IMPRESSION:Severe Airflow Obstruction.   Unable to complete DLCO.  Possible extrathoraacic obstruction.          Essential hypertension with goal blood pressure less than 140/90 04/06/2018     Priority: Medium     Hyperlipidemia LDL goal <100 04/06/2018     Priority: Medium     Tobacco abuse 04/06/2018     Priority: Medium        Past Medical History:    Past Medical History:   Diagnosis Date     Essential hypertension with goal blood pressure less than 140/90 4/6/2018     Hyperlipidemia LDL goal <100 4/6/2018     Hyponatremia 2/17/2019     Pulmonary emphysema (H) 4/6/2018     Tobacco abuse 4/6/2018       Past Surgical History:    No past surgical history on file.    Family History:    Family History   Problem Relation Age of Onset     Kidney Cancer Mother      Other Cancer Father         lung     Cerebrovascular Disease Father        Social History:  Marital Status:   [2]  Social History     Tobacco Use     Smoking status: Former Smoker     Packs/day: 0.50     Years: 44.00     Pack years: 22.00      Types: Cigarettes     Quit date: 2021     Years since quittin.8     Smokeless tobacco: Never Used     Tobacco comment: started at age 13,  3-4 cigs daily   Substance Use Topics     Alcohol use: No     Drug use: No        Medications:    oxyCODONE (ROXICODONE) 5 MG tablet  albuterol (VENTOLIN HFA) 108 (90 Base) MCG/ACT inhaler  amLODIPine (NORVASC) 5 MG tablet  aspirin 81 MG tablet  atorvastatin (LIPITOR) 20 MG tablet  azithromycin (ZITHROMAX) 250 MG tablet  cyclobenzaprine (FLEXERIL) 10 MG tablet  guaiFENesin (MUCINEX) 600 MG 12 hr tablet  ibuprofen (ADVIL/MOTRIN) 200 MG capsule  ipratropium - albuterol 0.5 mg/2.5 mg/3 mL (DUONEB) 0.5-2.5 (3) MG/3ML neb solution  ipratropium-albuterol (COMBIVENT RESPIMAT)  MCG/ACT inhaler  lisinopril (ZESTRIL) 20 MG tablet  mometasone-formoterol (DULERA) 200-5 MCG/ACT inhaler  multivitamin, therapeutic with minerals (MULTI-VITAMIN) TABS tablet  order for DME  oxyCODONE-acetaminophen (PERCOCET) 5-325 MG tablet  OXYGEN-HELIUM IN  predniSONE (DELTASONE) 5 MG tablet  tamsulosin (FLOMAX) 0.4 MG capsule  tiotropium (SPIRIVA RESPIMAT) 2.5 MCG/ACT inhaler          Review of Systems   All other systems reviewed and are negative.      Physical Exam   BP: (!) 159/78  Pulse: 101  Temp: 98.2  F (36.8  C)  Resp: 18  SpO2: 97 %      Physical Exam  Vitals and nursing note reviewed.   Constitutional:       Appearance: Normal appearance. He is normal weight.   Abdominal:      General: Bowel sounds are normal.      Palpations: Abdomen is soft.      Comments: The bladder is clinically distended to percussion.  Tender.  Informal bedside ultrasound with the curvilinear probe revealed the bladder to be grossly distended likely in excess of a liter of urine present.   Neurological:      Mental Status: He is alert.         ED Course        Procedures              Critical Care time:  none               Results for orders placed or performed during the hospital encounter of 10/27/21 (from the  past 24 hour(s))   UA with Microscopic reflex to Culture    Specimen: Urine, Smith Catheter   Result Value Ref Range    Color Urine Yellow Colorless, Straw, Light Yellow, Yellow    Appearance Urine Clear Clear    Glucose Urine Negative Negative mg/dL    Bilirubin Urine Negative Negative    Ketones Urine Negative Negative mg/dL    Specific Gravity Urine 1.012 1.003 - 1.035    Blood Urine Negative Negative    pH Urine 7.0 5.0 - 7.0    Protein Albumin Urine Negative Negative mg/dL    Urobilinogen Urine Normal Normal, 2.0 mg/dL    Nitrite Urine Negative Negative    Leukocyte Esterase Urine Negative Negative    Mucus Urine Present (A) None Seen /LPF    Calcium Oxalate Crystals Urine Few (A) None Seen /HPF    RBC Urine 3 (H) <=2 /HPF    WBC Urine 1 <=5 /HPF    Narrative    Urine Culture not indicated   CBC with platelets, differential    Narrative    The following orders were created for panel order CBC with platelets, differential.  Procedure                               Abnormality         Status                     ---------                               -----------         ------                     CBC with platelets and d...[[id number]]  Abnormal            Final result                 Please view results for these tests on the individual orders.   Basic metabolic panel   Result Value Ref Range    Sodium 126 (L) 133 - 144 mmol/L    Potassium 4.6 3.4 - 5.3 mmol/L    Chloride 90 (L) 94 - 109 mmol/L    Carbon Dioxide (CO2) 31 20 - 32 mmol/L    Anion Gap 5 3 - 14 mmol/L    Urea Nitrogen 10 7 - 30 mg/dL    Creatinine 0.37 (L) 0.66 - 1.25 mg/dL    Calcium 9.1 8.5 - 10.1 mg/dL    Glucose 103 (H) 70 - 99 mg/dL    GFR Estimate >90 >60 mL/min/1.73m2   CBC with platelets and differential   Result Value Ref Range    WBC Count 7.6 4.0 - 11.0 10e3/uL    RBC Count 3.22 (L) 4.40 - 5.90 10e6/uL    Hemoglobin 10.0 (L) 13.3 - 17.7 g/dL    Hematocrit 29.7 (L) 40.0 - 53.0 %    MCV 92 78 - 100 fL    MCH 31.1 26.5 - 33.0 pg    MCHC 33.7  31.5 - 36.5 g/dL    RDW 12.2 10.0 - 15.0 %    Platelet Count 254 150 - 450 10e3/uL    % Neutrophils 79 %    % Lymphocytes 12 %    % Monocytes 8 %    % Eosinophils 1 %    % Basophils 0 %    % Immature Granulocytes 0 %    NRBCs per 100 WBC 0 <1 /100    Absolute Neutrophils 6.0 1.6 - 8.3 10e3/uL    Absolute Lymphocytes 0.9 0.8 - 5.3 10e3/uL    Absolute Monocytes 0.6 0.0 - 1.3 10e3/uL    Absolute Eosinophils 0.1 0.0 - 0.7 10e3/uL    Absolute Basophils 0.0 0.0 - 0.2 10e3/uL    Absolute Immature Granulocytes 0.0 <=0.0 10e3/uL    Absolute NRBCs 0.0 10e3/uL   Abd/pelvis CT - no contrast - Stone Protocol    Narrative    CT ABDOMEN PELVIS W/O CONTRAST 10/27/2021 12:24 PM    CLINICAL HISTORY: R flank pain  TECHNIQUE: CT scan of the abdomen and pelvis was performed without IV  contrast. Multiplanar reformats were obtained. Dose reduction  techniques were used.  CONTRAST: None.    COMPARISON: CT abdomen and pelvis 4/3/2021    FINDINGS:   LOWER CHEST: Development of ill-defined noncalcified nodular opacities  in the lung bases with a reference nodule in the posterior right lower  lobe measuring 7 mm series 2/10. Moderate centrilobular emphysema.    HEPATOBILIARY: Unenhanced contours of the liver are grossly  unremarkable. Gallbladder grossly unremarkable.    PANCREAS: Unenhanced contours are grossly unremarkable.    SPLEEN: Normal in size.    ADRENAL GLANDS: Normal.    KIDNEYS/BLADDER: No hydronephrosis or intrarenal calculi.    Evaluation of ureteral calculi is limited due to motion. No  hydroureteronephrosis.    Moderate circumferential mural thickening of the urinary bladder.  There is a Smith catheter in place which likely accounts for the  nondependent in a bladder gas. There are linear calcifications at the  dependent urinary bladder    BOWEL: Large retained colonic stool. No bowel obstruction.    LYMPH NODES: Markedly limited in evaluation due to motion and lack of  intravenous contrast.    VASCULATURE: Tortuous normal  caliber abdominal aorta with severe  aortoiliac calcified atheromatous plaque.    PELVIC ORGANS: Enlargement of the prostate.    OTHER: None.    MUSCULOSKELETAL: Development of moderate superior endplate compression  fracture with linear sclerosis likely due to trabecular compression  and minimal bony retropulsion. Age-indeterminate mild to moderate T11  compression deformity with minimal bony retropulsion, development of  severe central compression deformity without bony retropulsion L1,  development of moderate central compression deformity L2 without  significant bony retropulsion, development of mild superior endplate  compression deformities at L4 and L5 without bony retropulsion.  Diffuse bony demineralization.      Impression    IMPRESSION:   1.  No hydroureteronephrosis or intrarenal calculi. Note that  evaluation for ureteral calculi is limited due to patient motion.  2.  Nonobstructive urinary bladder calculi.  3.  Moderate urinary bladder wall thickening, cystitis not excluded.  4.  Development of multiple lower thoracic and lumbar compression  deformities T11, L1, L2, L4 and L5.  5.  Development of a few noncalcified nodular opacities in the  visualized lung bases indeterminant between infectious/inflammatory  nodules or pulmonary metastatic disease from an unknown primary.  Short-term CT follow-up is recommended to assess for  stability/resolution.  6.  Prostate enlargement. Correlation with prostate-specific antigen.       YOU COWAN MD         SYSTEM ID:  [id number]       Medications   lidocaine (XYLOCAINE) 2 % external gel ( Topical Given 10/27/21 0713)   10:13 AM  Much improved.  Proximately 1300 cc of urine which is dark tomasa and faintly red in the collection bag.  His urinalysis is reviewed above in the room with him.  I reviewed his CT scan from 4/21 as well.  He had no evidence of stone disease at that time except for stone type sediment in his bladder at the time.  On further history  taking at this time with the patient and his daughter he notes that he has had right flank pain that he thought was muscular seems worse with movement over the last 2 to 3 weeks has been using a muscle relaxer for it.  Cannot remember the name of it but this could certainly be interfering with normal bladder emptying and may be contributing to his presentation today.  The other consideration will be for the potential development of stone disease and potentially an obstructing right ureteral stone.  I recommended the patient undergo CT noncontrast for that reason as well as checking his renal function and basic today.  1:06 PM  We carefully reviewed the abnormalities identified on CT today.  He does appear to have some bladder wall thickening but his urinalysis and his symptoms are inconsistent with cystitis.  I think that this definitely needs to be followed up with urology given his age and comorbidities.  Additionally he appears to have some small bladder calculi which are nonobstructing or bladder sludge.  Additionally finding evidence of indeterminate aged compression fractures in the low thoracic/lumbar.  This would correlate with the patient's back pain, his daughter feels that he is a very active dreamer and sleeper he has actually fallen out of bed on occasion since April.  They do not recall any other fall or injury so this is probably what produced his low back fractures and the attendant pain that he describes.  I like him to discontinue the use of the muscle relaxer for a variety of reasons, it does not seem to work that well in any event and I think it may be playing a role for bladder function in the context of his BPH.  I will prescribe pain medication for him in the form of oxycodone which she will use sparingly and follow-up in clinic with his primary care provider.  He will have urology follow-up as well and will leave the Smith catheter in for now at least a week or until he is seen in clinic with  urology.      Assessments & Plan (with Medical Decision Making)   Assessments and plan with medical decision making at the time stamp above.      Disclaimer: This note consists of symbols derived from keyboarding, dictation and/or voice recognition software. As a result, there may be errors in the script that have gone undetected. Please consider this when interpreting information found in this chart.      I have reviewed the nursing notes.    I have reviewed the findings, diagnosis, plan and need for follow up with the patient.       New Prescriptions    OXYCODONE (ROXICODONE) 5 MG TABLET    Take 1 tablet (5 mg) by mouth every 6 hours as needed for pain       Final diagnoses:   Urinary retention - Multifactorial; medication and BPH related   Bladder stones   Right-sided low back pain without sciatica, unspecified chronicity       10/27/2021   [facility] EMERGENCY DEPT     Norman River MD  10/27/21 7542

## 2021-10-28 NOTE — PROGRESS NOTES
Clinic Care Coordination Contact    Background: Care Coordination referral placed from [facility] discharge report for reason of patient meeting criteria for a TCM outreach call by [facility] team.    Assessment: Upon chart review, [facility] Team member will cancel/close the referral for TCM outreach due to reason below:     Patient has a follow up appointment with an appropriate provider today for hospital discharge.       Plan: Care Coordination referral for TCM outreach canceled.    Brenda Gonzalez MA  [facility], [facility]

## 2021-10-28 NOTE — TELEPHONE ENCOUNTER
Reason for Call:  Same Day Appointment, Requested Provider:  Dr Lowe    PCP: Theo Ponce    Reason for visit: Bladder stones, Urinary Retention with Indwelling sams, F/U ER     Duration of symptoms:     Have you been treated for this in the past? Yes    Additional comments: wondering if Dr Lowe would work him in with in 7-10 days in [place]    Can we leave a detailed message on this number? YES    Phone number patient can be reached at: [facility] [phone]    Best Time: any    Call taken on 10/28/2021 at 10:21 AM by Shantel Sanchez

## 2021-10-28 NOTE — TELEPHONE ENCOUNTER
Called [place] and got verbal consent to communicate with daughter kari. Called Kari and let her know a VRT appt is appropriate. scheduled for next week on the 4th.    Sarina EDGAR RN Specialty Triage 10/28/2021 11:45 AM

## 2021-11-04 NOTE — PROGRESS NOTES
Camacho is a 61 year old who is being evaluated via a billable video visit.      How would you like to obtain your AVS? MyChart  If the video visit is dropped, the invitation should be resent by: Text to cell phone:    Will anyone else be joining your video visit? No      Video Start Time: 945    Assessment & Plan   Problem List Items Addressed This Visit     None      Visit Diagnoses     Benign prostatic hyperplasia with urinary retention    -  Primary    Bladder stones        Bladder wall thickening               Review of external notes as documented elsewhere in note  Review of the result(s) of each unique test - CT scan  30 minutes spent on the date of the encounter doing chart review, history and exam, documentation and further activities per the note       See Patient Instructions    No follow-ups on file.    Moiz Lowe MD  [facility] [place] Sauer is a 61 year old who presents for the following health issues retention of urine    HPI       Patient is a 61-year-old male with history of urine retention in the past.  He was seen in emergency room recently with urinary retention again.  Smith catheter was placed with 1.3 L of urine drained.  He is on Flomax.  CT scan showed thickening of bladder wall along with bladder stones.  He is a poor surgical candidate due to his COPD.  Patient is here with his daughter.    Review of Systems   Constitutional, HEENT, cardiovascular, pulmonary, gi and gu systems are negative, except as otherwise noted.      Objective    Vitals - Patient Reported  Pain Score: No Pain (0)        Physical Exam   GENERAL: Healthy, alert and no distress  EYES: Eyes grossly normal to inspection.  No discharge or erythema, or obvious scleral/conjunctival abnormalities.  RESP: No audible wheeze, cough, or visible cyanosis.  No visible retractions or increased work of breathing.    SKIN: Visible skin clear. No significant rash, abnormal pigmentation or  lesions.  NEURO: Cranial nerves grossly intact.  Mentation and speech appropriate for age.  PSYCH: Mentation appears normal, affect normal/bright, judgement and insight intact, normal speech and appearance well-groomed.    61-year-old gentleman with recurrent retention with bladder stone and bladder wall thickening found on recent CT scan.  Patient is a poor surgical candidate.  Treatment option discussed with patient today.  I will have patient come the office tomorrow for cystoscopy and instruction how to do self intermittent catheterization.  I discussed Rezum with patient as an option since it does not require any anesthetic.  However it does take several months before it can work.            Video-Visit Details    Type of service:  Video Visit    Video End Time:10:01 AM    Originating Location (pt. Location): Home    Distant Location (provider location):  [facility] [place]     Platform used for Video Visit: [employer]

## 2021-11-04 NOTE — LETTER
11/4/2021       RE: Camacho Covington  [address]  [address]  [facility] [address]     Dear Colleague,    Thank you for referring your patient, Camacho Covington, to the [facility] [place] at [facility]. Please see a copy of my visit note below.    Camacho is a 61 year old who is being evaluated via a billable video visit.      How would you like to obtain your AVS? MyChart  If the video visit is dropped, the invitation should be resent by: Text to cell phone:    Will anyone else be joining your video visit? No      Video Start Time: 945    Assessment & Plan   Problem List Items Addressed This Visit     None      Visit Diagnoses     Benign prostatic hyperplasia with urinary retention    -  Primary    Bladder stones        Bladder wall thickening               Review of external notes as documented elsewhere in note  Review of the result(s) of each unique test - CT scan  30 minutes spent on the date of the encounter doing chart review, history and exam, documentation and further activities per the note       See Patient Instructions    No follow-ups on file.    Moiz Lowe MD  [facility]OLOG[facility] [place]    Subjective   Camacho is a 61 year old who presents for the following health issues retention of urine    HPI     Patient is a 61-year-old male with history of urine retention in the past.  He was seen in emergency room recently with urinary retention again.  Smith catheter was placed with 1.3 L of urine drained.  He is on Flomax.  CT scan showed thickening of bladder wall along with bladder stones.  He is a poor surgical candidate due to his COPD.  Patient is here with his daughter.    Review of Systems   Constitutional, HEENT, cardiovascular, pulmonary, gi and gu systems are negative, except as otherwise noted.      Objective    Vitals - Patient Reported  Pain Score: No Pain (0)    Physical Exam   GENERAL: Healthy, alert and no  distress  EYES: Eyes grossly normal to inspection.  No discharge or erythema, or obvious scleral/conjunctival abnormalities.  RESP: No audible wheeze, cough, or visible cyanosis.  No visible retractions or increased work of breathing.    SKIN: Visible skin clear. No significant rash, abnormal pigmentation or lesions.  NEURO: Cranial nerves grossly intact.  Mentation and speech appropriate for age.  PSYCH: Mentation appears normal, affect normal/bright, judgement and insight intact, normal speech and appearance well-groomed.    61-year-old gentleman with recurrent retention with bladder stone and bladder wall thickening found on recent CT scan.  Patient is a poor surgical candidate.  Treatment option discussed with patient today.  I will have patient come the office tomorrow for cystoscopy and instruction how to do self intermittent catheterization.  I discussed Rezum with patient as an option since it does not require any anesthetic.  However it does take several months before it can work.    Video-Visit Details    Type of service:  Video Visit    Video End Time:10:01 AM    Originating Location (pt. Location): Home    Distant Location (provider location):  [facility] [place]     Platform used for Video Visit: [employer]

## 2021-11-05 NOTE — PROGRESS NOTES
S: Camacho Covington is a 61 year old male returns for incomplete voiding.    Patient is draped and prepped.  Flexible cystoscopy placed under direct vision.      The anterior urethra is normal   The prostatic urethra showed bilateral lobe enlargement.     The length is 2cm,  the coaptation is 2 cm.     In the bladder there is trabeculation grade 2 with small bladder stones seen.    Assessment/Plan:  (N40.1,  R33.8) Benign prostatic hyperplasia with urinary retention  (primary encounter diagnosis)  Comment:    Plan: cont with flomax           Add avodart           Patient taught how to do self cath prn           Poor surgical candidate               (N21.0) Bladder stones  Comment:    Plan: CYSTOURETHROSCOPY (26244)           Prn     (N32.89) Bladder wall thickening  Comment:    Plan: CYSTOURETHROSCOPY (77553)        Prn.

## 2021-11-08 NOTE — LETTER
"    11/8/2021         RE: Camacho Covington  [address]  [address]  [facility] [address]        Dear Colleague,    Thank you for referring your patient, Camacho Covington, to the [facility]. Please see a copy of my visit note below.    Camacho is a 61 year old who is being evaluated via a billable video visit.      How would you like to obtain your AVS? MyChart  If the video visit is dropped, the invitation should be resent by: Send to e-mail at: [email or web address]  Will anyone else be joining your video visit? Yes Emelyn (dtr)  {If patient encounters technical issues they should call [phone] :[id number]}    Video Start Time: {video visit start/end time for provider to select:246406}    {PROVIDER CHARTING PREFERENCE:574177}    Subjective   Camacho is a 61 year old who presents for the following health issues {ACCOMPANIED BY STATEMENT (Optional):465535}    HPI       Hospital Follow-up Visit:    Hospital/Nursing Home/IP Rehab Facility: {INPT AND SNF DISCHARGE:064888}  Date of Admission: ***  Date of Discharge: ***  Reason(s) for Admission: ***      Was your hospitalization related to COVID-19? {Yes add questions_No:582542}  Problems taking medications regularly:  {NONE DEFAULTED:196526::\"None\"}  Medication changes since discharge: {NONE DEFAULTED:383190::\"None\"}  Problems adhering to non-medication therapy:  {NONE DEFAULTED:629339::\"None\"}    Summary of hospitalization:  {HOSPITAL DISCHARGE SUMMARY INFO:606029::\"[facility] hospital discharge summary reviewed\"}  Diagnostic Tests/Treatments reviewed.  Follow up needed: {NONE DEFAULTED:199537::\"none\"}  Other Healthcare Providers Involved in Patient s Care:         {those currently involved after discharge:604135::\"None\"}  Update since discharge: {IMPROVED DEFAULT:929379::\"improved.\"} {TIP  Include information from family/caregivers, SNF, Care Coordination :789006}      Post Discharge Medication Reconciliation: {ACO Med Rec (Provider):555040}.  Plan " "of care communicated with {Communicate Plan to:512797::\"patient\"}     {Reference  Coding guidelines- Moderate Complexity F2F/Video within 7 - 14 days of discharge [id number], High Complexity F2F/Video within 7 days 7104103 or [id number] days [id number] :951454}           Review of Systems   {ROS COMP (Optional):356973}      Objective           Vitals:  No vitals were obtained today due to virtual visit.    Physical Exam   {video visit exam brief selected:316658::\"GENERAL: Healthy, alert and no distress\",\"EYES: Eyes grossly normal to inspection.  No discharge or erythema, or obvious scleral/conjunctival abnormalities.\",\"RESP: No audible wheeze, cough, or visible cyanosis.  No visible retractions or increased work of breathing.  \",\"SKIN: Visible skin clear. No significant rash, abnormal pigmentation or lesions.\",\"NEURO: Cranial nerves grossly intact.  Mentation and speech appropriate for age.\",\"PSYCH: Mentation appears normal, affect normal/bright, judgement and insight intact, normal speech and appearance well-groomed.\"}    {Diagnostic Test Results (Optional):694215}    {AMBULATORY ATTESTATION (Optional):024068}        Video-Visit Details    Type of service:  Video Visit    Video End Time:{video visit start/end time for provider to select:645482}    Originating Location (pt. Location): {video visit patient location:025130::\"Home\"}    Distant Location (provider location):  [facility]     Platform used for Video Visit: {Virtual Visit Platforms:457156::\"[email or web address]\"}    Pulmonary Clinic Follow-up Visit    Assessment/Plan:  60 year old male with a past medical history significant for tobacco dependence in remission, COPD, chronic respiratory failure, HTN, hyperlipidemia, presenting for follow-up.    COPD: Severe oxygen-dependent COPD with frequent exacerbations. Frequent need for prednisone and now on prednisone 5 mg daily in addition to inhaled triple-therapy and thrice-weekly macrolide. Also on morphine for " air hunger and indicates a desire not to undergo invasive life support (DNR/DNI). Currently has very low energy all the time. Some phlegm buildup. Took doxycycline and prednisone burst which helped transiently. Gets short of breath quickly. With activity, SpO2 drops to 80s even on oxygen, rests and recovers. With coughing, oxygen level comes up. Has to force himself to cough. States he feels too weak and has insufficient transportation to pursue pulmonary rehabilitation.    Plan:  - prednisone 16-day taper, then continue 10 mg daily until follow-up with Dr. Zelaya  - continue mometasone-formoterol 200-5 mcg two inhalations BID; rinse/gargle/spit water after use  - continue tiotropium respimat 2.5 mcg two inhalations daily  - continue azithromycin 250 mg MWF  - continue oxygen  - continue morphine 2.5 mg every 4 hrs as needed for air hunger; may need to increase and consider home hospice at some point in the near future  - referral to pulmonary palliative care  - patient indicates a desire not to undergo invasive life support in the event of critical illness (DNR/DNI)  - completed COVID-19 vaccination (Moderna)  - annual influenza vaccination  - up to date on pneumococcal vaccination  - folow up with Dr. Zelaya in 6 weeks  - encouraged him to call any time with questions or concerning symptoms    Josh Covington MD  Pulmonary and Critical Care Medicine  [facility]  Office [phone]  Pager [phone]  (he/him/his)    CCx: COPD    HPI: 60 year old male with a past medical history significant for tobacco dependence in remission, COPD, chronic respiratory failure, HTN, hyperlipidemia, presenting for follow-up. Severe oxygen-dependent COPD with frequent exacerbations. Frequent need for prednisone and now on prednisone 5 mg daily in addition to inhaled triple-therapy and thrice-weekly macrolide. Also on morphine for air hunger and indicates a desire not to undergo invasive life support (DNR/DNI).  Currently has very low energy all the time. Some phlegm buildup. Took doxycycline and prednisone burst which helped transiently. Gets short of breath quickly. With activity, SpO2 drops to 80s even on oxygen, rests and recovers. With coughing, oxygen level comes up. Has to force himself to cough. States he feels too weak and has insufficient transportation to pursue pulmonary rehabilitation.    ROS:  A 12-system review was obtained and was negative with the exception of the symptoms endorsed in the history of present illness.    PMH:  Past Medical History:   Diagnosis Date     Essential hypertension with goal blood pressure less than 140/90 4/6/2018     Hyperlipidemia LDL goal <100 4/6/2018     Hyponatremia 2/17/2019 2/17/2019:His sodium remains low but improved some off the hydrochlorothiazide. He has high urine sodium and urine osmolality so cause may be related to the hydrochlorothiazide but he may have SIADH. PLAN: Decrease fluids and recheck sodium in 2 weeks.  Stay off hydrochlorothiazide.      Mumps      Pulmonary emphysema (H) 4/6/2018    PFT 4/19/2018 results:FVC=59%, FEV1=23%, FEV1/FVC=30%, DLOC=36%. IMPRESSION:Severe Airflow Obstruction  -Emphysematous Type, Reversible Component.  Possible extrathoracic obstruction or poor inspiratory effort PFT 10/16/2019 results:FVC=61%, FEV1=22%, FEV1/FVC=29%, DLOC=unable to do.   The FVC, FEV1 and FEV1/FVC ratio are reduced.  The inspiratory flow rates are reduced.  The FVC is reduced relati     Tobacco abuse 4/6/2018       PSH:  No past surgical history on file.    Allergies:  No Known Allergies    Family HX:  Family History   Problem Relation Age of Onset     Kidney Cancer Mother      Other Cancer Father         lung     Cerebrovascular Disease Father        Social Hx:  Social History     Socioeconomic History     Marital status:      Spouse name: Not on file     Number of children: Not on file     Years of education: Not on file     Highest education  level: Not on file   Occupational History     Not on file   Tobacco Use     Smoking status: Former Smoker     Packs/day: 0.50     Years: 44.00     Pack years: 22.00     Types: Cigarettes     Quit date: 2021     Years since quittin.8     Smokeless tobacco: Never Used     Tobacco comment: started at age 13,  3-4 cigs daily   Substance and Sexual Activity     Alcohol use: No     Drug use: No     Sexual activity: Yes     Partners: Female   Other Topics Concern     Parent/sibling w/ CABG, MI or angioplasty before 65F 55M? Not Asked   Social History Narrative     Not on file     Social Determinants of Health     Financial Resource Strain: Not on file   Food Insecurity: Not on file   Transportation Needs: Not on file   Physical Activity: Not on file   Stress: Not on file   Social Connections: Not on file   Intimate Partner Violence: Not on file   Housing Stability: Not on file       Current Meds:  Current Outpatient Medications   Medication Sig Dispense Refill     albuterol (VENTOLIN HFA) 108 (90 Base) MCG/ACT inhaler INHALE 2 PUFFS INTO THE LUNGS EVERY 4 HOURS AS NEEDED FOR SHORTNESS OF BREATH, DIFFICULTY BREATHING OR WHEEZING. 18 g 11     aspirin 81 MG tablet Take 81 mg by mouth every morning  90 tablet 3     atorvastatin (LIPITOR) 20 MG tablet Take 1 tablet (20 mg) by mouth daily (Patient taking differently: Take 20 mg by mouth every morning ) 90 tablet 3     azithromycin (ZITHROMAX) 250 MG tablet Take 250 mg by mouth Take Mon/Wed/Fri       cyclobenzaprine (FLEXERIL) 10 MG tablet Take 1 tablet (10 mg) by mouth 3 times daily as needed for muscle spasms 30 tablet 5     dutasteride (AVODART) 0.5 MG capsule Take 1 capsule (0.5 mg) by mouth daily 90 capsule 3     guaiFENesin (MUCINEX) 600 MG 12 hr tablet Take 1 tablet (600 mg) by mouth 2 times daily 30 tablet 1     ibuprofen (ADVIL/MOTRIN) 200 MG capsule Take 2 capsules (400 mg) by mouth every 4 hours as needed for fever ; do not use while taking both prednisone and  aspirin. 120 capsule      ipratropium - albuterol 0.5 mg/2.5 mg/3 mL (DUONEB) 0.5-2.5 (3) MG/3ML neb solution Inhale 3 mLs into the lungs       ipratropium-albuterol (COMBIVENT RESPIMAT)  MCG/ACT inhaler INHALE ONE PUFF BY MOUTH FOUR TIMES A DAY DUE FOR PHYSICAL (Patient taking differently: Inhale 1 puff into the lungs 4 times daily INHALE ONE PUFF BY MOUTH FOUR TIMES A DAY DUE FOR PHYSICAL) 4 g 11     lisinopril (ZESTRIL) 20 MG tablet Take 1 tablet (20 mg) by mouth daily (Patient taking differently: Take 20 mg by mouth every morning ) 90 tablet 3     mometasone-formoterol (DULERA) 200-5 MCG/ACT inhaler Inhale 2 puffs into the lungs 2 times daily 1 Inhaler 11     multivitamin, therapeutic with minerals (MULTI-VITAMIN) TABS tablet Take 1 tablet by mouth every morning  100 tablet 3     order for DME Equipment being ordered: Oxygen with portability. 2 liters per nasal cannula continuous. 1 Device 0     oxyCODONE (ROXICODONE) 5 MG tablet Take 1 tablet (5 mg) by mouth every 6 hours as needed for pain 12 tablet 0     oxyCODONE-acetaminophen (PERCOCET) 5-325 MG tablet Take 1 tablet by mouth every 4 hours as needed for breakthrough pain 2 tablet 0     OXYGEN-HELIUM IN 2 L 2 LPM continuous   [employer]       predniSONE (DELTASONE) 5 MG tablet Take 5 mg by mouth daily       tamsulosin (FLOMAX) 0.4 MG capsule TAKE 1 CAPSULE (0.4 MG) BY MOUTH AT BEDTIME 90 capsule 1     tiotropium (SPIRIVA RESPIMAT) 2.5 MCG/ACT inhaler Inhale 2 puffs into the lungs daily         Physical Exam:  no exam due to virtual visit    Labs:  reviewed    Imaging studies:  CT chest (March 2021):  - images directly reviewed, formal interpretation follows:  FINDINGS:  ANGIOGRAM CHEST: No evidence for pulmonary embolism. Pulmonary arteries appear mildly dilated. Thoracic aorta normal in caliber. No aortic dissection or other acute abnormality.     HEART: Cardiac chambers within normal limits. No pericardial effusion. Severe coronary artery  calcification.     LUNGS AND PLEURA: Severe emphysema.      There are multiple pulmonary nodules:  Stellate and slightly spiculated left upper lobe pulmonary nodule measures 1.2 cm long on sagittal 150, by 1.7 cm AP by 1.3 cm transverse on axial 39.      Superior and posterior to this dominant nodule is a second nodule which measures 9 mm long on sagittal 140, by 11 mm transversely by 9 mm AP on axial image 34.      Within the superior lingula there is a more definitively spiculated nodule measuring 20 mm AP by 9 mm transversely on axial image 71 and 13 mm long on sagittal image 153.      There is an area of pleural thickening superiorly best seen on sagittal image 154 and axial image 59, which is approximately 10 mm diameter by 36 mm in length.      Slightly nodular focus of consolidation is noted in the inferior lingula anteriorly on axial 90.     In the right lower lobe just above the diaphragm there is a spiculated nodule measuring 14 x 7 mm on axial image 145 x 5 mm long on sagittal 72.      No pleural effusion or pneumothorax.     MEDIASTINUM: There are borderline enlarged mediastinal and hilar lymph nodes, most of which, however, show some degree of coarse calcification consistent with prior granulomatous process.     LIMITED UPPER ABDOMEN: Negative.     MUSCULOSKELETAL: Mild vertebral compression deformities are noted at T6 and T10 with sclerosis along the superior endplate. Diffuse osteopenia. No suspicious bone lesion.                                                                      IMPRESSION:  1.  No evidence for pulmonary embolism.   2.  Severe emphysema.  3.  Multiple new pulmonary nodules primarily in the left lung anyone of which could represent a primary lung cancer. Multifocal lung cancer or alternatively an infectious or inflammatory process are also considered. Correlate clinically for infection.   Management options include pulmonary consultation, short-term follow-up imaging after  treatment, percutaneous biopsy considered less viable options given severe emphysema.    Pulmonary Function Testing  April 2021:  FEV1 0.62 L  Very severe obstruction  DLCOc 22%      Again, thank you for allowing me to participate in the care of your patient.        Sincerely,        Josh Covington MD

## 2021-11-08 NOTE — PROGRESS NOTES
Pulmonary Clinic Follow-up Visit    Assessment/Plan:  60 year old male with a past medical history significant for tobacco dependence in remission, COPD, chronic respiratory failure, HTN, hyperlipidemia, presenting for follow-up.    COPD: Severe oxygen-dependent COPD with frequent exacerbations. Frequent need for prednisone and now on prednisone 5 mg daily in addition to inhaled triple-therapy and thrice-weekly macrolide. Also on morphine for air hunger and indicates a desire not to undergo invasive life support (DNR/DNI). Currently has very low energy all the time. Some phlegm buildup. Took doxycycline and prednisone burst which helped transiently. Gets short of breath quickly. With activity, SpO2 drops to 80s even on oxygen, rests and recovers. With coughing, oxygen level comes up. Has to force himself to cough. States he feels too weak and has insufficient transportation to pursue pulmonary rehabilitation.    Plan:  - prednisone 16-day taper, then continue 10 mg daily until follow-up with Dr. Zelaya  - continue mometasone-formoterol 200-5 mcg two inhalations BID; rinse/gargle/spit water after use  - continue tiotropium respimat 2.5 mcg two inhalations daily  - continue azithromycin 250 mg MWF  - continue oxygen  - continue morphine 2.5 mg every 4 hrs as needed for air hunger; may need to increase and consider home hospice at some point in the near future  - referral to pulmonary palliative care  - patient indicates a desire not to undergo invasive life support in the event of critical illness (DNR/DNI)  - completed COVID-19 vaccination (Moderna)  - annual influenza vaccination  - up to date on pneumococcal vaccination  - folow up with Dr. Zelaya in 6 weeks  - encouraged him to call any time with questions or concerning symptoms    Josh Covington MD  Pulmonary and Critical Care Medicine  [facility]  Office [phone]  Pager [phone]  (he/him/his)    CCx: COPD    HPI: 60 year old male with a  past medical history significant for tobacco dependence in remission, COPD, chronic respiratory failure, HTN, hyperlipidemia, presenting for follow-up. Severe oxygen-dependent COPD with frequent exacerbations. Frequent need for prednisone and now on prednisone 5 mg daily in addition to inhaled triple-therapy and thrice-weekly macrolide. Also on morphine for air hunger and indicates a desire not to undergo invasive life support (DNR/DNI). Currently has very low energy all the time. Some phlegm buildup. Took doxycycline and prednisone burst which helped transiently. Gets short of breath quickly. With activity, SpO2 drops to 80s even on oxygen, rests and recovers. With coughing, oxygen level comes up. Has to force himself to cough. States he feels too weak and has insufficient transportation to pursue pulmonary rehabilitation.    ROS:  A 12-system review was obtained and was negative with the exception of the symptoms endorsed in the history of present illness.    PMH:  Past Medical History:   Diagnosis Date     Essential hypertension with goal blood pressure less than 140/90 4/6/2018     Hyperlipidemia LDL goal <100 4/6/2018     Hyponatremia 2/17/2019 2/17/2019:His sodium remains low but improved some off the hydrochlorothiazide. He has high urine sodium and urine osmolality so cause may be related to the hydrochlorothiazide but he may have SIADH. PLAN: Decrease fluids and recheck sodium in 2 weeks.  Stay off hydrochlorothiazide.      Mumps      Pulmonary emphysema (H) 4/6/2018    PFT 4/19/2018 results:FVC=59%, FEV1=23%, FEV1/FVC=30%, DLOC=36%. IMPRESSION:Severe Airflow Obstruction  -Emphysematous Type, Reversible Component.  Possible extrathoracic obstruction or poor inspiratory effort PFT 10/16/2019 results:FVC=61%, FEV1=22%, FEV1/FVC=29%, DLOC=unable to do.   The FVC, FEV1 and FEV1/FVC ratio are reduced.  The inspiratory flow rates are reduced.  The FVC is reduced relati     Tobacco abuse 4/6/2018       PSH:  No  past surgical history on file.    Allergies:  No Known Allergies    Family HX:  Family History   Problem Relation Age of Onset     Kidney Cancer Mother      Other Cancer Father         lung     Cerebrovascular Disease Father        Social Hx:  Social History     Socioeconomic History     Marital status:      Spouse name: Not on file     Number of children: Not on file     Years of education: Not on file     Highest education level: Not on file   Occupational History     Not on file   Tobacco Use     Smoking status: Former Smoker     Packs/day: 0.50     Years: 44.00     Pack years: 22.00     Types: Cigarettes     Quit date: 2021     Years since quittin.8     Smokeless tobacco: Never Used     Tobacco comment: started at age 13,  3-4 cigs daily   Substance and Sexual Activity     Alcohol use: No     Drug use: No     Sexual activity: Yes     Partners: Female   Other Topics Concern     Parent/sibling w/ CABG, MI or angioplasty before 65F 55M? Not Asked   Social History Narrative     Not on file     Social Determinants of Health     Financial Resource Strain: Not on file   Food Insecurity: Not on file   Transportation Needs: Not on file   Physical Activity: Not on file   Stress: Not on file   Social Connections: Not on file   Intimate Partner Violence: Not on file   Housing Stability: Not on file       Current Meds:  Current Outpatient Medications   Medication Sig Dispense Refill     albuterol (VENTOLIN HFA) 108 (90 Base) MCG/ACT inhaler INHALE 2 PUFFS INTO THE LUNGS EVERY 4 HOURS AS NEEDED FOR SHORTNESS OF BREATH, DIFFICULTY BREATHING OR WHEEZING. 18 g 11     aspirin 81 MG tablet Take 81 mg by mouth every morning  90 tablet 3     atorvastatin (LIPITOR) 20 MG tablet Take 1 tablet (20 mg) by mouth daily (Patient taking differently: Take 20 mg by mouth every morning ) 90 tablet 3     azithromycin (ZITHROMAX) 250 MG tablet Take 250 mg by mouth Take Mon/Wed/Fri       cyclobenzaprine (FLEXERIL) 10 MG tablet Take  1 tablet (10 mg) by mouth 3 times daily as needed for muscle spasms 30 tablet 5     dutasteride (AVODART) 0.5 MG capsule Take 1 capsule (0.5 mg) by mouth daily 90 capsule 3     guaiFENesin (MUCINEX) 600 MG 12 hr tablet Take 1 tablet (600 mg) by mouth 2 times daily 30 tablet 1     ibuprofen (ADVIL/MOTRIN) 200 MG capsule Take 2 capsules (400 mg) by mouth every 4 hours as needed for fever ; do not use while taking both prednisone and aspirin. 120 capsule      ipratropium - albuterol 0.5 mg/2.5 mg/3 mL (DUONEB) 0.5-2.5 (3) MG/3ML neb solution Inhale 3 mLs into the lungs       ipratropium-albuterol (COMBIVENT RESPIMAT)  MCG/ACT inhaler INHALE ONE PUFF BY MOUTH FOUR TIMES A DAY DUE FOR PHYSICAL (Patient taking differently: Inhale 1 puff into the lungs 4 times daily INHALE ONE PUFF BY MOUTH FOUR TIMES A DAY DUE FOR PHYSICAL) 4 g 11     lisinopril (ZESTRIL) 20 MG tablet Take 1 tablet (20 mg) by mouth daily (Patient taking differently: Take 20 mg by mouth every morning ) 90 tablet 3     mometasone-formoterol (DULERA) 200-5 MCG/ACT inhaler Inhale 2 puffs into the lungs 2 times daily 1 Inhaler 11     multivitamin, therapeutic with minerals (MULTI-VITAMIN) TABS tablet Take 1 tablet by mouth every morning  100 tablet 3     order for DME Equipment being ordered: Oxygen with portability. 2 liters per nasal cannula continuous. 1 Device 0     oxyCODONE (ROXICODONE) 5 MG tablet Take 1 tablet (5 mg) by mouth every 6 hours as needed for pain 12 tablet 0     oxyCODONE-acetaminophen (PERCOCET) 5-325 MG tablet Take 1 tablet by mouth every 4 hours as needed for breakthrough pain 2 tablet 0     OXYGEN-HELIUM IN 2 L 2 LPM continuous   [employer]       predniSONE (DELTASONE) 5 MG tablet Take 5 mg by mouth daily       tamsulosin (FLOMAX) 0.4 MG capsule TAKE 1 CAPSULE (0.4 MG) BY MOUTH AT BEDTIME 90 capsule 1     tiotropium (SPIRIVA RESPIMAT) 2.5 MCG/ACT inhaler Inhale 2 puffs into the lungs daily         Physical Exam:  no exam due to  virtual visit    Labs:  reviewed    Imaging studies:  CT chest (March 2021):  - images directly reviewed, formal interpretation follows:  FINDINGS:  ANGIOGRAM CHEST: No evidence for pulmonary embolism. Pulmonary arteries appear mildly dilated. Thoracic aorta normal in caliber. No aortic dissection or other acute abnormality.     HEART: Cardiac chambers within normal limits. No pericardial effusion. Severe coronary artery calcification.     LUNGS AND PLEURA: Severe emphysema.      There are multiple pulmonary nodules:  Stellate and slightly spiculated left upper lobe pulmonary nodule measures 1.2 cm long on sagittal 150, by 1.7 cm AP by 1.3 cm transverse on axial 39.      Superior and posterior to this dominant nodule is a second nodule which measures 9 mm long on sagittal 140, by 11 mm transversely by 9 mm AP on axial image 34.      Within the superior lingula there is a more definitively spiculated nodule measuring 20 mm AP by 9 mm transversely on axial image 71 and 13 mm long on sagittal image 153.      There is an area of pleural thickening superiorly best seen on sagittal image 154 and axial image 59, which is approximately 10 mm diameter by 36 mm in length.      Slightly nodular focus of consolidation is noted in the inferior lingula anteriorly on axial 90.     In the right lower lobe just above the diaphragm there is a spiculated nodule measuring 14 x 7 mm on axial image 145 x 5 mm long on sagittal 72.      No pleural effusion or pneumothorax.     MEDIASTINUM: There are borderline enlarged mediastinal and hilar lymph nodes, most of which, however, show some degree of coarse calcification consistent with prior granulomatous process.     LIMITED UPPER ABDOMEN: Negative.     MUSCULOSKELETAL: Mild vertebral compression deformities are noted at T6 and T10 with sclerosis along the superior endplate. Diffuse osteopenia. No suspicious bone lesion.                                                                       IMPRESSION:  1.  No evidence for pulmonary embolism.   2.  Severe emphysema.  3.  Multiple new pulmonary nodules primarily in the left lung anyone of which could represent a primary lung cancer. Multifocal lung cancer or alternatively an infectious or inflammatory process are also considered. Correlate clinically for infection.   Management options include pulmonary consultation, short-term follow-up imaging after treatment, percutaneous biopsy considered less viable options given severe emphysema.    Pulmonary Function Testing  April 2021:  FEV1 0.62 L  Very severe obstruction  DLCOc 22%    Video visit time: 30 minutes

## 2021-11-08 NOTE — LETTER
"    11/8/2021         RE: Camacho Covington  [address]  [address]  [facility] [address]        Dear Colleague,    Thank you for referring your patient, Camacho Covington, to the [facility]. Please see a copy of my visit note below.    Camacho is a 61 year old who is being evaluated via a billable video visit.      How would you like to obtain your AVS? MyChart  If the video visit is dropped, the invitation should be resent by: Send to e-mail at: [email or web address]  Will anyone else be joining your video visit? Yes Emelyn (dtr)  {If patient encounters technical issues they should call [phone] :[id number]}    Video Start Time: {video visit start/end time for provider to select:360913}    {PROVIDER CHARTING PREFERENCE:281044}    Subjective   Camacho is a 61 year old who presents for the following health issues {ACCOMPANIED BY STATEMENT (Optional):129875}    HPI       Hospital Follow-up Visit:    Hospital/Nursing Home/IP Rehab Facility: {INPT AND SNF DISCHARGE:785743}  Date of Admission: ***  Date of Discharge: ***  Reason(s) for Admission: ***      Was your hospitalization related to COVID-19? {Yes add questions_No:050198}  Problems taking medications regularly:  {NONE DEFAULTED:391948::\"None\"}  Medication changes since discharge: {NONE DEFAULTED:573335::\"None\"}  Problems adhering to non-medication therapy:  {NONE DEFAULTED:943557::\"None\"}    Summary of hospitalization:  {HOSPITAL DISCHARGE SUMMARY INFO:245174::\"[facility] hospital discharge summary reviewed\"}  Diagnostic Tests/Treatments reviewed.  Follow up needed: {NONE DEFAULTED:559713::\"none\"}  Other Healthcare Providers Involved in Patient s Care:         {those currently involved after discharge:292962::\"None\"}  Update since discharge: {IMPROVED DEFAULT:312535::\"improved.\"} {TIP  Include information from family/caregivers, SNF, Care Coordination :083492}      Post Discharge Medication Reconciliation: {ACO Med Rec (Provider):701405}.  Plan " "of care communicated with {Communicate Plan to:928741::\"patient\"}     {Reference  Coding guidelines- Moderate Complexity F2F/Video within 7 - 14 days of discharge 0320697, High Complexity F2F/Video within 7 days 8909442 or [id number] days [id number] :148760}           Review of Systems   {ROS COMP (Optional):199064}      Objective           Vitals:  No vitals were obtained today due to virtual visit.    Physical Exam   {video visit exam brief selected:238525::\"GENERAL: Healthy, alert and no distress\",\"EYES: Eyes grossly normal to inspection.  No discharge or erythema, or obvious scleral/conjunctival abnormalities.\",\"RESP: No audible wheeze, cough, or visible cyanosis.  No visible retractions or increased work of breathing.  \",\"SKIN: Visible skin clear. No significant rash, abnormal pigmentation or lesions.\",\"NEURO: Cranial nerves grossly intact.  Mentation and speech appropriate for age.\",\"PSYCH: Mentation appears normal, affect normal/bright, judgement and insight intact, normal speech and appearance well-groomed.\"}    {Diagnostic Test Results (Optional):223698}    {AMBULATORY ATTESTATION (Optional):140471}        Video-Visit Details    Type of service:  Video Visit    Video End Time:{video visit start/end time for provider to select:208499}    Originating Location (pt. Location): {video visit patient location:796394::\"Home\"}    Distant Location (provider location):  [facility]     Platform used for Video Visit: {Virtual Visit Platforms:928479::\"[employer]\"}    Pulmonary Clinic Follow-up Visit    Assessment/Plan:  60 year old male with a past medical history significant for tobacco dependence in remission, COPD, chronic respiratory failure, HTN, hyperlipidemia, presenting for follow-up.    COPD: Severe oxygen-dependent COPD with frequent exacerbations. Frequent need for prednisone and now on prednisone 5 mg daily in addition to inhaled triple-therapy and thrice-weekly macrolide. Also on morphine for " air hunger and indicates a desire not to undergo invasive life support (DNR/DNI). Currently has very low energy all the time. Some phlegm buildup. Took doxycycline and prednisone burst which helped transiently. Gets short of breath quickly. With activity, SpO2 drops to 80s even on oxygen, rests and recovers. With coughing, oxygen level comes up. Has to force himself to cough. States he feels too weak and has insufficient transportation to pursue pulmonary rehabilitation.    Plan:  - prednisone 16-day taper, then continue 10 mg daily until follow-up with Dr. Zelaya  - continue mometasone-formoterol 200-5 mcg two inhalations BID; rinse/gargle/spit water after use  - continue tiotropium respimat 2.5 mcg two inhalations daily  - continue azithromycin 250 mg MWF  - continue oxygen  - continue morphine 2.5 mg every 4 hrs as needed for air hunger; may need to increase and consider home hospice at some point in the near future  - referral to pulmonary palliative care  - patient indicates a desire not to undergo invasive life support in the event of critical illness (DNR/DNI)  - completed COVID-19 vaccination (Moderna)  - annual influenza vaccination  - up to date on pneumococcal vaccination  - folow up with Dr. Zelaya in 6 weeks  - encouraged him to call any time with questions or concerning symptoms    Josh Covington MD  Pulmonary and Critical Care Medicine  [facility]  Office [phone]  Pager [phone]  (he/him/his)    CCx: COPD    HPI: 60 year old male with a past medical history significant for tobacco dependence in remission, COPD, chronic respiratory failure, HTN, hyperlipidemia, presenting for follow-up. Severe oxygen-dependent COPD with frequent exacerbations. Frequent need for prednisone and now on prednisone 5 mg daily in addition to inhaled triple-therapy and thrice-weekly macrolide. Also on morphine for air hunger and indicates a desire not to undergo invasive life support (DNR/DNI).  Currently has very low energy all the time. Some phlegm buildup. Took doxycycline and prednisone burst which helped transiently. Gets short of breath quickly. With activity, SpO2 drops to 80s even on oxygen, rests and recovers. With coughing, oxygen level comes up. Has to force himself to cough. States he feels too weak and has insufficient transportation to pursue pulmonary rehabilitation.    ROS:  A 12-system review was obtained and was negative with the exception of the symptoms endorsed in the history of present illness.    PMH:  Past Medical History:   Diagnosis Date     Essential hypertension with goal blood pressure less than 140/90 4/6/2018     Hyperlipidemia LDL goal <100 4/6/2018     Hyponatremia 2/17/2019 2/17/2019:His sodium remains low but improved some off the hydrochlorothiazide. He has high urine sodium and urine osmolality so cause may be related to the hydrochlorothiazide but he may have SIADH. PLAN: Decrease fluids and recheck sodium in 2 weeks.  Stay off hydrochlorothiazide.      Mumps      Pulmonary emphysema (H) 4/6/2018    PFT 4/19/2018 results:FVC=59%, FEV1=23%, FEV1/FVC=30%, DLOC=36%. IMPRESSION:Severe Airflow Obstruction  -Emphysematous Type, Reversible Component.  Possible extrathoracic obstruction or poor inspiratory effort PFT 10/16/2019 results:FVC=61%, FEV1=22%, FEV1/FVC=29%, DLOC=unable to do.   The FVC, FEV1 and FEV1/FVC ratio are reduced.  The inspiratory flow rates are reduced.  The FVC is reduced relati     Tobacco abuse 4/6/2018       PSH:  No past surgical history on file.    Allergies:  No Known Allergies    Family HX:  Family History   Problem Relation Age of Onset     Kidney Cancer Mother      Other Cancer Father         lung     Cerebrovascular Disease Father        Social Hx:  Social History     Socioeconomic History     Marital status:      Spouse name: Not on file     Number of children: Not on file     Years of education: Not on file     Highest education  level: Not on file   Occupational History     Not on file   Tobacco Use     Smoking status: Former Smoker     Packs/day: 0.50     Years: 44.00     Pack years: 22.00     Types: Cigarettes     Quit date: 2021     Years since quittin.8     Smokeless tobacco: Never Used     Tobacco comment: started at age 13,  3-4 cigs daily   Substance and Sexual Activity     Alcohol use: No     Drug use: No     Sexual activity: Yes     Partners: Female   Other Topics Concern     Parent/sibling w/ CABG, MI or angioplasty before 65F 55M? Not Asked   Social History Narrative     Not on file     Social Determinants of Health     Financial Resource Strain: Not on file   Food Insecurity: Not on file   Transportation Needs: Not on file   Physical Activity: Not on file   Stress: Not on file   Social Connections: Not on file   Intimate Partner Violence: Not on file   Housing Stability: Not on file       Current Meds:  Current Outpatient Medications   Medication Sig Dispense Refill     albuterol (VENTOLIN HFA) 108 (90 Base) MCG/ACT inhaler INHALE 2 PUFFS INTO THE LUNGS EVERY 4 HOURS AS NEEDED FOR SHORTNESS OF BREATH, DIFFICULTY BREATHING OR WHEEZING. 18 g 11     aspirin 81 MG tablet Take 81 mg by mouth every morning  90 tablet 3     atorvastatin (LIPITOR) 20 MG tablet Take 1 tablet (20 mg) by mouth daily (Patient taking differently: Take 20 mg by mouth every morning ) 90 tablet 3     azithromycin (ZITHROMAX) 250 MG tablet Take 250 mg by mouth Take Mon/Wed/Fri       cyclobenzaprine (FLEXERIL) 10 MG tablet Take 1 tablet (10 mg) by mouth 3 times daily as needed for muscle spasms 30 tablet 5     dutasteride (AVODART) 0.5 MG capsule Take 1 capsule (0.5 mg) by mouth daily 90 capsule 3     guaiFENesin (MUCINEX) 600 MG 12 hr tablet Take 1 tablet (600 mg) by mouth 2 times daily 30 tablet 1     ibuprofen (ADVIL/MOTRIN) 200 MG capsule Take 2 capsules (400 mg) by mouth every 4 hours as needed for fever ; do not use while taking both prednisone and  aspirin. 120 capsule      ipratropium - albuterol 0.5 mg/2.5 mg/3 mL (DUONEB) 0.5-2.5 (3) MG/3ML neb solution Inhale 3 mLs into the lungs       ipratropium-albuterol (COMBIVENT RESPIMAT)  MCG/ACT inhaler INHALE ONE PUFF BY MOUTH FOUR TIMES A DAY DUE FOR PHYSICAL (Patient taking differently: Inhale 1 puff into the lungs 4 times daily INHALE ONE PUFF BY MOUTH FOUR TIMES A DAY DUE FOR PHYSICAL) 4 g 11     lisinopril (ZESTRIL) 20 MG tablet Take 1 tablet (20 mg) by mouth daily (Patient taking differently: Take 20 mg by mouth every morning ) 90 tablet 3     mometasone-formoterol (DULERA) 200-5 MCG/ACT inhaler Inhale 2 puffs into the lungs 2 times daily 1 Inhaler 11     multivitamin, therapeutic with minerals (MULTI-VITAMIN) TABS tablet Take 1 tablet by mouth every morning  100 tablet 3     order for DME Equipment being ordered: Oxygen with portability. 2 liters per nasal cannula continuous. 1 Device 0     oxyCODONE (ROXICODONE) 5 MG tablet Take 1 tablet (5 mg) by mouth every 6 hours as needed for pain 12 tablet 0     oxyCODONE-acetaminophen (PERCOCET) 5-325 MG tablet Take 1 tablet by mouth every 4 hours as needed for breakthrough pain 2 tablet 0     OXYGEN-HELIUM IN 2 L 2 LPM continuous   [employer]       predniSONE (DELTASONE) 5 MG tablet Take 5 mg by mouth daily       tamsulosin (FLOMAX) 0.4 MG capsule TAKE 1 CAPSULE (0.4 MG) BY MOUTH AT BEDTIME 90 capsule 1     tiotropium (SPIRIVA RESPIMAT) 2.5 MCG/ACT inhaler Inhale 2 puffs into the lungs daily         Physical Exam:  no exam due to virtual visit    Labs:  reviewed    Imaging studies:  CT chest (March 2021):  - images directly reviewed, formal interpretation follows:  FINDINGS:  ANGIOGRAM CHEST: No evidence for pulmonary embolism. Pulmonary arteries appear mildly dilated. Thoracic aorta normal in caliber. No aortic dissection or other acute abnormality.     HEART: Cardiac chambers within normal limits. No pericardial effusion. Severe coronary artery  calcification.     LUNGS AND PLEURA: Severe emphysema.      There are multiple pulmonary nodules:  Stellate and slightly spiculated left upper lobe pulmonary nodule measures 1.2 cm long on sagittal 150, by 1.7 cm AP by 1.3 cm transverse on axial 39.      Superior and posterior to this dominant nodule is a second nodule which measures 9 mm long on sagittal 140, by 11 mm transversely by 9 mm AP on axial image 34.      Within the superior lingula there is a more definitively spiculated nodule measuring 20 mm AP by 9 mm transversely on axial image 71 and 13 mm long on sagittal image 153.      There is an area of pleural thickening superiorly best seen on sagittal image 154 and axial image 59, which is approximately 10 mm diameter by 36 mm in length.      Slightly nodular focus of consolidation is noted in the inferior lingula anteriorly on axial 90.     In the right lower lobe just above the diaphragm there is a spiculated nodule measuring 14 x 7 mm on axial image 145 x 5 mm long on sagittal 72.      No pleural effusion or pneumothorax.     MEDIASTINUM: There are borderline enlarged mediastinal and hilar lymph nodes, most of which, however, show some degree of coarse calcification consistent with prior granulomatous process.     LIMITED UPPER ABDOMEN: Negative.     MUSCULOSKELETAL: Mild vertebral compression deformities are noted at T6 and T10 with sclerosis along the superior endplate. Diffuse osteopenia. No suspicious bone lesion.                                                                      IMPRESSION:  1.  No evidence for pulmonary embolism.   2.  Severe emphysema.  3.  Multiple new pulmonary nodules primarily in the left lung anyone of which could represent a primary lung cancer. Multifocal lung cancer or alternatively an infectious or inflammatory process are also considered. Correlate clinically for infection.   Management options include pulmonary consultation, short-term follow-up imaging after  treatment, percutaneous biopsy considered less viable options given severe emphysema.    Pulmonary Function Testing  April 2021:  FEV1 0.62 L  Very severe obstruction  DLCOc 22%      Again, thank you for allowing me to participate in the care of your patient.        Sincerely,        Josh Covington MD

## 2021-11-08 NOTE — PROGRESS NOTES
Camacho is a 61 year old who is being evaluated via a billable video visit.      How would you like to obtain your AVS? MyChart  If the video visit is dropped, the invitation should be resent by: Send to e-mail at: [email or web address]  Will anyone else be joining your video visit? Yes Emelyn hernandez)

## 2021-11-08 NOTE — LETTER
11/8/2021         RE: Camacho Covington  [address]  [address]  [facility] [address]        Dear Colleague,    Thank you for referring your patient, Camacho Covington, to the [facility]. Please see a copy of my visit note below.    Camacho is a 61 year old who is being evaluated via a billable video visit.      How would you like to obtain your AVS? MyChart  If the video visit is dropped, the invitation should be resent by: Send to e-mail at: [email or web address]  Will anyone else be joining your video visit? Yes Emelyn (dtr)        Pulmonary Clinic Follow-up Visit    Assessment/Plan:  60 year old male with a past medical history significant for tobacco dependence in remission, COPD, chronic respiratory failure, HTN, hyperlipidemia, presenting for follow-up.    COPD: Severe oxygen-dependent COPD with frequent exacerbations. Frequent need for prednisone and now on prednisone 5 mg daily in addition to inhaled triple-therapy and thrice-weekly macrolide. Also on morphine for air hunger and indicates a desire not to undergo invasive life support (DNR/DNI). Currently has very low energy all the time. Some phlegm buildup. Took doxycycline and prednisone burst which helped transiently. Gets short of breath quickly. With activity, SpO2 drops to 80s even on oxygen, rests and recovers. With coughing, oxygen level comes up. Has to force himself to cough. States he feels too weak and has insufficient transportation to pursue pulmonary rehabilitation.    Plan:  - prednisone 16-day taper, then continue 10 mg daily until follow-up with Dr. Zelaya  - continue mometasone-formoterol 200-5 mcg two inhalations BID; rinse/gargle/spit water after use  - continue tiotropium respimat 2.5 mcg two inhalations daily  - continue azithromycin 250 mg MWF  - continue oxygen  - continue morphine 2.5 mg every 4 hrs as needed for air hunger; may need to increase and consider home hospice at some point in the near future  - referral to  pulmonary palliative care  - patient indicates a desire not to undergo invasive life support in the event of critical illness (DNR/DNI)  - completed COVID-19 vaccination (Moderna)  - annual influenza vaccination  - up to date on pneumococcal vaccination  - folow up with Dr. Zelaya in 6 weeks  - encouraged him to call any time with questions or concerning symptoms    Josh Covington MD  Pulmonary and Critical Care Medicine  [facility]  Office [phone]  Pager [phone]  (he/him/his)    CCx: COPD    HPI: 60 year old male with a past medical history significant for tobacco dependence in remission, COPD, chronic respiratory failure, HTN, hyperlipidemia, presenting for follow-up. Severe oxygen-dependent COPD with frequent exacerbations. Frequent need for prednisone and now on prednisone 5 mg daily in addition to inhaled triple-therapy and thrice-weekly macrolide. Also on morphine for air hunger and indicates a desire not to undergo invasive life support (DNR/DNI). Currently has very low energy all the time. Some phlegm buildup. Took doxycycline and prednisone burst which helped transiently. Gets short of breath quickly. With activity, SpO2 drops to 80s even on oxygen, rests and recovers. With coughing, oxygen level comes up. Has to force himself to cough. States he feels too weak and has insufficient transportation to pursue pulmonary rehabilitation.    ROS:  A 12-system review was obtained and was negative with the exception of the symptoms endorsed in the history of present illness.    PMH:  Past Medical History:   Diagnosis Date     Essential hypertension with goal blood pressure less than 140/90 4/6/2018     Hyperlipidemia LDL goal <100 4/6/2018     Hyponatremia 2/17/2019 2/17/2019:His sodium remains low but improved some off the hydrochlorothiazide. He has high urine sodium and urine osmolality so cause may be related to the hydrochlorothiazide but he may have SIADH. PLAN: Decrease fluids  and recheck sodium in 2 weeks.  Stay off hydrochlorothiazide.      Mumps      Pulmonary emphysema (H) 2018    PFT 2018 results:FVC=59%, FEV1=23%, FEV1/FVC=30%, DLOC=36%. IMPRESSION:Severe Airflow Obstruction  -Emphysematous Type, Reversible Component.  Possible extrathoracic obstruction or poor inspiratory effort PFT 10/16/2019 results:FVC=61%, FEV1=22%, FEV1/FVC=29%, DLOC=unable to do.   The FVC, FEV1 and FEV1/FVC ratio are reduced.  The inspiratory flow rates are reduced.  The FVC is reduced relati     Tobacco abuse 2018       PSH:  No past surgical history on file.    Allergies:  No Known Allergies    Family HX:  Family History   Problem Relation Age of Onset     Kidney Cancer Mother      Other Cancer Father         lung     Cerebrovascular Disease Father        Social Hx:  Social History     Socioeconomic History     Marital status:      Spouse name: Not on file     Number of children: Not on file     Years of education: Not on file     Highest education level: Not on file   Occupational History     Not on file   Tobacco Use     Smoking status: Former Smoker     Packs/day: 0.50     Years: 44.00     Pack years: 22.00     Types: Cigarettes     Quit date: 2021     Years since quittin.8     Smokeless tobacco: Never Used     Tobacco comment: started at age 13,  3-4 cigs daily   Substance and Sexual Activity     Alcohol use: No     Drug use: No     Sexual activity: Yes     Partners: Female   Other Topics Concern     Parent/sibling w/ CABG, MI or angioplasty before 65F 55M? Not Asked   Social History Narrative     Not on file     Social Determinants of Health     Financial Resource Strain: Not on file   Food Insecurity: Not on file   Transportation Needs: Not on file   Physical Activity: Not on file   Stress: Not on file   Social Connections: Not on file   Intimate Partner Violence: Not on file   Housing Stability: Not on file       Current Meds:  Current Outpatient Medications   Medication  Sig Dispense Refill     albuterol (VENTOLIN HFA) 108 (90 Base) MCG/ACT inhaler INHALE 2 PUFFS INTO THE LUNGS EVERY 4 HOURS AS NEEDED FOR SHORTNESS OF BREATH, DIFFICULTY BREATHING OR WHEEZING. 18 g 11     aspirin 81 MG tablet Take 81 mg by mouth every morning  90 tablet 3     atorvastatin (LIPITOR) 20 MG tablet Take 1 tablet (20 mg) by mouth daily (Patient taking differently: Take 20 mg by mouth every morning ) 90 tablet 3     azithromycin (ZITHROMAX) 250 MG tablet Take 250 mg by mouth Take Mon/Wed/Fri       cyclobenzaprine (FLEXERIL) 10 MG tablet Take 1 tablet (10 mg) by mouth 3 times daily as needed for muscle spasms 30 tablet 5     dutasteride (AVODART) 0.5 MG capsule Take 1 capsule (0.5 mg) by mouth daily 90 capsule 3     guaiFENesin (MUCINEX) 600 MG 12 hr tablet Take 1 tablet (600 mg) by mouth 2 times daily 30 tablet 1     ibuprofen (ADVIL/MOTRIN) 200 MG capsule Take 2 capsules (400 mg) by mouth every 4 hours as needed for fever ; do not use while taking both prednisone and aspirin. 120 capsule      ipratropium - albuterol 0.5 mg/2.5 mg/3 mL (DUONEB) 0.5-2.5 (3) MG/3ML neb solution Inhale 3 mLs into the lungs       ipratropium-albuterol (COMBIVENT RESPIMAT)  MCG/ACT inhaler INHALE ONE PUFF BY MOUTH FOUR TIMES A DAY DUE FOR PHYSICAL (Patient taking differently: Inhale 1 puff into the lungs 4 times daily INHALE ONE PUFF BY MOUTH FOUR TIMES A DAY DUE FOR PHYSICAL) 4 g 11     lisinopril (ZESTRIL) 20 MG tablet Take 1 tablet (20 mg) by mouth daily (Patient taking differently: Take 20 mg by mouth every morning ) 90 tablet 3     mometasone-formoterol (DULERA) 200-5 MCG/ACT inhaler Inhale 2 puffs into the lungs 2 times daily 1 Inhaler 11     multivitamin, therapeutic with minerals (MULTI-VITAMIN) TABS tablet Take 1 tablet by mouth every morning  100 tablet 3     order for DME Equipment being ordered: Oxygen with portability. 2 liters per nasal cannula continuous. 1 Device 0     oxyCODONE (ROXICODONE) 5 MG tablet  Take 1 tablet (5 mg) by mouth every 6 hours as needed for pain 12 tablet 0     oxyCODONE-acetaminophen (PERCOCET) 5-325 MG tablet Take 1 tablet by mouth every 4 hours as needed for breakthrough pain 2 tablet 0     OXYGEN-HELIUM IN 2 L 2 LPM continuous   [employer]       predniSONE (DELTASONE) 5 MG tablet Take 5 mg by mouth daily       tamsulosin (FLOMAX) 0.4 MG capsule TAKE 1 CAPSULE (0.4 MG) BY MOUTH AT BEDTIME 90 capsule 1     tiotropium (SPIRIVA RESPIMAT) 2.5 MCG/ACT inhaler Inhale 2 puffs into the lungs daily         Physical Exam:  no exam due to virtual visit    Labs:  reviewed    Imaging studies:  CT chest (March 2021):  - images directly reviewed, formal interpretation follows:  FINDINGS:  ANGIOGRAM CHEST: No evidence for pulmonary embolism. Pulmonary arteries appear mildly dilated. Thoracic aorta normal in caliber. No aortic dissection or other acute abnormality.     HEART: Cardiac chambers within normal limits. No pericardial effusion. Severe coronary artery calcification.     LUNGS AND PLEURA: Severe emphysema.      There are multiple pulmonary nodules:  Stellate and slightly spiculated left upper lobe pulmonary nodule measures 1.2 cm long on sagittal 150, by 1.7 cm AP by 1.3 cm transverse on axial 39.      Superior and posterior to this dominant nodule is a second nodule which measures 9 mm long on sagittal 140, by 11 mm transversely by 9 mm AP on axial image 34.      Within the superior lingula there is a more definitively spiculated nodule measuring 20 mm AP by 9 mm transversely on axial image 71 and 13 mm long on sagittal image 153.      There is an area of pleural thickening superiorly best seen on sagittal image 154 and axial image 59, which is approximately 10 mm diameter by 36 mm in length.      Slightly nodular focus of consolidation is noted in the inferior lingula anteriorly on axial 90.     In the right lower lobe just above the diaphragm there is a spiculated nodule measuring 14 x 7 mm on axial  image 145 x 5 mm long on sagittal 72.      No pleural effusion or pneumothorax.     MEDIASTINUM: There are borderline enlarged mediastinal and hilar lymph nodes, most of which, however, show some degree of coarse calcification consistent with prior granulomatous process.     LIMITED UPPER ABDOMEN: Negative.     MUSCULOSKELETAL: Mild vertebral compression deformities are noted at T6 and T10 with sclerosis along the superior endplate. Diffuse osteopenia. No suspicious bone lesion.                                                                      IMPRESSION:  1.  No evidence for pulmonary embolism.   2.  Severe emphysema.  3.  Multiple new pulmonary nodules primarily in the left lung anyone of which could represent a primary lung cancer. Multifocal lung cancer or alternatively an infectious or inflammatory process are also considered. Correlate clinically for infection.   Management options include pulmonary consultation, short-term follow-up imaging after treatment, percutaneous biopsy considered less viable options given severe emphysema.    Pulmonary Function Testing  April 2021:  FEV1 0.62 L  Very severe obstruction  DLCOc 22%    Video visit time: 30 minutes      Again, thank you for allowing me to participate in the care of your patient.        Sincerely,        Josh Covington MD

## 2021-11-09 NOTE — PATIENT INSTRUCTIONS
It was good to meet you today by video. This is what we discussed:    Take the prednisone taper as prescribed, then take prednisone 10 mg daily.  Continue your current medications.  Use morphine as needed for air hunger.  Continue the oxygen.  We will get you in to see pulmonary palliative care and to see Dr. Zelaya in approximately 6-8 weeks.  Please call any time with questions or concerning symptoms.    Josh Covington MD  Pulmonary and Critical Care Medicine  [facility]  Office [phone]

## 2021-11-16 NOTE — TELEPHONE ENCOUNTER
Zhen Leyva is on back order so Advair Diskus 500-50 was ordered in its place but his ins. Does not cover Advair. Do you want to do a prior auth?

## 2021-11-16 NOTE — TELEPHONE ENCOUNTER
Prior Authorization Retail Medication Request    Medication/Dose: Advair 500-50  ICD code (if different than what is on RX):  Pulmonary emphysema, unspecified emphysema type (H) [J43.9]   Previously Tried and Failed:  Pt was stable on Dulera but it is on back order--per pharmacy.  Requesting a prior auth approval for the advair until the Dulera becomes available.   Rationale:       Insurance Name:  Not provided  Insurance ID:         Pharmacy Information (if different than what is on RX)  Name:  [facility]  Phone:

## 2021-11-16 NOTE — TELEPHONE ENCOUNTER
PA Initiation    Medication: fluticasone-salmeterol (ADVAIR) 500-50 MCG/DOSE inhaler- INITIATED  Insurance Company: [employer] - Phone [phone] Fax [phone]  Pharmacy Filling the Rx: [place], [place] - [address] [place] [address]  Filling Pharmacy Phone: [phone]  Filling Pharmacy Fax: [phone]  Start Date: 11/16/2021

## 2021-11-17 NOTE — TELEPHONE ENCOUNTER
PRIOR AUTHORIZATION DENIED    Medication: fluticasone-salmeterol (ADVAIR) 500-50 MCG/DOSE inhaler- DENIED    Denial Date: 11/17/2021    Denial Rational: NON-FORMULARY          Appeal Information:           Central Prior Authorization Team  Phone: [phone]

## 2021-11-30 NOTE — PROGRESS NOTES
RECORDS STATUS - ALL OTHER DIAGNOSIS      RECORDS RECEIVED FROM: [facility]   DATE RECEIVED: 12/10/2021   NOTES STATUS DETAILS   OFFICE NOTE from referring provider     OFFICE NOTE from medical oncologist N/A    DISCHARGE SUMMARY from hospital [facility] -3/25/2021- Acute on chronic respiratory failure (H)     DISCHARGE REPORT from the ER [facility]- 3/25/2021- Acute on chronic respiratory failure (H)   OPERATIVE REPORT     MEDICATION LIST [facility]   CLINICAL TRIAL TREATMENTS TO DATE     LABS     PATHOLOGY REPORTS     ANYTHING RELATED TO DIAGNOSIS Complete Labs last updated on 10/27/2021   GENONOMIC TESTING     TYPE:     IMAGING (NEED IMAGES & REPORT)     CT SCANS Complete CT Abdomen Pelvis 10/27/2021   MRI     MAMMO     ULTRASOUND     PET

## 2021-12-05 NOTE — ED TRIAGE NOTES
EMS Note  From home after 2-3 days of increasing SOB. Found by EMS on 3 lpm with sats 85%. Received 0.3 mg IM epi, 2 G Magnesium IV, 1 duo neb, 3 albuterol nebs, 125 mg Solumedrol IV. Refused BiPap/Cpap.    Camacho's Note  2-3 days of SOB. Lungs slight wheeze uppers, diminished lowers/silent. Using abdomen muscles, and intercostal muscles. No JVD or trachea tugging.     Feeling better after the EMS treatment but moving shallow air.

## 2021-12-05 NOTE — ED PROVIDER NOTES
History     Chief Complaint   Patient presents with     Shortness of Breath     HPI  Camacho Covington is a 61 year old male who has past medical history significant for chronic respiratory failure with 2 to 3 L nasal cannula oxygen at baseline at home, with significant debilitation from COPD with limited exercise tolerance, history of tobacco use, hypertension, hyperlipidemia, presenting to the emergency department via EMS for concerns regarding severe shortness of breath.  Patient arrives in tripod position, severely short of breath with poor air entry.  Did receive multiple medications by EMS prior to arrival including Solu-Medrol, magnesium, epinephrine intramuscular injection, in addition to duo nebulizer and albuterol nebulizers.  Patient was then started on nonrebreather oxygen.  He had refused BiPAP/CPAP.    Patient reports approximately 2 to 3-day history of increased amounts of shortness of breath.  No severe worsening of cough.  Denies any severe pains.  Primarily concerned about shortness of breath.  Oxygen saturations for EMS were 85% on his 3 L nasal cannula oxygen.    Patient has been vaccinated for Covid.    Patient did have similar type of presentation in January, 2021.  Had presented with sepsis with pneumonia at that time.  Also had hyponatremia of uncertain etiology.    Allergies:  No Known Allergies    Problem List:    Patient Active Problem List    Diagnosis Date Noted     COPD exacerbation (H) 03/25/2021     Priority: Medium     Acute on chronic respiratory failure (H) 01/05/2021     Priority: Medium     Pulmonary nodules 11/20/2020     Priority: Medium     Chest CT 3/25/2021:IMPRESSION:  1.  No evidence for pulmonary embolism.   2.  Severe emphysema.  3.  Multiple new pulmonary nodules primarily in the left lung anyone of which could represent a primary lung cancer. Multifocal lung cancer or alternatively an infectious or inflammatory process are also considered. Correlate clinically for  infection.   Management options include pulmonary consultation, short-term follow-up imaging after treatment, percutaneous biopsy considered less viable options given severe emphysema.       Skin lesion 09/27/2019     Priority: Medium     Hyponatremia 02/17/2019     Priority: Medium     2/17/2019:His sodium remains low but improved some off the hydrochlorothiazide.  He has high urine sodium and urine osmolality so cause may be related to the hydrochlorothiazide but he may have SIADH.  PLAN: Decrease fluids and recheck sodium in 2 weeks.  Stay off hydrochlorothiazide.        Pulmonary emphysema (H) 04/06/2018     Priority: Medium     PFT 4/19/2018 results:FVC=59%, FEV1=23%, FEV1/FVC=30%, DLOC=36%.  IMPRESSION:Severe Airflow Obstruction  -Emphysematous Type, Reversible Component.  Possible extrathoracic obstruction or poor inspiratory effort  PFT 10/16/2019 results:FVC=61%, FEV1=22%, FEV1/FVC=29%, DLOC=unable to do.    The FVC, FEV1 and FEV1/FVC ratio are reduced.  The inspiratory flow rates are reduced.  The FVC is reduced relative to the SVC indicating air trapping.  The TLC, RV, FRC and RV/TLC ratio are all increased indicating overinflation and air trapping.  Following administration of bronchodilators, there is a significant response indicated by the increased FVC.  Severe airway obstruction and overinflation are present.  A clinical trial of bronchodilators may be beneficial in view of the airway obstruction.  IMPRESSION:Severe Airflow Obstruction.   Unable to complete DLCO.  Possible extrathoraacic obstruction.          Essential hypertension with goal blood pressure less than 140/90 04/06/2018     Priority: Medium     Hyperlipidemia LDL goal <100 04/06/2018     Priority: Medium     Tobacco abuse 04/06/2018     Priority: Medium        Past Medical History:    Past Medical History:   Diagnosis Date     Essential hypertension with goal blood pressure less than 140/90 4/6/2018     Hyperlipidemia LDL goal <100  2018     Hyponatremia 2019     Mumps      Pulmonary emphysema (H) 2018     Tobacco abuse 2018       Past Surgical History:    No past surgical history on file.    Family History:    Family History   Problem Relation Age of Onset     Kidney Cancer Mother      Other Cancer Father         lung     Cerebrovascular Disease Father        Social History:  Marital Status:   [2]  Social History     Tobacco Use     Smoking status: Former Smoker     Packs/day: 0.50     Years: 44.00     Pack years: 22.00     Types: Cigarettes     Quit date: 2021     Years since quittin.9     Smokeless tobacco: Never Used     Tobacco comment: started at age 13,  3-4 cigs daily   Substance Use Topics     Alcohol use: No     Drug use: No        Medications:    albuterol (VENTOLIN HFA) 108 (90 Base) MCG/ACT inhaler  aspirin 81 MG tablet  atorvastatin (LIPITOR) 20 MG tablet  azithromycin (ZITHROMAX) 250 MG tablet  budesonide-formoterol (SYMBICORT) 160-4.5 MCG/ACT Inhaler  cyclobenzaprine (FLEXERIL) 10 MG tablet  dutasteride (AVODART) 0.5 MG capsule  guaiFENesin (MUCINEX) 600 MG 12 hr tablet  ibuprofen (ADVIL/MOTRIN) 200 MG capsule  ipratropium - albuterol 0.5 mg/2.5 mg/3 mL (DUONEB) 0.5-2.5 (3) MG/3ML neb solution  ipratropium-albuterol (COMBIVENT RESPIMAT)  MCG/ACT inhaler  lisinopril (ZESTRIL) 20 MG tablet  multivitamin, therapeutic with minerals (MULTI-VITAMIN) TABS tablet  order for DME  oxyCODONE-acetaminophen (PERCOCET) 5-325 MG tablet  OXYGEN-HELIUM IN  predniSONE (DELTASONE) 10 MG tablet  predniSONE (DELTASONE) 10 MG tablet  predniSONE (DELTASONE) 5 MG tablet  tamsulosin (FLOMAX) 0.4 MG capsule  tiotropium (SPIRIVA RESPIMAT) 2.5 MCG/ACT inhaler          Review of Systems   Constitutional: Negative for fever.   Respiratory: Positive for shortness of breath and wheezing.    Cardiovascular: Negative for chest pain.   Gastrointestinal: Negative for abdominal pain.   All other systems reviewed and are  "negative.      Physical Exam   BP: (!) 159/81  Pulse: 115  Temp: 98.4  F (36.9  C)  Resp: (!) 50  Height: 170.2 cm (5' 7\")  Weight: 63.5 kg (140 lb)  SpO2: 99 %      Physical Exam  BP (!) 143/97   Pulse 100   Temp 98.4  F (36.9  C) (Axillary)   Resp 26   Ht 1.702 m (5' 7\")   Wt 63.5 kg (140 lb)   SpO2 95%   BMI 21.93 kg/m    General: alert, awake, in severe respiratory distress with tripod respirations.  Head: atraumatic  Nose: no rhinorrhea or epistaxis  Ears: no external auditory canal discharge or bleeding.    Eyes: Sclera nonicteric. Conjunctiva noninjected.    Neck: supple, no palp LAD  Lungs: Diffuse mild end expiratory wheezing, however very poor air entry  CV: RRR 2 tachycardic, S1/S2; peripheral pulses palpable and symmetric  Abdomen: soft, nt, nd, no guarding or rebound. Positive bowel sounds  Extremities: no cyanosis or edema  Skin: no rash or diaphoresis  Neuro: strength 5/5 in UE and LEs bilaterally, sensation intact to light touch in UE and LEs bilaterally;       ED Course                 Procedures                           Results for orders placed or performed during the hospital encounter of 12/05/21 (from the past 24 hour(s))   CBC with platelets, differential    Narrative    The following orders were created for panel order CBC with platelets, differential.  Procedure                               Abnormality         Status                     ---------                               -----------         ------                     CBC with platelets and d...[[id number]]  Abnormal            Final result                 Please view results for these tests on the individual orders.   Basic metabolic panel   Result Value Ref Range    Sodium 121 (L) 133 - 144 mmol/L    Potassium 4.6 3.4 - 5.3 mmol/L    Chloride 80 (L) 94 - 109 mmol/L    Carbon Dioxide (CO2) 33 (H) 20 - 32 mmol/L    Anion Gap 8 3 - 14 mmol/L    Urea Nitrogen 12 7 - 30 mg/dL    Creatinine 0.33 (L) 0.66 - 1.25 mg/dL    Calcium 9.4 " 8.5 - 10.1 mg/dL    Glucose 152 (H) 70 - 99 mg/dL    GFR Estimate >90 >60 mL/min/1.73m2   Troponin I   Result Value Ref Range    Troponin I High Sensitivity 14 <79 ng/L   Blood gas venous   Result Value Ref Range    pH Venous 7.23 (L) 7.32 - 7.43    pCO2 Venous 91 (HH) 40 - 50 mm Hg    pO2 Venous 78 (H) 25 - 47 mm Hg    Bicarbonate Venous 38 (H) 21 - 28 mmol/L    Base Excess/Deficit (+/-) 7.5 (H) -7.7 - 1.9 mmol/L    FIO2 70    CBC with platelets and differential   Result Value Ref Range    WBC Count 12.1 (H) 4.0 - 11.0 10e3/uL    RBC Count 3.61 (L) 4.40 - 5.90 10e6/uL    Hemoglobin 10.8 (L) 13.3 - 17.7 g/dL    Hematocrit 33.4 (L) 40.0 - 53.0 %    MCV 93 78 - 100 fL    MCH 29.9 26.5 - 33.0 pg    MCHC 32.3 31.5 - 36.5 g/dL    RDW 12.2 10.0 - 15.0 %    Platelet Count 403 150 - 450 10e3/uL    % Neutrophils 75 %    % Lymphocytes 14 %    % Monocytes 10 %    % Eosinophils 0 %    % Basophils 0 %    % Immature Granulocytes 1 %    NRBCs per 100 WBC 0 <1 /100    Absolute Neutrophils 9.1 (H) 1.6 - 8.3 10e3/uL    Absolute Lymphocytes 1.7 0.8 - 5.3 10e3/uL    Absolute Monocytes 1.2 0.0 - 1.3 10e3/uL    Absolute Eosinophils 0.0 0.0 - 0.7 10e3/uL    Absolute Basophils 0.0 0.0 - 0.2 10e3/uL    Absolute Immature Granulocytes 0.1 <=0.4 10e3/uL    Absolute NRBCs 0.0 10e3/uL   Symptomatic Influenza A/B & SARS-CoV2 (COVID-19) Virus PCR Multiplex Nasopharyngeal    Specimen: Nasopharyngeal; Swab   Result Value Ref Range    Influenza A PCR Negative Negative    Influenza B PCR Negative Negative    SARS CoV2 PCR Negative Negative    Narrative    Testing was performed using the cristofer SARS-CoV-2 & Influenza A/B Assay on the cristofer Kimmy System. This test should be ordered for the detection of SARS-CoV-2 and influenza viruses in individuals who meet clinical and/or epidemiological criteria. Test performance is unknown in asymptomatic patients. This test is for in vitro diagnostic use under the FDA EUA for laboratories certified under CLIA to  perform moderate and/or high complexity testing. This test has not been FDA cleared or approved. A negative result does not rule out the presence of PCR inhibitors in the specimen or target RNA in concentration below the limit of detection for the assay. If only one viral target is positive but coinfection with multiple targets is suspected, the sample should be re-tested with another FDA cleared, approved or authorized test, if coinfection would change clinical management. [facility] are certified under the Clinical Laboratory Improvement Amendments of 1988 (CLIA-88) as  qualified to perform moderate and/or high complexity laboratory testing.   XR Chest Port 1 View    Narrative    EXAM: XR CHEST PORT 1 VIEW  LOCATION: [facility]  DATE/TIME: 12/5/2021 1:05 AM    INDICATION: Shortness of breath  COMPARISON: None.      Impression    IMPRESSION: Heart size is normal. Heavy atherosclerotic calcifications of aorta. Lungs hyperinflated consistent with COPD. Slight diffuse interstitial prominence likely relates to chronic fibrosis. No focal pneumonia.   Blood gas venous   Result Value Ref Range    pH Venous 7.26 (L) 7.32 - 7.43    pCO2 Venous 86 (HH) 40 - 50 mm Hg    pO2 Venous 77 (H) 25 - 47 mm Hg    Bicarbonate Venous 38 (H) 21 - 28 mmol/L    Base Excess/Deficit (+/-) 8.8 (H) -7.7 - 1.9 mmol/L    FIO2 70        Medications   albuterol (PROVENTIL) neb solution 2.5 mg (2.5 mg Nebulization Not Given 12/5/21 0301)   atorvastatin (LIPITOR) tablet 20 mg (has no administration in time range)   lisinopril (ZESTRIL) tablet 20 mg (has no administration in time range)   aspirin (ASA) chewable tablet 81 mg (has no administration in time range)       Assessments & Plan (with Medical Decision Making)  61 year old male who has past medical history significant for chronic respiratory failure on 2 to 3 L nasal cannula oxygen at home, presenting to the emergency department with concerns  regarding increased amounts of shortness of breath over the past 2 to 3 days.  Patient with no fevers at home.  EMS was called secondary to patient with increased amounts of shortness of breath.  They arrived and noted his oxygen saturations to be 85% on his home 3 L nasal cannula oxygen.  At baseline, patient has very minimal activity level as he does become severely short of breath, and is unable to tolerate much for movement.  Patient with poor air entry, and paramedics had administered nebulizers, Solu-Medrol, magnesium, in addition to intramuscular epinephrine prior to arrival.  Patient had refused BiPAP/CPAP for EMS.    Shortly after arrival, I did have discussion with patient regarding need for BiPAP administration.  Patient agreed to BiPAP.  He was severely short of breath, breathing 50 breaths/min in tripod type respiration.  Poor air entry.  Ultimately Covid test returned which was negative.  Albuterol nebulizers administered.  Patient did already receive steroids.  Will continue steroids, nebulizers, and BiPAP.  Likely COPD exacerbation.  Patient will require hospitalization.  Does have some hyponatremia, which has been present previously.  Uncertain exact etiology.    No beds available at this point, and therefore patient is pending ICU admission on BiPAP.     I have reviewed the nursing notes.    I have reviewed the findings, diagnosis, plan and need for follow up with the patient.      Critical Care Addendum    My initial assessment, based on my review of prehospital provider report, review of nursing observations, review of vital signs, focused history and physical exam, established that Camacho Covington has respiratory insufficiency, which requires immediate intervention, and therefore He is critically ill.     After the initial assessment, the care team initiated multiple lab tests, initiated IV fluid administration and initiated intensive non-invasive respiratory support to provide stabilization  care. Due to the critical nature of this patient, I reassessed nursing observations, vital signs, physical exam and respiratory status multiple times prior to He disposition.     Time also spent performing documentation, reviewing test results and coordination of care.     Critical care time (excluding teaching time and procedures): 95 minutes.       New Prescriptions    No medications on file       Final diagnoses:   Acute on chronic respiratory failure with hypoxia and hypercapnia (H)   Pulmonary emphysema, unspecified emphysema type (H)   Tobacco abuse   Essential hypertension with goal blood pressure less than 140/90       12/5/2021   [facility] EMERGENCY DEPT     Efrain Stephens MD  12/05/21 0505       Efrain Stephens MD  12/05/21 0573

## 2021-12-05 NOTE — H&P
[facility]    History and Physical - Hospitalist Service       Date of Admission:  12/5/2021    Assessment & Plan   Camacho Covington is a 61 year old male admitted on 12/5/2021. He has end-stage COPD on chronic oxygen 2-3 L/min nasal cannula, he presented with increased shortness of breath and was admitted with a COPD exacerbation and acute hypoxic respiratory failure.    COPD exacerbation  Acute on chronic hypoxic and hypercapnic respiratory failure  The patient is chronically on 2-3 L oxygen via nasal cannula.  PFTs are consistent with very severe obstructive lung disease without reversibility.  FEV1 to FVC ratio was 28%, FEV1 is (0.62L) 19%, and FVC is (2.17L) 62% of predicted.  He is steroid-dependent and chronically on prednisone 5 mg daily.  In the ED, initial VBG 7.23 / 91 / 78/ 38.  After BiPAP, improved to 7.31 / 78 / 56 / 39  The patient is admitted to the intensive care unit due to need for BiPAP and need for close monitoring due to high risk for respiratory decompensation.  Will attempt to keep oxygen saturations between 90 to 94% to avoid carbon dioxide retention.  The patient was treated with azithromycin and ceftriaxone emergency department, but there is no infiltrate on chest x-ray.  Will check procalcitonin and consider discontinuing ceftriaxone and resuming prior to admission thrice weekly azithromycin.  We will continue prior to admission Symbicort and Spiriva.  Will order albuterol nebs every 4 hours while awake.    Hyponatremia  Serum sodium was 121 on presentation to the emergency room.  Sodium decreased to 119 this morning.  Will hold saline infusion and check urine sodium and urine osmolality to determine etiology of hyponatremia.  Continue every 6 hour sodium checks.    Hypertension  Continue prior to admission lisinopril.    Hyperlipidemia  Continue prior to admission atorvastatin.     Pulmonary nodules  Chest CT on 3/25/2021 demonstrates multiple new pulmonary  nodules primarily in the left lung concerning for multifocal lung cancer.  The patient is felt to be too high risk for the multiple biopsies needed to establish a diagnosis and rule out malignancy.    Benign prostatic hypertrophy  Continue prior to admission dutasteride and tamsulosin.    Clinically Significant Risk Factors Present on Admission       # Platelet Defect: home medication list includes an antiplatelet medication         Diet: Regular Diet Adult    DVT Prophylaxis: Enoxaparin (Lovenox) SQ  Smith Catheter: Not present  Code Status:  DNR/DNI, confirmed with patient on admission.    Disposition Plan   Patient will require 2 to 3 days for resolution of acute hypoxia.    Entered: Earl Quiroz MD 12/05/2021, 9:07 AM       Earl Quiroz MD  [facility]    ______________________________________________________________________    Chief Complaint   Chief Complaint   Patient presents with     Shortness of Breath        History is obtained from the patient    History of Present Illness   Camacho Covington is a 61 year old male who presents with shortness of breath.    The patient presented emergency department with 3 days of increasing shortness of breath.  He denies fevers or chills.  He denies increased cough or change in sputum production.  The patient is end-stage COPD and is chronically oxygen dependent on 2 to 3 L nasal cannula at home.  He denies any recent illnesses including nasal congestion, runny nose, or sore throat.  He denies nausea, vomiting, or diarrhea.  He denies sick contacts.  Patient called EMS yesterday evening and was found to be 85% on his home 3 L nasal cannula.  EMS treated the patient with nebulizers, Solu-Medrol, magnesium, and IM epinephrine prior to arrival.  On arrival in the emergency department, patient was noted to be breathing at a rate of 50 breaths/min and tripoding.  He initially declined BiPAP for EMS, but patient was agreeable after  discussion in the emergency department.  Patient denies chest pain, chest pressure, palpitations, lightheadedness, or dizziness.  Patient denies syncope, falls, or trauma.  Patient denies melena, hematochezia, constipation, or abdominal pain.  Patient denies dysuria or hematuria.  Patient denies rash, muscle aches, joint pain, or edema.  Patient denies headache, neck pain, or back pain.  Patient denies diplopia, dysarthria, dysphagia, incoordination, focal numbness, or unilateral weakness.    Review of Systems    The 10 point Review of Systems is negative other than noted in the HPI or here.     Past Medical History    I have reviewed this patient's medical history and updated it with pertinent information if needed.   Past Medical History:   Diagnosis Date     Essential hypertension with goal blood pressure less than 140/90 4/6/2018     Hyperlipidemia LDL goal <100 4/6/2018     Hyponatremia 2/17/2019 2/17/2019:His sodium remains low but improved some off the hydrochlorothiazide. He has high urine sodium and urine osmolality so cause may be related to the hydrochlorothiazide but he may have SIADH. PLAN: Decrease fluids and recheck sodium in 2 weeks.  Stay off hydrochlorothiazide.      Mumps      Pulmonary emphysema (H) 4/6/2018    PFT 4/19/2018 results:FVC=59%, FEV1=23%, FEV1/FVC=30%, DLOC=36%. IMPRESSION:Severe Airflow Obstruction  -Emphysematous Type, Reversible Component.  Possible extrathoracic obstruction or poor inspiratory effort PFT 10/16/2019 results:FVC=61%, FEV1=22%, FEV1/FVC=29%, DLOC=unable to do.   The FVC, FEV1 and FEV1/FVC ratio are reduced.  The inspiratory flow rates are reduced.  The FVC is reduced relati     Tobacco abuse 4/6/2018       Patient Active Problem List    Diagnosis Date Noted     COPD exacerbation (H) 03/25/2021     Priority: Medium     Acute on chronic respiratory failure (H) 01/05/2021     Priority: Medium     Pulmonary nodules 11/20/2020     Priority: Medium     Chest CT  3/25/2021:IMPRESSION:  1.  No evidence for pulmonary embolism.   2.  Severe emphysema.  3.  Multiple new pulmonary nodules primarily in the left lung anyone of which could represent a primary lung cancer. Multifocal lung cancer or alternatively an infectious or inflammatory process are also considered. Correlate clinically for infection.   Management options include pulmonary consultation, short-term follow-up imaging after treatment, percutaneous biopsy considered less viable options given severe emphysema.       Skin lesion 09/27/2019     Priority: Medium     Hyponatremia 02/17/2019     Priority: Medium     2/17/2019:His sodium remains low but improved some off the hydrochlorothiazide.  He has high urine sodium and urine osmolality so cause may be related to the hydrochlorothiazide but he may have SIADH.  PLAN: Decrease fluids and recheck sodium in 2 weeks.  Stay off hydrochlorothiazide.        Pulmonary emphysema (H) 04/06/2018     Priority: Medium     PFT 4/19/2018 results:FVC=59%, FEV1=23%, FEV1/FVC=30%, DLOC=36%.  IMPRESSION:Severe Airflow Obstruction  -Emphysematous Type, Reversible Component.  Possible extrathoracic obstruction or poor inspiratory effort  PFT 10/16/2019 results:FVC=61%, FEV1=22%, FEV1/FVC=29%, DLOC=unable to do.    The FVC, FEV1 and FEV1/FVC ratio are reduced.  The inspiratory flow rates are reduced.  The FVC is reduced relative to the SVC indicating air trapping.  The TLC, RV, FRC and RV/TLC ratio are all increased indicating overinflation and air trapping.  Following administration of bronchodilators, there is a significant response indicated by the increased FVC.  Severe airway obstruction and overinflation are present.  A clinical trial of bronchodilators may be beneficial in view of the airway obstruction.  IMPRESSION:Severe Airflow Obstruction.   Unable to complete DLCO.  Possible extrathoraacic obstruction.          Essential hypertension with goal blood pressure less than 140/90  2018     Priority: Medium     Hyperlipidemia LDL goal <100 2018     Priority: Medium     Tobacco abuse 2018     Priority: Medium        Past Surgical History   I have reviewed this patient's surgical history and updated it with pertinent information if needed.  No past surgical history on file.    Social History   I have reviewed this patient's social history and updated it with pertinent information if needed.  Social History     Tobacco Use     Smoking status: Former Smoker     Packs/day: 0.50     Years: 44.00     Pack years: 22.00     Types: Cigarettes     Quit date: 2021     Years since quittin.9     Smokeless tobacco: Never Used     Tobacco comment: started at age 13,  3-4 cigs daily   Substance Use Topics     Alcohol use: No     Drug use: No       Family History   I have reviewed this patient's family history and updated it with pertinent information if needed.   Family History   Problem Relation Age of Onset     Kidney Cancer Mother      Other Cancer Father         lung     Cerebrovascular Disease Father        Prior to Admission Medications   Prior to Admission Medications   Prescriptions Last Dose Informant Patient Reported? Taking?   OXYGEN-HELIUM IN   Yes No   Si L 2 LPM continuous   [employer]   albuterol (VENTOLIN HFA) 108 (90 Base) MCG/ACT inhaler  Spouse/Significant Other No No   Sig: INHALE 2 PUFFS INTO THE LUNGS EVERY 4 HOURS AS NEEDED FOR SHORTNESS OF BREATH, DIFFICULTY BREATHING OR WHEEZING.   aspirin 81 MG tablet  Spouse/Significant Other Yes No   Sig: Take 81 mg by mouth every morning    atorvastatin (LIPITOR) 20 MG tablet  Spouse/Significant Other No No   Sig: Take 1 tablet (20 mg) by mouth daily   Patient taking differently: Take 20 mg by mouth every morning    azithromycin (ZITHROMAX) 250 MG tablet   Yes No   Sig: Take 250 mg by mouth Take Mon/Wed/Fri   budesonide-formoterol (SYMBICORT) 160-4.5 MCG/ACT Inhaler   No No   Sig: Inhale 2 puffs into the lungs 2 times  daily   cyclobenzaprine (FLEXERIL) 10 MG tablet   No No   Sig: Take 1 tablet (10 mg) by mouth 3 times daily as needed for muscle spasms   dutasteride (AVODART) 0.5 MG capsule   No No   Sig: Take 1 capsule (0.5 mg) by mouth daily   guaiFENesin (MUCINEX) 600 MG 12 hr tablet   No No   Sig: Take 1 tablet (600 mg) by mouth 2 times daily   ibuprofen (ADVIL/MOTRIN) 200 MG capsule   No No   Sig: Take 2 capsules (400 mg) by mouth every 4 hours as needed for fever ; do not use while taking both prednisone and aspirin.   ipratropium - albuterol 0.5 mg/2.5 mg/3 mL (DUONEB) 0.5-2.5 (3) MG/3ML neb solution   Yes No   Sig: Inhale 3 mLs into the lungs   ipratropium-albuterol (COMBIVENT RESPIMAT)  MCG/ACT inhaler  Spouse/Significant Other No No   Sig: INHALE ONE PUFF BY MOUTH FOUR TIMES A DAY DUE FOR PHYSICAL   Patient taking differently: Inhale 1 puff into the lungs 4 times daily INHALE ONE PUFF BY MOUTH FOUR TIMES A DAY DUE FOR PHYSICAL   lisinopril (ZESTRIL) 20 MG tablet  Spouse/Significant Other No No   Sig: Take 1 tablet (20 mg) by mouth daily   Patient taking differently: Take 20 mg by mouth every morning    multivitamin, therapeutic with minerals (MULTI-VITAMIN) TABS tablet  Spouse/Significant Other Yes No   Sig: Take 1 tablet by mouth every morning    order for DME  Spouse/Significant Other No No   Sig: Equipment being ordered: Oxygen with portability. 2 liters per nasal cannula continuous.   oxyCODONE-acetaminophen (PERCOCET) 5-325 MG tablet   No No   Sig: Take 1 tablet by mouth every 4 hours as needed for breakthrough pain   predniSONE (DELTASONE) 10 MG tablet   No No   Sig: In case of flare-up, take 4 pills daily for 4 days, then 3 pills daily for 4 days, then 2 pills daily for 4 days, then 1 pill daily for 4 days.   predniSONE (DELTASONE) 10 MG tablet   No No   Sig: Take 1 tablet (10 mg) by mouth daily   predniSONE (DELTASONE) 5 MG tablet   Yes No   Sig: Take 5 mg by mouth daily   tamsulosin (FLOMAX) 0.4 MG capsule    No No   Sig: TAKE 1 CAPSULE (0.4 MG) BY MOUTH AT BEDTIME   tiotropium (SPIRIVA RESPIMAT) 2.5 MCG/ACT inhaler   Yes No   Sig: Inhale 2 puffs into the lungs daily      Facility-Administered Medications: None     Allergies   No Known Allergies    Physical Exam   Vital Signs: Temp: 98.4  F (36.9  C) Temp src: Axillary BP: (!) 162/100 Pulse: 101   Resp: (!) 60 SpO2: 96 % O2 Device: BiPAP/CPAP Oxygen Delivery: 20 LPM  Weight: 140 lbs 0 oz    Gen: Cachectic, chronically ill-appearing male, resting in bed, increased WOB  Head: atraumatic bitemporal wasting; sclera non-injected, anicterric; oral mucosa moist, no lesions, no exudates  Neck: supple without spinal abnormality  Chest: Prolonged expirations bilaterally, no wheezes, no rhonchi, no rales.  Cardiovascular: Heart sound distant, tachy with regular rhythm, no gallops or rubs, no murmurs, no edema  Abdomen: bowel sounds normal, no hepatosplenomegaly, no masses, non-tender, non-distended, no guarding, no rebound tenderness  Musculoskeletal: generalized muscle atrophy, normal muscle tone  Skin: no rashes, no chronic venous stasis  Lymph: no lymphadenopathy.  Neuro: cranial nerves II-XII intact, strength in all four extremities normal, reflexes normal, coordination normal.    Data   Data reviewed today: I reviewed all medications, new labs and imaging results over the last 24 hours. I personally reviewed the EKG tracing showing sinus tachycardia with occasional PACs and the chest x-ray image(s) showing Heart size is normal. Heavy atherosclerotic calcifications of aorta. Lungs hyperinflated consistent with COPD. Slight diffuse interstitial prominence likely relates to chronic fibrosis. No focal pneumonia..    Recent Labs   Lab 12/05/21  0517 12/05/21  0050   WBC  --  12.1*   HGB  --  10.8*   MCV  --  93   PLT  --  403   * 121*   POTASSIUM 5.0 4.6   CHLORIDE 79* 80*   CO2 36* 33*   BUN 16 12   CR 0.35* 0.33*   ANIONGAP 4 8   JUANA 9.2 9.4   * 152*         Recent  Results (from the past 24 hour(s))   XR Chest Port 1 View    Narrative    EXAM: XR CHEST PORT 1 VIEW  LOCATION: [facility]  DATE/TIME: 12/5/2021 1:05 AM    INDICATION: Shortness of breath  COMPARISON: None.      Impression    IMPRESSION: Heart size is normal. Heavy atherosclerotic calcifications of aorta. Lungs hyperinflated consistent with COPD. Slight diffuse interstitial prominence likely relates to chronic fibrosis. No focal pneumonia.

## 2021-12-05 NOTE — ED PROVIDER NOTES
61-year-old male with COPD presented in acute exacerbation of chronic respiratory failure of uncertain trigger currently on BiPAP with improving respiratory acidosis and hypercapnia awaiting bed placement.  Covid testing is negative.    He remained stable on BiPAP awaiting bed placement.    He transferred to the ICU in stable condition.     Elpidio Elizondo MD  12/05/21 [id number]

## 2021-12-05 NOTE — PROGRESS NOTES
"[facility] ADMISSION NOTE    Patient admitted to room 1002 at approximately 0900 via cart from emergency room. Patient was accompanied by nurse.     Verbal SBAR report received from Immanuel prior to patient arrival.     Patient trasferred to bed via air merly. Patient alert and oriented X 3. The patient is not having any pain.  . Admission vital signs: Blood pressure (!) 162/100, pulse 101, temperature 98.4  F (36.9  C), temperature source Axillary, resp. rate (!) 60, height 1.702 m (5' 7\"), weight 63.5 kg (140 lb), SpO2 96 %. Patient was oriented to plan of care, call light, bed controls, tv, telephone, bathroom and visiting hours.     Risk Assessment    The following safety risks were identified during admission: none. Yellow risk band applied: NO.     Skin Initial Assessment    This writer admitted this patient and completed a full skin assessment and Rayo score in the Adult PCS flowsheet. Appropriate interventions initiated as needed.     Secondary skin check completed by ALIS Cason RN.    Redness on bilat heels and bridge of nose.          Education    Patient has a [place] to Observation order: No  Observation education completed and documented: N/A      Yary Cabrera RN    "

## 2021-12-05 NOTE — PLAN OF CARE
Pt resting in bed all shift, moves around frequently in bed. Uses urinal in bed to void. Denies pain. LS dimin alternating with wheezes, infrequent cough. Maintains sats on Bipap and Oxymizer or Oxymask.  Pt with waning confusing all day, pulling bipap mask off sats dipping down to 50s, though also found with oxymizer off and sats low. Pt states understanding needing to keep supp O2 on, though continues to pull at O2 and other cords. When wearing oxymask/mizer, pt with increased work of breathing, using abd muscles increasingly. C/o nausea, admin prn zofran, did not feed lunch d/t nausea and wearing bipap mask. Daughter now at bedside. Pt has rested peacfully in bed for few hours this afternoon. This RN made pt aware will apply bilat hand mits if continues to pull at things. Pt verbalized good understanding, though hard to assess retention Yary Cabrera RN BSN

## 2021-12-06 NOTE — PROGRESS NOTES
Bathed patient , needed to take off Bipap for short period and oxygen saturation dropped to 83%, patient very anxious, requesting something for breathing discomfort.Gave morphine 1mg IV,  Then minute later another one mg as patient anxious. Nose and cheek protectors placed for reddened areas of face.   Patient calm now. Will continue to watch for changes .

## 2021-12-06 NOTE — PROGRESS NOTES
Pt continues to feel nauseated even after Zofran and Reglan,  Tele Mechoopda notified, orders for Compazine for neausea and Morphine IV for his air hunger.  Pt was also taken off Bi-Pap and on a Oxymizer 8 L till nausea under control.  Will continue to monitor close for changes.

## 2021-12-06 NOTE — PROGRESS NOTES
[facility]    Hospitalist Progress Note    Date of Service (when I saw the patient): 12/06/2021    Assessment & Plan   Camacho Covington is a 61 year old male admitted on 12/5/2021. He has end-stage COPD on chronic oxygen 2-3 L/min nasal cannula, he presented with increased shortness of breath and was admitted with a COPD exacerbation and acute hypoxic respiratory failure.     COPD exacerbation  Acute on chronic hypoxic and hypercapnic respiratory failure  The patient is chronically on 2-3 L oxygen via nasal cannula.  PFTs are consistent with very severe obstructive lung disease without reversibility.  FEV1 to FVC ratio was 28%, FEV1 is (0.62L) 19%, and FVC is (2.17L) 62% of predicted.  He is steroid-dependent and chronically on prednisone 5 mg daily.  In the ED, initial VBG 7.23 / 91 / 78/ 38.  After BiPAP, improved to 7.31 / 78 / 56 / 39  The patient is admitted to the intensive care unit due to need for BiPAP and need for close monitoring due to high risk for respiratory decompensation.  Will attempt to keep oxygen saturations between 90 to 94% to avoid carbon dioxide retention.  The patient was treated with azithromycin and ceftriaxone emergency department, but there is no infiltrate on chest x-ray.  Will check procalcitonin and consider discontinuing ceftriaxone and resuming prior to admission thrice weekly azithromycin.  We will continue prior to admission Symbicort and Spiriva.  Will order albuterol nebs every 4 hours while awake.  - Patient hypercapnic early AM 12/6 after being placed on oxymizer and titrated to SaO2 of 100%, ABG 7.24 / 107 / 35 / 46.  Emphasized to nursing the need to maintain sats 90-94%, and no higher to avoid hypercapnic encephalopathy.     Hyponatremia  Serum sodium was 121 on presentation to the emergency room.  Sodium decreased to 119 this morning.  Will hold saline infusion and check urine sodium and urine osmolality to determine etiology of hyponatremia.   Continue every 6 hour sodium checks.  - U Na 13 and U Osm 535, consistent with prerenal hyponatremia  - Continue IV NS at 100 ml/hr  - Continue q 6 hr Na checks  - Repeat BMP in AM     Hypertension  Continue prior to admission lisinopril.     Hyperlipidemia  Continue prior to admission atorvastatin.     Pulmonary nodules  Chest CT on 3/25/2021 demonstrates multiple new pulmonary nodules primarily in the left lung concerning for multifocal lung cancer.  The patient is felt to be too high risk for the multiple biopsies needed to establish a diagnosis and rule out malignancy.     Benign prostatic hypertrophy  Continue prior to admission dutasteride and tamsulosin.    Clinically Significant Risk Factors Present on Admission          # Platelet Defect: home medication list includes an antiplatelet medication         Diet: Regular Diet Adult    DVT Prophylaxis: Enoxaparin (Lovenox) SQ  Smith Catheter: Not present  Code Status:  DNR/DNI, confirmed with patient on admission and again on 12/6.     Disposition: Expected discharge in 2-3 days once hypercapnea improved and back to baseline 2-3 L.    Earl Quiroz    Interval History   The patient is resting in bed.  He has no acute complaints.    -Data reviewed today: I reviewed all new labs and imaging results over the last 24 hours. I personally reviewed no images or EKG's today.    Physical Exam   Temp: 98.4  F (36.9  C) Temp src: Axillary BP: (!) 149/97 Pulse: 106   Resp: (!) 34 SpO2: 98 % O2 Device: BiPAP/CPAP Oxygen Delivery: 8 LPM  Vitals:    12/05/21 0126 12/06/21 0800   Weight: 63.5 kg (140 lb) 63.3 kg (139 lb 8.8 oz)     Vital Signs with Ranges  Temp:  [98.4  F (36.9  C)-100.3  F (37.9  C)] 98.4  F (36.9  C)  Pulse:  [] 106  Resp:  [17-56] 34  BP: ()/() 149/97  FiO2 (%):  [30 %-50 %] 40 %  SpO2:  [72 %-100 %] 98 %  I/O last 3 completed shifts:  In: 1076.67 [P.O.:650; I.V.:426.67]  Out: 1000 [Urine:1000]    Gen: Cachectic, chronically  ill-appearing male, resting in bed, increased WOB, on BiPAP  HEENT: Atraumatic, normocephalic; sclera non-injected, anicterric; oral mucosa moist  Lungs: Prolonged expiration, no wheezes, no rhonchi, no rales  Heart: Regular rate, regular rhythm, no gallops, no rubs, no murmurs  GI: Bowel sound normal, no hepatosplenomegaly, no masses, non-tender, non-distended, no guarding, no rebound tenderness  Lymph: No lymphadenopathy, no edema  Skin: No rashes, no chronic venous stasis     Medications     sodium chloride 100 mL/hr at 12/05/21 2306       aspirin  81 mg Oral Daily     atorvastatin  20 mg Oral QAM     azithromycin  250 mg Intravenous Q24H     cefTRIAXone  2 g Intravenous Q24H     dutasteride  0.5 mg Oral Daily     enoxaparin ANTICOAGULANT  40 mg Subcutaneous Q24H     fluticasone-vilanterol  1 puff Inhalation Daily     guaiFENesin  1,200 mg Oral BID     ipratropium - albuterol 0.5 mg/2.5 mg/3 mL  3 mL Nebulization Q4H     lisinopril  20 mg Oral QAM     methylPREDNISolone  62.5 mg Intravenous Q12H     tamsulosin  0.4 mg Oral At Bedtime     umeclidinium  1 puff Inhalation Daily       Data   Recent Labs   Lab 12/06/21  0612 12/05/21  2354 12/05/21  2348 12/05/21 2009 12/05/21  1805 12/05/21  1240 12/05/21  0517 12/05/21  0050   WBC 16.2*  --   --   --   --   --   --  12.1*   HGB 10.3*  --   --   --   --   --   --  10.8*   MCV 98  --   --   --   --   --   --  93     --   --   --   --   --   --  403   *  128* 127*  --   --  124*   < > 119* 121*   POTASSIUM 5.1  --   --   --   --   --  5.0 4.6   CHLORIDE 86*  --   --   --   --   --  79* 80*   CO2 41*  --   --   --   --   --  36* 33*   BUN 23  --   --   --   --   --  16 12   CR 0.41*  --   --   --   --   --  0.35* 0.33*   ANIONGAP 1*  --   --   --   --   --  4 8   JUANA 9.0  --   --   --   --   --  9.2 9.4   *  --  100* 116*  --    < > 139* 152*    < > = values in this interval not displayed.       No results found for this or any previous visit (from  the past 24 hour(s)).

## 2021-12-06 NOTE — PROGRESS NOTES
Pt's N/V has improved and he is more comfortable and able to sleep. He is on a Oxymizer canula 8L O2, sat's are >92%.  Lung sounds are very diminished t/o with scattered exp. and inspiratory wheezes, with crackles in the bases.  Pt gets very dyspneic and SOB with any movement in the bed, RR in the 20'-30's.  Pt uses the urinal in bed, he uses his call light appropriately and is A&O X 4.  Will continue to monitor close for changes.

## 2021-12-06 NOTE — PROGRESS NOTES
[facility] Medicine   Cross Cover Note  Date of Service: 12/6/2021     I was called due to critical value on patient's VBG: pH 7.24, CO2 107, Bicarb 46. See last several VBGs as below:    Venous Blood Gas  Recent Labs   Lab 12/06/21  0612 12/05/21  0515 12/05/21  0204 12/05/21  0050   PHV 7.24* 7.31* 7.26* 7.23*   PCO2V 107* 78* 86* 91*   PO2V 35 56* 77* 78*   HCO3V 46* 39* 38* 38*   MARÍA ELENA 14.6* 10.5* 8.8* 7.5*   O2PER 35 50 70 70     Patient awake, noted to have tachypnea with increased work of breathing. Lungs with poor air movement, diminished. Tachycardic. Currently on nasal oxymizer 8 LPM and oxygenation noted to be 100%.     Acute Respiratory Failure Hypercapnia  Due to COPD exacerbation. BiPAP was removed overnight due to uncontrolled nausea and oxygen saturations have been near 100% on nasal oximyzer. Suspect recurrent retention currently is due to both break from BiPAP and possibly retention driven by high oxygenation.   - restart BiPAP, target oxygenation 89-93%  - VBG ordered for 1.5 hours from BiPAP start time  - scheduled Duonebs ordered Q4 hours, first one now  - continue morphine prn dyspnea and nausea protocol  - continue IV methylprednisolone, will switch to BID dosing    Goals of Care Discussion:  Code status was not ordered on admission, discussed with patient this morning. He initially indicated DNR/DNI which has previously been ordered and expressed that he would not want intubation if he would not be able to get off the ventilator. Upon further discussion, he indicated that he may want to be Full Code. He was sleepy during this conversation and so it was not clear if he was fully tracking. Plan to initiate BiPAP and have daytime provider, Dr. Quiroz, re-address with patient following BiPAP trial.     Shantel Haque PA-C

## 2021-12-06 NOTE — PROGRESS NOTES
Pt was put back on Bi-Pap at 0640, FIO2 was 35% pt's sats were %, decreased FIO2 to 30%.  Will continue to monitor.

## 2021-12-06 NOTE — PROGRESS NOTES
Patient became very   somnolent   after giving another dose of morphine due to increased anxiety, C/O S O A, oxygen saturation dropped to 80% on the bipap, patient only responsive to pressure of chest, gave 4mg of narcan, Patient now awake, responsive to voice. Will continue to watch.

## 2021-12-06 NOTE — PROGRESS NOTES
Pt's FIO2 on the Bi-Pap was at 30%, his sat's dropped into the upper 70's, slowly increased his oxygen to 50% to maintain his sat's to >89%.  Will continue to monitor close for changes.

## 2021-12-07 NOTE — PROGRESS NOTES
Pt continues on BIPAP 18/8 40%, tachypneic, able to come off mask briefly for sips of water with meds. BP soft but stable. Pt states comfort at present, wishes to sleep/rest. Will continue to monitor.

## 2021-12-07 NOTE — PROGRESS NOTES
Patient abdomen distended more so than than previous day, patient appears uncomfortable, denies pain.Oxygen saturation in 80,s.Palliative care visit this morning.Patient unable to void, bladder scanned difficult to see volume as air in stomach appeared enhanced. Straight cath for 70cc tomasa urine.

## 2021-12-07 NOTE — PROGRESS NOTES
[facility]    Hospital Medicine   Cross Cover Note  Date of Service: 12/7/2021     I was called due to low hemoglobin and coffee ground emesis from NG.    Day team ordered abdominal x-ray, patient has dilated loops of small bowel concerning for SBO.   Had NG placed today now with coffee ground emesis from NG.  Hgb checked, found to be 6.1  Patient developed hypotension and worsening respiratory status.    Evaluated patient in person, tachypneic with some increased work of breathing despite BiPAP.  Slightly confused, unable to appropriately answer questions.    Patient's daughter is at bedside, assisted with goals of care discussions; is aware his disease is end stage but wants to honor his wishes that he can go home on hospice if possible, but understands current situation is tenuous.  After discussing risk vs benefit of blood transfusion patient and daughter in agreement.  Discussed need for PICC if they want to pursue more aggressive management of hypotension, which they agreed.     Plan:   Discussed case with Dr. Lv Nevarez.    Discussed case with Dr. David Waller, general surgery, who said patient is too unstable at this time to pursue EGD, but if he stabilizes then it could be considered in the morning.    RT evaluated patient and BiPAP settings, unable to go up anymore from current settings or make further adjustments, continue with plan of care      - 2U PRBC ordered  - 1.5L LR bolus ordered   - hemoglobin q6h  - Conditional transfusion orders for hemoglobin < 7  - PICC line placed 12/7  - Norepinephrine to maintain MAP > 65  - NPO  - General Surgery consult  - IV Protonix drip started  - Aspirin and Lovenox on hold  - Antihypertensive medications on hold        Angela Orosco PA-C  [facility] Hospitalist Service  Pager: [phone]

## 2021-12-07 NOTE — CONSULTS
[facility] - Palliative Care Consultation Note    Patient: Camacho Covington  Date of Admission:  12/5/2021    Requesting Clinician / Team: Dr. Quiroz  Reason for consult: Goals of care    Assessment/Recommendations:  1)   GOALS OF CARE per patient    Restorative  With a good understanding that he has end stage COPD and would like more time, if possible.  His daughter hopes we can 'tune' her dad up and then discharge him home with home hospice services, if he is agreeable to that and she thinks he might be.  She thinks he would be very resistant to going to TCU.    Code Status:  DNR/DNI    2)    ADVANCED CARE PLANNING    Patient has completed health care directive:  no    Documented health care agent:  n/a    Surrogate decision maker if no appointed agent:  Daughter Mariah    3)    SYMPTOM MANAGEMENT      Dyspnea--I recommend Camacho use the oral morphine solution (2.5 mg po q 4 hours prn) rather than IV injections    Stomach distention--discussed with bedside nurse using simethicone    No other unmet symptom burden identified    4)    PSYCHOSOCIAL/SPIRITUAL SUPPORT    Camacho denies any PS and S needs at this time.    These recommendations have been discussed with Dr. Quiroz.      Thank you for the opportunity to participate in the care of this patient and family.      During regular M-F work hours -- if you are not sure who specifically to contact -- please contact us by calling [phone].        Palliative Care Assessment      Prognosis, Goals, and/or Advance Care Planning were addressed today:  YES    Mood, coping, and/or meaning in the context of serious illness were addressed today: YES    Summary of Palliative Encounter:  I discussed the reason for Palliative Care Referral and our role in symptom management, patient family communication, understanding their choices for medical treatment, and providing  guidance in making difficult decisions in the framework of focusing on patient comfort and quality of  "life.  Reviewed current conditions and treatments including  COPD exacerbation in the setting of end stage COPD.  There are some pulmonary nodules present on CT scan.    I met with Camacho in his ICU room.  He was lying in bed with his BiPAP in place.  He had some familiarity with palliative care from a conversation with Dr. Josh Covington, a pulmonologist at [facility], about 4 weeks ago.  He was referred to the outpatient palliative care clinic there but hadn't been seen yet.  He and Dr. Covington had discussed his code status and Camacho determined that being DNI/DNR was aligned with his goals.  Camacho understands he has a fatal condition and would like more time, if possible, and good quality time, regardless.    He provided me with a rich social history which is recorded below.  Camacho is decisional.  He affirmed his intent to not be intubated or mechanically ventilated.  He understands his exacerbations are happening more frequently and he is taking longer to recover from them.  Recently he has needed 10-14 days in the acute care hospital.  He also doesn't feel like he gets back to his previous baseline, either.  He isn't ready for hospice and he is aware that he would benefit from it when he does decide to enroll.    Camacho feels that his daughter Mariah Xiong) best knows his wishes regarding medical care and they have talked it over to some extent.  He would like the team to turn to her if decisions are needed and he can't make them.      I spoke with Mariah by phone.  She provided a lot of background on Camacho's smoking and emphysema history.  She stressed he has worked to avoid doctors and hospitals when possible.  He was able to quit smoking in early 2021 and was surprised how much better he felt. He has really enjoyed his relationship with the pulmonary team at [place] especially Dr. Zelaya.  He did not do pulmonary rehab because of  transportation issues.  \"He made the most of his good days\" otherwise he was " essentially housebound.  She confirmed he wouldn't 'want to be hooked up to machines or go to care facilities besides the acute hospital.'  Her goal would be to 'tune him up as much as possible and then discharge him home likely with hospice services, if he agreed to that.'  She has been urging his other children and siblings to come and see him and 'have the conversations you've been putting off...'  She says she gets a lot of support from her  at this time.  She did mention that Camacho's current wife gets very emotional thinking about how sick he is and occasionally goes on drinking binges.  She does take good care of him at home when he is there.    Medical Situation: Camacho is a 61 year old man with a long history of COPD.  He is chronically on 2-3 L oxygen via nasal cannula.  PFTs are consistent with very severe obstructive lung disease without reversibility.  FEV1 to FVC ratio was 28%, FEV1 is (0.62L) 19%, and FVC is (2.17L) 62% of predicted.  He is steroid-dependent and chronically on prednisone 5 mg daily.  He has been in a pattern of being admitted about every 6 months for the past few years with an exacerbation.  He is also known to have some small nodules that may represent cancer in his lungs, but he has not been stable enough to have them evaluated.  Camacho had respiratory depression yesterday possibly due to opiate administration and it resolved with administration of Narcan.    Previous Palliative Care Encounters:  None on file though Camacho was referred to the outpatient palliative care clinic about a month ago    Functional Status:     Functional Status two weeks prior to hospitalization:   PPS (0 = death; 100 = normal):   50%- 1. Mainly sit/lie; 2. Unable to do any work, extensive disease; 3. Considerable assistance required; 4. Normal or reduced; 5. Full or confusion Camacho does drive a little and he uses a walker for ambulating 'mainly so I can lean on it to catch my breath.'    Functional  "status Current:    PPS: (0 = death; 100 = normal):   20%- 1. Totally bed bound; 2. Unable to do any activity, extensive disease; 3. Total care; 4. Minimal to sips; 5. Full or drowsy +/- confusion     Prognosis, Goals, & Planning:   Prognosis (quality & quantity): guarded   Basis for estimate:  Clinical judgment  High risk of death within one year? YES     Patient's decision making preferences: shared with support from loved ones    Capacity evaluation:    Camacho DOES have capacity to make a decision regarding COMPLEX GOALS OF CARE based on the following:    ABILITY to understand relevant information about his/her condition.    ABILITY to demonstrate understanding of his/her illness and care needs.    ABILITY to demonstrate reasoning to make decisions regarding his/her illness.    ABILITY to communicate his/her options for treatment.         I have concerns about the patient/family's health literacy today: NO            Patient has a completed Health Care Directive: No.       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56    Relationships:  X 3-his 3rd marriage has lasted 20 years; 4 children from the first two marriages; 7 grand children      Patient is 'famous for...nothing I can think of...' Daughter Mariah says her dad is the 'hardest working  in the region--he didn't bark out orders but did the work and took pride in getting the work done.\"    Spirituality: no [employer] affiliation; does not think of himself as 'spiritual.' gets meaning out of family relationships     Alcohol: none for several years 'he exchanged being a workaholic for being an alcoholic.'    Tobacco: quit about a year ago     Drugs:  None recently      Living situation: lives with wife in a trailer on an acre of land    Key family / caregivers: wife      History of Present " Illness:  History gathered today from: patient and Epic record    Camacho Covington is a 61 year old male admitted on 12/5/2021. He has end-stage COPD on chronic oxygen 2-3 L/min nasal cannula, he presented with increased shortness of breath and was admitted with a COPD exacerbation and acute hypoxic respiratory failure.    Key Palliative Symptom Data:  # Pain severity the last 12 hours: none  # Dyspnea severity the last 12 hours: low  # Nausea severity the last 12 hours: none  # Anxiety severity the last 12 hours: none  Camacho's abdomen is distended 2/2 to the BiPAP but he denies discomfort or nausea    Patient is on opioids: assessed and bowels ok/no needed changes to plan of care today.    ROS:  Comprehensive ROS is reviewed and is negative except as here & per HPI: poor appetite     Past Medical History:  Past Medical History:   Diagnosis Date     Essential hypertension with goal blood pressure less than 140/90 4/6/2018     Hyperlipidemia LDL goal <100 4/6/2018     Hyponatremia 2/17/2019 2/17/2019:His sodium remains low but improved some off the hydrochlorothiazide. He has high urine sodium and urine osmolality so cause may be related to the hydrochlorothiazide but he may have SIADH. PLAN: Decrease fluids and recheck sodium in 2 weeks.  Stay off hydrochlorothiazide.      Mumps      Pulmonary emphysema (H) 4/6/2018    PFT 4/19/2018 results:FVC=59%, FEV1=23%, FEV1/FVC=30%, DLOC=36%. IMPRESSION:Severe Airflow Obstruction  -Emphysematous Type, Reversible Component.  Possible extrathoracic obstruction or poor inspiratory effort PFT 10/16/2019 results:FVC=61%, FEV1=22%, FEV1/FVC=29%, DLOC=unable to do.   The FVC, FEV1 and FEV1/FVC ratio are reduced.  The inspiratory flow rates are reduced.  The FVC is reduced relati     Tobacco abuse 4/6/2018        Past Surgical History:  No past surgical history on file.      Family History:  Family History   Problem Relation Age of Onset     Kidney Cancer Mother      Other Cancer  Father         lung     Cerebrovascular Disease Father          Allergies:  No Known Allergies     Medications:  I have reviewed this patient's medication profile and medications from this hospitalization.       aspirin  81 mg Oral Daily     atorvastatin  20 mg Oral QAM     azithromycin  250 mg Intravenous Q24H     cefTRIAXone  2 g Intravenous Q24H     dutasteride  0.5 mg Oral Daily     enoxaparin ANTICOAGULANT  40 mg Subcutaneous Q24H     fluticasone-vilanterol  1 puff Inhalation Daily     guaiFENesin  1,200 mg Oral BID     ipratropium - albuterol 0.5 mg/2.5 mg/3 mL  3 mL Nebulization Q4H     lisinopril  20 mg Oral QAM     methylPREDNISolone  62.5 mg Intravenous Q12H     tamsulosin  0.4 mg Oral At Bedtime     umeclidinium  1 puff Inhalation Daily       PRN MEDS:   albuterol, alum & mag hydroxide-simethicone, cyclobenzaprine, metoclopramide, morphine, morphine, naloxone **OR** naloxone **OR** naloxone **OR** naloxone, ondansetron **OR** ondansetron, prochlorperazine **OR** prochlorperazine **OR** prochlorperazine, simethicone     Morphine Equivalent Daily Dose: 20 mg (#2-2 mg IV doses of MS for 4 mg total)    Physical Exam:   Temp  Min: 97.6  F (36.4  C)  Max: 98.4  F (36.9  C)    Pulse  Min: 97  Max: 125    Resp  Min: 25  Max: 75    BP  Min: 82/51  Max: 134/73    SpO2  Min: 88 %  Max: 100 %        General Appearance: cachectic man with broad shoulders, a barrel chest and distended upper abdomen; wearing BiPAP and able to speak in 4-6 word phrases  Head: Normocephalic, without obvious abnormality, atraumatic.   Eyes: Conjunctivae/corneas clear.   Throat: Lips, mucosa, and tongue moist; teeth and gums normal.   Resp:somewhat labored with BiPAP; using abdominal muscles and he is lying with his neck extended  CV: RRR at radial pulse  Abdomen: Scaphoid, distended in mid epigastrium, Soft, nontender, bowel sounds active all four quadrants,   Extremities: Normal, atraumatic, no cyanosis, clubbing or edema.   Skin: Warm  and dry, no rashes in exposed areas.   Neurologic: A&O X4, LY  Psych:  Calm, cooperative,         Intake/Output Summary (Last 24 hours) at 12/7/2021 1141  Last data filed at 12/7/2021 1000  Gross per 24 hour   Intake 4080 ml   Output 900 ml   Net 3180 ml          Data reviewed:  Reviewed recent pertinent imaging, comments:   12/5/2021 CT CHEST PE W/CONTRAST  IMPRESSION:  1.  No evidence for pulmonary embolism.  2.  Advanced emphysema.  3.  Scattered indeterminate pulmonary nodules in both lungs have undergone mixed change. These may be infectious or inflammatory in etiology, however a follow-up CT in three months is recommended to ensure resolution.  4.  Multiple age-indeterminate thoracolumbar vertebral body compressions have increased since the previous exam, and an acute vertebral body compression is not excluded. Please clinically correlate.    Reviewed recent labs, comments:   Lab Results: personally reviewed    Data   Recent Labs   Lab 12/06/21  0612   WBC 16.2*   HGB 10.3*      POTASSIUM 5.1   CHLORIDE 86*   CO2 41*   BUN 23   CR 0.41*   JUANA 9.0   *     Lab Results   Component Value Date/Time    ALBUMIN 3.7 03/25/2021 04:26 PM       Weights:   Vitals:    12/05/21 0126 12/06/21 0800 12/07/21 0549   Weight: 63.5 kg (140 lb) 63.3 kg (139 lb 8.8 oz) 64.5 kg (142 lb 3.2 oz)    Body mass index is 22.27 kg/m .        Information gathered today from: family/loved ones, medical team members, unit team members and chart review in Epic.    TTS: I have personally spent a total of 93 minutes  today on the unit in review of medical record, consultation with the medical providers and assessment of patient today, with more than 50% of this time spent in counseling about goals of care, coordination of care, and conversation in a family meeting with trenton Tadeo  by phone  re: diagnostic results, prognosis, symptom management, emotional support, risks and benefits of management options, and development of  plan of care.     Sabas Martinez MD MS FAAFP  "[facility] [place] Pall[facility]  Office [phone]  Fax [phone]    During regular M-F work hours -- if you are not sure who specifically to contact -- please contact us by calling [phone].

## 2021-12-07 NOTE — PROGRESS NOTES
NG placed for decompression. 1800 black coffee grounds return. Keep to low intermittent suction\ per MD and check Hgb. Patient denies further discomfort. Will continue plan of care.

## 2021-12-07 NOTE — PROGRESS NOTES
Patient having difficulty breathing ,RR 49,patient anxious, repositioned gave break from bipap for water and juice.Placed on oxim N/C at 5Loff of bipap, and breathing appears labored . Back on the bipap, patient still using abdomen muscles  with assisted breathing. Patient appears to be uncomfortable despite repositioning and measures for comfort.

## 2021-12-07 NOTE — PROGRESS NOTES
Sepsis Evaluation Progress Note    I was called to see Camacho Covington due to a change in vital signs. He is not known to have an infection.     Physical Exam   Vital Signs:  Temp: 97.7  F (36.5  C) Temp src: Axillary BP: (!) 88/56 Pulse: 86   Resp: (!) 67 SpO2: 98 % O2 Device: BiPAP/CPAP       General: chronically ill appearing  Mental Status: AAOx4.     Remainder of physical exam is significant for   Gen: Cachectic, chronically ill-appearing male, resting in bed, increased WOB, on BiPAP  HEENT: Atraumatic, normocephalic; sclera non-injected, anicterric; oral mucosa moist  Lungs: Prolonged expiration, no wheezes, no rhonchi, no rales  Heart: Regular rate, regular rhythm, no gallops, no rubs, no murmurs  GI: Bowel sound normal, no hepatosplenomegaly, no masses, non-tender, non-distended, no guarding, no rebound tenderness  Lymph: No lymphadenopathy, no edema  Skin: No rashes, no chronic venous stasis     Data   Lactic Acid   Date Value Ref Range Status   12/07/2021 2.1 (H) 0.7 - 2.0 mmol/L Final   12/06/2021 0.7 0.7 - 2.0 mmol/L Final   01/05/2021 1.1 0.7 - 2.0 mmol/L Final     Lactate for Sepsis Protocol   Date Value Ref Range Status   03/26/2021 1.7 0.7 - 2.0 mmol/L Final   01/06/2021 0.6 (L) 0.7 - 2.0 mmol/L Final       Assessment & Plan   NO EVIDENCE OF SEPSIS at this time.  Vital sign, physical exam, and lab findings are due to COPD exacerbation.    Disposition: The patient will remain on the current unit. We will continue to monitor this patient closely..  Earl Quiroz MD    Sepsis Criteria   Sepsis: 2+ SIRS criteria due to infection  Severe Sepsis: Sepsis AND 1+ new sign of acute organ dysfunction (Note: lactate >2 or acute encephalopathy each qualify as organ dysfunction)  Septic Shock: Sepsis AND hypotension despite volume resuscitation with 30 ml/kg crystalloid or lactate >=4  Note: HYPOTENSION is defined as 2 BP readings measured 3 hrs apart that have a SBP <90, MAP <65, or decrease >40 mmHg,  occurring 6 hrs before or after t-zero

## 2021-12-07 NOTE — PROGRESS NOTES
[facility]    Hospitalist Progress Note    Date of Service (when I saw the patient): 12/07/2021    Assessment & Plan   Camacho Covington is a 61 year old male admitted on 12/5/2021. He has end-stage COPD on chronic oxygen 2-3 L/min nasal cannula, he presented with increased shortness of breath and was admitted with a COPD exacerbation and acute hypoxic respiratory failure.     COPD exacerbation  Acute on chronic hypoxic and hypercapnic respiratory failure  The patient is chronically on 2-3 L oxygen via nasal cannula.  PFTs are consistent with very severe obstructive lung disease without reversibility.  FEV1 to FVC ratio was 28%, FEV1 is (0.62L) 19%, and FVC is (2.17L) 62% of predicted.  He is steroid-dependent and chronically on prednisone 5 mg daily.  In the ED, initial VBG 7.23 / 91 / 78/ 38.  After BiPAP, improved to 7.31 / 78 / 56 / 39  The patient is admitted to the intensive care unit due to need for BiPAP and need for close monitoring due to high risk for respiratory decompensation.  Will attempt to keep oxygen saturations between 90 to 94% to avoid carbon dioxide retention.  The patient was treated with azithromycin and ceftriaxone emergency department, but there is no infiltrate on chest x-ray.  Will check procalcitonin and consider discontinuing ceftriaxone and resuming prior to admission thrice weekly azithromycin.  We will continue prior to admission Symbicort and Spiriva.  Will order albuterol nebs every 4 hours while awake.  - Patient hypercapnic early AM 12/6 after being placed on oxymizer and titrated to SaO2 of 100%, ABG 7.24 / 107 / 35 / 46.  Emphasized to nursing the need to maintain sats 90-94%, and no higher to avoid hypercapnic encephalopathy.  The patient noted to be breathing with increased effort this morning, while on BiPAP.  VBG 7.21 / 89 / 23 / 36 on BiPAP 18/8 with 35% FiO2.  Patient met with Palliative Care and wishes to continue current cares, but reaffirms  DNR/DNI.     Hyponatremia  Serum sodium was 121 on presentation to the emergency room.  Sodium decreased to 119 this morning.  Will hold saline infusion and check urine sodium and urine osmolality to determine etiology of hyponatremia.  Continue every 6 hour sodium checks.  - U Na 13 and U Osm 535, consistent with prerenal hyponatremia  - Continue IV NS at 100 ml/hr  - Na 132 on 12/7, discontinue q 6 hr Na checks  - Repeat BMP in AM     Hypertension  Continue prior to admission lisinopril.     Hyperlipidemia  Continue prior to admission atorvastatin.     Pulmonary nodules  Chest CT on 3/25/2021 demonstrates multiple new pulmonary nodules primarily in the left lung concerning for multifocal lung cancer.  The patient is felt to be too high risk for the multiple biopsies needed to establish a diagnosis and rule out malignancy.     Benign prostatic hypertrophy  Continue prior to admission dutasteride and tamsulosin.    Clinically Significant Risk Factors Present on Admission          # Platelet Defect: home medication list includes an antiplatelet medication         Diet: Regular Diet Adult    DVT Prophylaxis: Enoxaparin (Lovenox) SQ  Smith Catheter: Not present  Code Status:  DNR/DNI, confirmed with patient on admission and again on 12/6.     Disposition: Expected discharge in 2-3 days once hypercapnea improved and back to baseline 2-3 L.  The patient remains critically ill and will remain in the ICU for close monitoring as he is at significant risk or rapid decompensation.    Earl Quiroz    Interval History   The patient is resting in bed.  He states his is tolerating BiPAP.  He is thirsty, but denies pain.    -Data reviewed today: I reviewed all new labs and imaging results over the last 24 hours. I personally reviewed no images or EKG's today.    Physical Exam   Temp: 97.7  F (36.5  C) Temp src: Axillary BP: (!) 88/56 Pulse: 99   Resp: 30 SpO2: 98 % O2 Device: BiPAP/CPAP    Vitals:    12/05/21 0126 12/06/21  0800 12/07/21 0549   Weight: 63.5 kg (140 lb) 63.3 kg (139 lb 8.8 oz) 64.5 kg (142 lb 3.2 oz)     Vital Signs with Ranges  Temp:  [97.6  F (36.4  C)-98.4  F (36.9  C)] 97.7  F (36.5  C)  Pulse:  [] 99  Resp:  [25-75] 30  BP: ()/(51-97) 88/56  FiO2 (%):  [35 %-40 %] 35 %  SpO2:  [88 %-100 %] 98 %  I/O last 3 completed shifts:  In: 3850 [P.O.:650; I.V.:3200]  Out: 900 [Urine:900]    Gen: Cachectic, chronically ill-appearing male, resting in bed, increased WOB, on BiPAP  HEENT: Atraumatic, normocephalic; sclera non-injected, anicterric; oral mucosa moist  Lungs: Prolonged expiration, no wheezes, no rhonchi, no rales  Heart: Regular rate, regular rhythm, no gallops, no rubs, no murmurs  GI: Bowel sound normal, no hepatosplenomegaly, no masses, non-tender, distended, no guarding, no rebound tenderness  Lymph: No lymphadenopathy, no edema  Skin: No rashes, no chronic venous stasis     Medications     sodium chloride 100 mL/hr at 12/07/21 0631       aspirin  81 mg Oral Daily     atorvastatin  20 mg Oral QAM     azithromycin  250 mg Intravenous Q24H     cefTRIAXone  2 g Intravenous Q24H     dutasteride  0.5 mg Oral Daily     enoxaparin ANTICOAGULANT  40 mg Subcutaneous Q24H     fluticasone-vilanterol  1 puff Inhalation Daily     guaiFENesin  1,200 mg Oral BID     ipratropium - albuterol 0.5 mg/2.5 mg/3 mL  3 mL Nebulization Q4H     lisinopril  20 mg Oral QAM     methylPREDNISolone  62.5 mg Intravenous Q12H     tamsulosin  0.4 mg Oral At Bedtime     umeclidinium  1 puff Inhalation Daily       Data   Recent Labs   Lab 12/07/21  0541 12/07/21  0012 12/06/21  1808 12/06/21  1306 12/06/21  0612 12/05/21  2354 12/05/21  2348 12/05/21 2009 12/05/21  1240 12/05/21  0517 12/05/21  0050   WBC  --   --   --   --  16.2*  --   --   --   --   --  12.1*   HGB  --   --   --   --  10.3*  --   --   --   --   --  10.8*   MCV  --   --   --   --  98  --   --   --   --   --  93   PLT  --   --   --   --  347  --   --   --   --   --   403   * 131* 131*   < > 128*  128*   < >  --   --    < > 119* 121*   POTASSIUM  --   --   --   --  5.1  --   --   --   --  5.0 4.6   CHLORIDE  --   --   --   --  86*  --   --   --   --  79* 80*   CO2  --   --   --   --  41*  --   --   --   --  36* 33*   BUN  --   --   --   --  23  --   --   --   --  16 12   CR  --   --   --   --  0.41*  --   --   --   --  0.35* 0.33*   ANIONGAP  --   --   --   --  1*  --   --   --   --  4 8   JUANA  --   --   --   --  9.0  --   --   --   --  9.2 9.4   GLC  --   --   --   --  129*  --  100* 116*   < > 139* 152*    < > = values in this interval not displayed.       No results found for this or any previous visit (from the past 24 hour(s)).

## 2021-12-07 NOTE — PROGRESS NOTES
Pt awake, asking for the urinal to void. With my assistance in holding the urinal, pt voided 200 ml clear tomasa urine. Pt continues to state comfort on BIPAP 40%.

## 2021-12-08 NOTE — PROGRESS NOTES
Repeat Hgb 8.1. No active bleeding at this time, No further transfusion indicated at this time. Next HGB recheck at 0700 with daily labs.

## 2021-12-08 NOTE — PROCEDURES
[facility]    Single Lumen PICC Placement    Date/Time: 12/7/2021 10:34 PM  Performed by: Josephine Bañuelos RN  Authorized by: Earl Quiroz MD   Indications: vascular access      UNIVERSAL PROTOCOL   Site Marked: Yes  Prior Images Obtained and Reviewed:  Yes  Required items: Required blood products, implants, devices and special equipment available    Patient identity confirmed:  Verbally with patient, arm band and hospital-assigned identification number  NA - No sedation, light sedation, or local anesthesia  Confirmation Checklist:  Patient's identity using two indicators, relevant allergies, procedure was appropriate and matched the consent or emergent situation and correct equipment/implants were available  Time out: Immediately prior to the procedure a time out was called    Universal Protocol: the Joint Commission Universal Protocol was followed    Preparation: Patient was prepped and draped in usual sterile fashion    ESBL (mL):  2     ANESTHESIA    Anesthesia: Local infiltration  Local Anesthetic:  Lidocaine 1% without epinephrine  Anesthetic Total (mL):  1      SEDATION    Patient Sedated: No        Preparation: skin prepped with 2% chlorhexidine  Skin prep agent: skin prep agent completely dried prior to procedure  Sterile barriers: maximum sterile barriers were used: cap, mask, sterile gown, sterile gloves, and large sterile sheet  Hand hygiene: hand hygiene performed prior to central venous catheter insertion  Type of line used: Power PICC  Catheter type: single lumen  Lumen type: valved  Catheter size: 4 Fr  Brand: [employer]  Lot number: [id number]  Placement method: ultrasound  Number of attempts: 1  Successful placement: yes  Orientation: right  Location: basilic vein  Arm circumference: adults 10 cm  Extremity circumference: 28  Visible catheter length: 0  Internal length: 38 cm  Total catheter length: 38  Dressing and securement: antibiotic disc placed, occlusive  dressing applied and statlock  Post procedure assessment: blood return through all ports, free fluid flow and placement verified by x-ray  PROCEDURE   Patient Tolerance:  Patient tolerated the procedure well with no immediate complicationsDescribe Procedure: Routine PICC placement. Patient has PIV access but PA-C requesting PICC for pressors. Patients daughter at bedside to sign consent. PICC placed without difficulty. Xray confirmation for tip placement. New right PICC in good position with tip in mid SVC- Marcel Merchant radiologist  Patient tolerated procedure well. Educational material left for patient. Primary RN aware PICC is good to use

## 2021-12-08 NOTE — PROGRESS NOTES
Patient awake, Hgb 6.1-order to transfuse  After PA talked to daughter and patient , they decided to receive blood. Respiratory rate elevated ,B/P dropped to 6o's- levo started per daughters request to pursue treating medically but still no intubation.Will follow plan of care.

## 2021-12-08 NOTE — PROGRESS NOTES
Patient continues on same bipap settings 18/10 at 40% FI02, patient still appearing to use abdominal muscles and chest retractions noted. Breathing over fixed rate.Morphing given for comfort. Air mattress placed. Will watch for changes.

## 2021-12-08 NOTE — PROGRESS NOTES
LR Bolus done, blood infusion up to 999/hr, levophed increased. Picc just finished up, waiting on xray confirmation.

## 2021-12-08 NOTE — PROGRESS NOTES
[facility]    Hospitalist Progress Note    Date of Service (when I saw the patient): 12/08/2021    Assessment & Plan   Camacho Covington is a 61 year old male admitted on 12/5/2021. He has end-stage COPD on chronic oxygen 2-3 L/min nasal cannula, he presented with increased shortness of breath and was admitted with a COPD exacerbation and acute hypoxic respiratory failure.     COPD exacerbation  Acute on chronic hypoxic and hypercapnic respiratory failure  The patient is chronically on 2-3 L oxygen via nasal cannula.  PFTs are consistent with very severe obstructive lung disease without reversibility.  FEV1 to FVC ratio was 28%, FEV1 is (0.62L) 19%, and FVC is (2.17L) 62% of predicted.  He is steroid-dependent and chronically on prednisone 5 mg daily.  In the ED, initial VBG 7.23 / 91 / 78/ 38.  After BiPAP, improved to 7.31 / 78 / 56 / 39  The patient is admitted to the intensive care unit due to need for BiPAP and need for close monitoring due to high risk for respiratory decompensation.  Will attempt to keep oxygen saturations between 90 to 94% to avoid carbon dioxide retention.  The patient was treated with azithromycin and ceftriaxone emergency department, but there is no infiltrate on chest x-ray.  Will check procalcitonin and consider discontinuing ceftriaxone and resuming prior to admission thrice weekly azithromycin.  We will continue prior to admission Symbicort and Spiriva.  Will order albuterol nebs every 4 hours while awake.  - Patient hypercapnic early AM 12/6 after being placed on oxymizer and titrated to SaO2 of 100%, ABG 7.24 / 107 / 35 / 46.  Emphasized to nursing the need to maintain sats 90-94%, and no higher to avoid hypercapnic encephalopathy.  The patient noted to be breathing with increased effort this morning, while on BiPAP.  VBG 7.21 / 89 / 23 / 36 on BiPAP 18/8 with 35% FiO2.  Patient met with Palliative Care and wishes to continue current cares, but reaffirms  DNR/DNI.     Upper GI bleed  Acute blood loss anemia  NG placed 12/7 with 1800 ml coffee ground return.  Hgb was 6.1.  Patient transfused 2 units packed RBCs.  - Repeat Hgb 7.9 -> 8.1 -> 7.6  - Aspirin and Lovenox held  - Discussed with General Surgery, patient's respiratory status precludes EGD  - Started on Protonix drip  - Continue to follow serial hemoglobin q 6 hrs    Ileus vs small bowel obstruction  AXR on 12/7 demonstrates dilated loops of small bowel.  NG tube was placed and stared LIS, with immediate return of 1800 ml coffee ground return.  - NG remains on LIS  - Discussed with General Surgery, consult cancelled  - Had a large BM this AM  - AXR 12/8 shows decreased bowel distention    Hyponatremia  Serum sodium was 121 on presentation to the emergency room.  Sodium decreased to 119 this morning.  Will hold saline infusion and check urine sodium and urine osmolality to determine etiology of hyponatremia.  Continue every 6 hour sodium checks.  - U Na 13 and U Osm 535, consistent with prerenal hyponatremia  - Continue IV NS at 100 ml/hr  - Na 132 on 12/7, discontinue q 6 hr Na checks  - Na 133 on 12/8, resolved  - Repeat BMP in AM     Hypertension  Continue prior to admission lisinopril.     Hyperlipidemia  Continue prior to admission atorvastatin.     Pulmonary nodules  Chest CT on 3/25/2021 demonstrates multiple new pulmonary nodules primarily in the left lung concerning for multifocal lung cancer.  The patient is felt to be too high risk for the multiple biopsies needed to establish a diagnosis and rule out malignancy.     Benign prostatic hypertrophy  Continue prior to admission dutasteride and tamsulosin.    Clinically Significant Risk Factors Present on Admission          # Platelet Defect: home medication list includes an antiplatelet medication         Diet: Regular Diet Adult    DVT Prophylaxis: Enoxaparin (Lovenox) SQ  Smith Catheter: Not present  Code Status:  DNR/DNI, confirmed with patient on  admission and again on 12/6.     Disposition: Patient is critically ill and may be terminal.  Prognosis is poor and patient will likely be unable to wean off BiPAP.    Earl Quiroz    Interval History   The patient is resting in bed on BiPAP.  He had large stool earlier this AM.    -Data reviewed today: I reviewed all new labs and imaging results over the last 24 hours. I personally reviewed no images or EKG's today.    Physical Exam   Temp: 98.9  F (37.2  C) Temp src: Axillary BP: 111/64 Pulse: 109   Resp: (!) 43 SpO2: 90 % O2 Device: BiPAP/CPAP    Vitals:    12/06/21 0800 12/07/21 0549 12/08/21 0545   Weight: 63.3 kg (139 lb 8.8 oz) 64.5 kg (142 lb 3.2 oz) 63.7 kg (140 lb 6.9 oz)     Vital Signs with Ranges  Temp:  [97.5  F (36.4  C)-99.1  F (37.3  C)] 98.9  F (37.2  C)  Pulse:  [] 109  Resp:  [20-67] 43  BP: ()/(45-95) 111/64  FiO2 (%):  [35 %-40 %] 40 %  SpO2:  [82 %-98 %] 90 %  I/O last 3 completed shifts:  In: 885 [P.O.:230]  Out: 3025 [Urine:1175; Emesis/NG output:1850]    Gen: Cachectic, chronically ill-appearing male, resting in bed, somnolent, no acute distress, on BiPAP  HEENT: Atraumatic, normocephalic; sclera non-injected, anicterric; oral mucosa moist  Lungs: Prolonged expiration, no wheezes, no rhonchi, no rales  Heart: Regular rate, regular rhythm, no gallops, no rubs, no murmurs  GI: Bowel sound normal, no hepatosplenomegaly, no masses, non-tender, distended, no guarding, no rebound tenderness  Lymph: No lymphadenopathy, no edema  Skin: No rashes, no chronic venous stasis     Medications     norepinephrine 0.09 mcg/kg/min (12/08/21 0722)     pantoprazole (PROTONIX) infusion ADULT/PEDS GREATER than or EQUAL to 45 kg 8 mg/hr (12/08/21 0828)     sodium chloride 100 mL/hr at 12/08/21 0903     sodium chloride 100 mL/hr at 12/08/21 0128       [Held by provider] aspirin  81 mg Oral Daily     atorvastatin  20 mg Oral QAM     azithromycin  250 mg Intravenous Q24H     cefTRIAXone  2 g  Intravenous Q24H     diatrizoate meglumine-sodium  75 mL Per NG tube Once     dutasteride  0.5 mg Oral Daily     [Held by provider] enoxaparin ANTICOAGULANT  40 mg Subcutaneous Q24H     fluticasone-vilanterol  1 puff Inhalation Daily     guaiFENesin  1,200 mg Oral BID     ipratropium - albuterol 0.5 mg/2.5 mg/3 mL  3 mL Nebulization Q4H     [Held by provider] lisinopril  20 mg Oral QAM     methylPREDNISolone  62.5 mg Intravenous Q12H     sodium chloride (PF)  10-40 mL Intracatheter Q7 Days     tamsulosin  0.4 mg Oral At Bedtime     umeclidinium  1 puff Inhalation Daily       Data   Recent Labs   Lab 12/08/21  0650 12/08/21  0348 12/08/21  0107 12/08/21  0102 12/07/21  1759 12/07/21  0541 12/07/21  0012 12/06/21  1306 12/06/21  0612 12/05/21  1240 12/05/21  0517 12/05/21  0050   WBC 20.4*  --   --   --   --   --   --   --  16.2*  --   --  12.1*   HGB 7.6* 8.1*  --  7.9*   < >  --   --   --  10.3*  --   --  10.8*   MCV 92  --   --   --   --   --   --   --  98  --   --  93     --   --   --   --   --   --   --  347  --   --  403   NA  --   --   --  133  --  132* 131*   < > 128*  128*   < > 119* 121*   POTASSIUM  --   --   --  5.1  --   --   --   --  5.1  --  5.0 4.6   CHLORIDE  --   --   --  96  --   --   --   --  86*  --  79* 80*   CO2  --   --   --  32  --   --   --   --  41*  --  36* 33*   BUN  --   --   --  127*  --   --   --   --  23  --  16 12   CR  --   --   --  1.63*  --   --   --   --  0.41*  --  0.35* 0.33*   ANIONGAP  --   --   --  5  --   --   --   --  1*  --  4 8   JUANA  --   --   --  8.1*  --   --   --   --  9.0  --  9.2 9.4   GLC  --   --  136* 132*  --   --   --   --  129*   < > 139* 152*   ALBUMIN  --   --   --  2.2*  --   --   --   --   --   --   --   --    PROTTOTAL  --   --   --  5.0*  --   --   --   --   --   --   --   --    BILITOTAL  --   --   --  0.5  --   --   --   --   --   --   --   --    ALKPHOS  --   --   --  119  --   --   --   --   --   --   --   --    ALT  --   --   --  151*  --    --   --   --   --   --   --   --    AST  --   --   --  165*  --   --   --   --   --   --   --   --     < > = values in this interval not displayed.       Recent Results (from the past 24 hour(s))   XR Abdomen Port 1 View    Narrative    ABDOMEN ONE VIEW PORTABLE  12/7/2021 3:28 PM     HISTORY: NG placement.    COMPARISON: None.       Impression    IMPRESSION: Numerous dilated loops of small bowel are seen through the  mid to upper abdomen. No free air. Enteric tube is coiled over the  proximal stomach. No significant bony abnormalities.    RATNA HERNANDEZ MD         SYSTEM ID:  [id number]   XR Chest Port 1 View    Narrative    EXAM: XR CHEST PORT 1 VIEW  LOCATION: [facility]  DATE/TIME: 12/7/2021 8:58 PM    INDICATION: PICC placement  COMPARISON: 12/5/2021      Impression    IMPRESSION: New right PICC line in good position, tip mid SVC. NG tube also present with its tip at least to the level of the GE junction. Inferior most lung base is not included on this exam.  Lungs hyperinflated. Moderate bilateral central interstitial prominence slightly more pronounced on current study. No definite focal pneumonia. Heart size normal.   Abdomen 1 View Port    Narrative    XR ABDOMEN PORT 1 VIEWS 12/8/2021 9:43 AM    HISTORY: Follow-up of bowel obstruction versus ileus.    COMPARISON: 12/7/2021.      Impression    IMPRESSION: A single upright view of the abdomen shows overall  slightly less gaseous distention of small bowel compared to yesterday.  An NG tube remains in the stomach.     EMEKA PABON MD         SYSTEM ID:  [id number]

## 2021-12-08 NOTE — PROGRESS NOTES
Consult received last night.  I will see the patient after surgery this morning (early afternoon).    Efrain Waller MD FACS

## 2021-12-08 NOTE — PROGRESS NOTES
Picc stat service here, will attempt single lumen in right arm. Unable to have BP cuff on left due to PIV( 2) running the rest of LR bolus and levophed in left AC and 1st unit of blood in left forearm IV. BP cuff on left shin, doubt accuracy of reading. Levophed increased, continue to monitor closely and titrate. Daughter in  Room she is supportive and helpful.

## 2021-12-08 NOTE — PROGRESS NOTES
Hgb 7.9 post 2 units PRBC's. Tolerating Bipap no changes in settings, maintaining o2 sats. RR 30's shallow, abdominal breathing. He had a very large soft brown stool. NG output seems to be lighter green brown, output has slowed. Patient starting to make urine, its tea colored, creatinine is elevated. IV NS at 100/hr Levophed continues, titrating for BP Map.60. Will recheck Hgb at 0400 per Tele ICU order.

## 2021-12-08 NOTE — PROGRESS NOTES
2nd unit of PRBC's is 2/3 done. BP increased with titration of Levophed. HR low 100's, Tolerating Bipap18/10, 40%,sat low 90's, RR continues to be high and shallow, patient has coarse lungs, unable to cough up mucus when bipap mask is off for mouth care. NG draining coffee ground returns, lighter color, thinner consistency. Protonix gtt running. Making some dark tea colored urine.,

## 2021-12-08 NOTE — PROGRESS NOTES
"[facility] PALLIATIVE CARE PROGRESS NOTE        Chief Complaint: dyspnea;     Key Palliative Symptom Data:  # Pain severity the last 12 hours: none  # Dyspnea severity the last 12 hours: low  # Nausea severity the last 12 hours: none  # Anxiety severity the last 12 hours: none    Patient is on opioids: assessed and bowels ok/no needed changes to plan of care today.    Oral Morphine Equivalent Dose:  15 mg OME    Prognosis, Goals, or Advance Care Planning was addressed today with: Yes.    Mood, coping, and/or meaning in the context of serious illness were addressed today: No.    Chart Review/discussion with clinical unit members: Dr. Quiroz    Palliative Encounter Summary/Comments:  I saw Camacho in his ICU room this morning.  He was awake and alert.  He was lying still in his bed with his neck somewhat extended.  He was using some of his abdominal muscles and SCMs with inspiration.  He had an NG present and a reasonable seal around it with his BiPAP mask.    When I spoke with Camacho he wasn't as communicative as yesterday but was alert and looked me in the eye.  I gave him my commitment that if the team was worried we are prolonging his death without a chance for him to be restored to his old level of health that we'd let him know that and then we'd talk about what he'd want to do.  He teared up a little at this and then nodded his head.    Through head nods and shakes I did complete a palliative ROS.    I then called and spoke with his daughter, Emelyn.  She was present last night.  She said when she asked him what he might want done he kept saying, \"I don't know.\"  Emelyn understands her father has taken a turn for the worse and that his prognosis is very poor.  She would like to continue current cares until we determine if he is recovering from the GIB and possible SBO.  She knows he is not a surgical candidate.    I did discuss visiting policy if they elected for comfort care and she was glad to know it.  I " committed to calling her tomorrow.      TTS: I have personally spent a total of 22 minutes  today on the unit in review of medical record, consultation with the medical providers and assessment of patient today, with more than 50% of this time spent in counseling about goals of care, coordination of care, and conversation with daughter Emelyn in a phone call from [phone] re: diagnostic results, prognosis, symptom management, risks and benefits of management options, emotional support and development of plan of care.  Total care time today was 38 minutes.    Sabas Martinez MD MS FAAFP  [facility] [place] [facility]  Office [phone]  Fax [phone]

## 2021-12-09 NOTE — PROGRESS NOTES
Per Dr. Rosetta sheets to clamp the OG tube and start patient on clear liquids. Writer attempted to try pt on 15 Lpm oxymask and pt became sob quickly and O2 sats dropped into the low 80's. Put pt back on Bipap at 40%.

## 2021-12-09 NOTE — PROGRESS NOTES
Patient is alert and oriented times 3, the Bipap mask is popped and he c/o being very thirsty. His mouth very dry from constant bipap. Allowed him to have some ice chips as the NG is to LIS and  Patient denies nausea. Its difficult to understand him with dry mouth and we agreed a marker board would be a good idea to communicate. O2 sat down to 75% while off o2 for a few minutes. Levophed gtt continues, will titrate for Map over 60. HR tachy low 100's. Temp 98.9 ax. Pt. Denies pain, Had Morphine at 1900. Lungs diminished throughout, some coarseness in anterior upper airway he is not able to take an deep breath and expectorate. Smith draining large amounts of light clear tea colored urine. Patient is on first step mattress, we turned him as well. Preventative mepelex to coccyx. Bed alarm is on for safety.

## 2021-12-09 NOTE — PROGRESS NOTES
[facility]    Hospitalist Progress Note    Date of Service (when I saw the patient): 12/09/2021    Assessment & Plan   Camacho Covington is a 61 year old male admitted on 12/5/2021. He has end-stage COPD on chronic oxygen 2-3 L/min nasal cannula, he presented with increased shortness of breath and was admitted with a COPD exacerbation and acute hypoxic respiratory failure.     COPD exacerbation  Acute on chronic hypoxic and hypercapnic respiratory failure    The patient is chronically on 2-3 L oxygen via nasal cannula.  PFTs are consistent with very severe obstructive lung disease without reversibility.  FEV1 to FVC ratio was 28%, FEV1 is (0.62L) 19%, and FVC is (2.17L) 62% of predicted.  He is steroid-dependent and chronically on prednisone 5 mg daily.  In the ED, initial VBG 7.23 / 91 / 78/ 38.  After BiPAP, improved to 7.31 / 78 / 56 / 39  The patient is admitted to the intensive care unit due to need for BiPAP and need for close monitoring due to high risk for respiratory decompensation.  Will attempt to keep oxygen saturations between 90 to 94% to avoid carbon dioxide retention.  The patient was treated with azithromycin and ceftriaxone emergency department, but there is no infiltrate on chest x-ray.  Will check procalcitonin and consider discontinuing ceftriaxone and resuming prior to admission thrice weekly azithromycin.  We will continue prior to admission Symbicort and Spiriva.  Will order albuterol nebs every 4 hours while awake.    Patient hypercapnic early AM 12/6 after being placed on oxymizer and titrated to SaO2 of 100%, ABG 7.24 / 107 / 35 / 46.  Emphasized to nursing the need to maintain sats 90-94%, and no higher to avoid hypercapnic encephalopathy.  The patient noted to be breathing with increased effort this morning, while on BiPAP.  VBG 7.21 / 89 / 23 / 36 on BiPAP 18/8 with 35% FiO2.  Patient met with Palliative Care and wishes to continue current cares, but reaffirms  DNR/DNI.    On oxymizer 6 lpm which is much improved.    - no further BiPAP   - stable for med surg     Upper GI bleed  Acute blood loss anemia  NG placed 12/7 with 1800 ml coffee ground return.  Hgb was 6.1.  Patient transfused 2 units packed RBCs.    Repeat Hgb 7.9 -> 8.1 -> 7.6 -> -> 7.7. Stable. Not a good EGD candidate due to respiratory status. Tolerated clear liquid diet. Residual 100 after diet so does not appear obstructed.   - Aspirin and Lovenox held   - recheck hemoglobin tomorrow   - stop pantoprazole infusion and start BID pantoprazole oral    - pull NG tube     Ileus vs small bowel obstruction    AXR on 12/7 demonstrates dilated loops of small bowel.  NG tube was placed and stared LIS, with immediate return of 1800 ml coffee ground return.  - NG remains on LIS  - Discussed with General Surgery, consult cancelled  - Had a large BM this AM  - AXR 12/8 shows decreased bowel distention    Hyponatremia    Serum sodium was 121 on presentation to the emergency room.  Sodium decreased to 119 this morning.  Will hold saline infusion and check urine sodium and urine osmolality to determine etiology of hyponatremia.  Continue every 6 hour sodium checks.  - U Na 13 and U Osm 535, consistent with prerenal hyponatremia  - Continue IV NS at 100 ml/hr  - Na 132 on 12/7, discontinue q 6 hr Na checks  - Na 133 on 12/8, resolved  - Repeat BMP in AM     Hypertension  Continue prior to admission lisinopril.     Hyperlipidemia  Continue prior to admission atorvastatin.     Pulmonary nodules  Chest CT on 3/25/2021 demonstrates multiple new pulmonary nodules primarily in the left lung concerning for multifocal lung cancer.  The patient is felt to be too high risk for the multiple biopsies needed to establish a diagnosis and rule out malignancy.     Benign prostatic hypertrophy  Continue prior to admission dutasteride and tamsulosin.    Clinically Significant Risk Factors Present on Admission          # Platelet Defect: home  medication list includes an antiplatelet medication         Diet: advance to Regular Diet Adult    DVT Prophylaxis: holding Enoxaparin (Lovenox) due to suspected GI bleeding  Smith Catheter: Not present  Code Status:  DNR/DNI, confirmed with patient on admission and again on 12/6.         Discussion: Significant improvement in oxygenation, now off BiPAP. Stable for med/surg    Disposition Plan   Expected Discharge: 12/15/2021     Anticipated discharge location:  Awaiting care coordination huddle  Delays:     Oxygen Needs         Attestation:  Total time: 40 minutes    Constantine Sargent MD  [facility]        Interval History   Breathing feels better. Off BiPAP and on oxymizer.     Physical Exam   Temp:  [97.5  F (36.4  C)-98.8  F (37.1  C)] 98.1  F (36.7  C)  Pulse:  [] 107  Resp:  [15-94] 20  BP: (104-167)/() 167/83  FiO2 (%):  [40 %] 40 %  SpO2:  [86 %-99 %] 99 %    Weights:   Vitals:    12/07/21 0549 12/08/21 0545 12/09/21 0600   Weight: 64.5 kg (142 lb 3.2 oz) 63.7 kg (140 lb 6.9 oz) 67.5 kg (148 lb 13 oz)    Body mass index is 23.31 kg/m .    Constitutional: alert and oriented, weak appearing and mild increased work of breathing, nontoxic  CV: Regular, no edema  Respiratory: decreased breath sounds throughout, no wheezes  GI: Soft, non-tender, bowel sounds active  Skin: Warm and dry    Venous Blood Gas  Recent Labs   Lab 12/07/21  0828 12/06/21  0838 12/06/21  0612 12/05/21  0515   PHV 7.21* 7.36 7.24* 7.31*   PCO2V 89* 81* 107* 78*   PO2V 23* 61* 35 56*   HCO3V 36* 45* 46* 39*   MARÍA ELENA 7.0* 16.6* 14.6* 10.5*   O2PER 35 40 35 50      Data   Recent Labs   Lab 12/09/21  1159 12/09/21  0623 12/09/21  0036 12/08/21  1912 12/08/21  1517 12/08/21  0650 12/08/21  0348 12/08/21  0107 12/08/21  0102 12/07/21  1759 12/07/21  0541 12/06/21  1306 12/06/21  0612   WBC 14.9*  --   --   --   --  20.4*  --   --   --   --   --   --  16.2*   HGB 7.7* 7.6*  --  8.0*   < > 7.6*   < >  --  7.9*   < >  --   --  10.3*   MCV  95  --   --   --   --  92  --   --   --   --   --   --  98     --   --   --   --  253  --   --   --   --   --   --  347   *  --   --   --   --   --   --   --  133  --  132*   < > 128*  128*   POTASSIUM 4.5  --   --   --   --   --   --   --  5.1  --   --   --  5.1   CHLORIDE 115*  --   --   --   --   --   --   --  96  --   --   --  86*   CO2 39*  --   --   --   --   --   --   --  32  --   --   --  41*   BUN 35*  --   --   --   --   --   --   --  127*  --   --   --  23   CR 0.43*  --   --   --   --   --   --   --  1.63*  --   --   --  0.41*   ANIONGAP 1*  --   --   --   --   --   --   --  5  --   --   --  1*   JUANA 9.1  --   --   --   --   --   --   --  8.1*  --   --   --  9.0   *  --  144*  --   --   --   --  136* 132*  --   --   --  129*   ALBUMIN  --   --   --   --   --   --   --   --  2.2*  --   --   --   --    PROTTOTAL  --   --   --   --   --   --   --   --  5.0*  --   --   --   --    BILITOTAL  --   --   --   --   --   --   --   --  0.5  --   --   --   --    ALKPHOS  --   --   --   --   --   --   --   --  119  --   --   --   --    ALT  --   --   --   --   --   --   --   --  151*  --   --   --   --    AST  --   --   --   --   --   --   --   --  165*  --   --   --   --     < > = values in this interval not displayed.       Recent Labs   Lab 12/09/21  1159 12/09/21  0036 12/08/21  0107 12/08/21  0102 12/06/21  0612 12/05/21  2348   * 144* 136* 132* 129* 100*        Unresulted Labs Ordered in the Past 30 Days of this Admission     No orders found from 11/5/2021 to 12/6/2021.           Imaging: No results found for this or any previous visit (from the past 24 hour(s)).     I reviewed all new labs and imaging results over the last 24 hours. I personally reviewed no images or EKG's today.    Medications     norepinephrine Stopped (12/09/21 0120)     pantoprazole (PROTONIX) infusion ADULT/PEDS GREATER than or EQUAL to 45 kg 8 mg/hr (12/09/21 1525)     sodium chloride 100 mL/hr at 12/08/21  0128       [Held by provider] aspirin  81 mg Oral Daily     atorvastatin  20 mg Oral QAM     cefTRIAXone  2 g Intravenous Q24H     diatrizoate meglumine-sodium  75 mL Per NG tube Once     dutasteride  0.5 mg Oral Daily     [Held by provider] enoxaparin ANTICOAGULANT  40 mg Subcutaneous Q24H     fluticasone-vilanterol  1 puff Inhalation Daily     guaiFENesin  1,200 mg Oral BID     ipratropium - albuterol 0.5 mg/2.5 mg/3 mL  3 mL Nebulization Q4H     [Held by provider] lisinopril  20 mg Oral QAM     methylPREDNISolone  62.5 mg Intravenous Q12H     sodium chloride (PF)  10-40 mL Intracatheter Q7 Days     tamsulosin  0.4 mg Oral At Bedtime     umeclidinium  1 puff Inhalation Daily   Reviewed meds    Constantine Sargent MD  [facility]

## 2021-12-09 NOTE — PROGRESS NOTES
Patient is alert and oriented times 3, he is able to use the call light for needs. His main complaint is thirst. He ramón on Bipap 18/10 20 40%. He overbreathes the bipap to 28-30. Lungs are diminished, he has some upper airway anterior scattered rhochi/mucus. He has a weak cough, non productive. Heart rate has been low 100's, BP wnl, levo off during the night. Afebrile. Protonix drip continues. No signs of active bleedBS hypo. NG to LIS, the returns are clearish light green, he has been getting ice chips, denies nausea. Passing flatus, but no stool this shift. Smith draining clear tomasa tea colored urine. Morphine for general discomfort twice in last 12 hours. HGB this morning is 7.6. Extra green tube collected for add on labs. Bed alarm is on for safety.

## 2021-12-09 NOTE — PROGRESS NOTES
"[facility] PALLIATIVE CARE PROGRESS NOTE        Chief Complaint:  dyspnea    Key Palliative Symptom Data:  # Pain severity the last 12 hours: none  # Dyspnea severity the last 12 hours: moderate  # Nausea severity the last 12 hours: none  # Anxiety severity the last 12 hours: none  Shahzads dyspnea occurs when he is off the BiPAP and it resolves pretty quickly when he is back on it.    Patient is on opioids: assessed and bowels ok/no needed changes to plan of care today.    Oral Morphine Equivalent Dose:  Yesterday Camacho rec'd #4-1 mg morphine injections for an OME of 20 mg    Prognosis, Goals, or Advance Care Planning was addressed today with: No.    Mood, coping, and/or meaning in the context of serious illness were addressed today: Yes.    Chart Review/discussion with clinical unit members: Dr. Sargent    Palliative Encounter Summary/Comments:  I saw Camacho in his room in the ICU.  He was pretty drowsy so I didn't linger.  He was able to do head nods and shakes for his palliative ROS and at one point said, \"I don't think I'm any worse today.\"  When I asked if we should keep 'doing what we're doing' he gave me a thumbs up.    I spoke with Camacho's daughter Emelyn this afternoon.  She says she is doing OK and was pleased to hear we're clamping the NG and offering him some clear liquids.  I also reviewed that his FiO2 has stayed the same and his O2 sat is slightly better.  She related that she finally reached Camacho's wife last night and urged her to come for a visit.  Camacho's wife is struggling with alcohol use and this is near the anniversary of her mom's death so it is a time of struggle for her whether or not he is sick.  She keeps saying she wants him to return home.  Emelyn has assured her that is everyone's goal AND that Camacho is critically ill and has had a couple of serious setbacks this week. Emelyn feels she is getting good emotional support from her  and the ICU team.     TTS: I have personally spent " a total of 17 minutes  today on the unit in review of medical record, consultation with the medical providers and assessment of patient today, with more than 50% of this time spent in counseling, coordination of care, and conversation with daughter by phone  re: diagnostic results, prognosis, symptom management, risks and benefits of management options, emotional support and development of plan of care.    Sabas Martinez MD MS FAAFP  [facility]  Office [phone]  Fax [phone]

## 2021-12-10 NOTE — PROGRESS NOTES
"CLINICAL NUTRITION SERVICES - ASSESSMENT NOTE     Nutrition Prescription    RECOMMENDATIONS FOR MDs/PROVIDERS TO ORDER:  Recommend daily MVITM due to minimal oral intake. RD to order, but MD to sign per staff recommendation.    Malnutrition Status:    at least Moderate malnutrition in the context of acute on chronic illness.    Recommendations already ordered by Registered Dietitian (RD):  -Ensure Clear Apple or Mixed Berry TID w/ meals.  -Daily weights to assess trends.    Future/Additional Recommendations:  -Continue to monitor and encourage oral intake.  -Add/adjust ONS pending intake and tolerance.  -RD to attempt in-person visit next week if patient is still admitted.     REASON FOR ASSESSMENT  Camacho Covington is a/an 61 year old male assessed by the dietitian for LOS    NUTRITION HISTORY  -Admitted to the ICU 12/5/21 on BiPAP w/ increased shortness of breath.  -PMH: end-stage COPD on chronic O2 2-3 liters/minute and prednisone, HTN, and pulmonary nodules concerning for cancer.  -Met w/ Palliative care 12/7. Patient is DNR/DNI.  -AXR on 12/7/2021 demonstrated dilated loops of small bowel. NG placed 12/7 w/ 1800 mL coffee ground return. Transfused 2 units packed RBCs. [place] to be poor candidate for EGD d/t tenuous respiratory status. Hgb continues to decline. NG tube removed 12/9.  -Transferred to med/surg floor evening of 12/9. Remains tenuous from a respiratory standpoint per MD note. Currently on 3 LPM Oxymask.    RD off-site today so unable to provide an in-person visit. RD called into patient's room and RN Nila answered. Patient is weak and has a hard time feeding himself and have the energy to do so. We discussed Ensure. He would like the Ensure Clear due to his preference for \"juicy\" beverages.    CURRENT NUTRITION ORDERS  Diet: Orders Placed This Encounter      Advance Diet as Tolerated: Full Liquid Diet; Regular Diet Adult      Intake/Tolerance:  -Poor appetite per MD note today.  -Nutrition Rayo scores " "of 1 and 2 since 12/6. No meals recorded in flowsheets since admit.  -Not eating well per discussion w/ Nila.  -Last BM 12/9.    LABS  Na 153 (H)  K+ 4.4 (WNL)    Hgb 6.5 (L, critical)    MEDICATIONS  Pantoprazole  Prednisone  IVF discontinued this am.    ANTHROPOMETRICS  Height: 170.2 cm (5' 7\")  Most Recent Weight: 67.5 kg (148 lb 13 oz) (wt may not have been zeroed with first step on it)    IBW: 67.3 kg (100% IBW)  BMI: 23.31 kg/m2 Normal BMI  Weight History:   Wt Readings from Last 10 Encounters:   12/09/21 67.5 kg (148 lb 13 oz)   11/04/21 63.5 kg (140 lb)   07/21/21 62.6 kg (138 lb 1.6 oz)   06/09/21 60.8 kg (134 lb)   04/27/21 59.9 kg (132 lb)   04/05/21 61.7 kg (136 lb)   04/03/21 61.7 kg (136 lb)   03/27/21 61.7 kg (136 lb 0.4 oz)   01/05/21 60.7 kg (133 lb 12.8 oz)   10/24/19 58.5 kg (129 lb)   -At baseline, weight appears to fluctuate between 132 and 140 lb.     Current encounter:  Vitals:    12/05/21 0126 12/06/21 0800 12/07/21 0549 12/08/21 0545   Weight: 63.5 kg (140 lb) 63.3 kg (139 lb 8.8 oz) first weight obtained on admit. 64.5 kg (142 lb 3.2 oz) 63.7 kg (140 lb 6.9 oz)    12/09/21 0600   Weight: 67.5 kg (148 lb 13 oz)   Net IO Since Admission: -609.7 mL [12/10/21 1428]. Weight gain throughout admission likely secondary to fluids.      Dosing Weight: 63 kg (lowest weight since admit)    ASSESSED NUTRITION NEEDS  Estimated Energy Needs: 6589-0649+ kcals/day (30 - 35 kcals/kg)  Justification: Increased needs  Estimated Protein Needs: 76-95 grams protein/day (1.2 - 1.5 grams of pro/kg)  Justification: Increased needs  Estimated Fluid Needs:  (1 mL/kcal)   Justification: Maintenance    PHYSICAL FINDINGS  Unable to assess due to RD working off-site.   Per HP: cachectic, bitemporal wasting, generalized muscle atrophy.  Bilateral foot edema, but not specified further.    MALNUTRITION  % Intake: </= 50% for >/= 5 days (severe)  % Weight Loss: None noted  Subcutaneous Fat Loss: Unable to assess, " although suspected.  Muscle Loss: Temporal:  At least mild. Suspect additional muscle loss given information above.   Fluid Accumulation/Edema: Unable to assess  Malnutrition Diagnosis: at least Moderate malnutrition in the context of acute on chronic illness.    NUTRITION DIAGNOSIS  Inadequate oral intake related to poor appetite, tenuous respiratory status, and weakness secondary to end stage COPD as evidenced by minimal oral intake over the last 5 days and RN report.      INTERVENTIONS  Implementation  Nutrition Education: Will be provided if able to meet with patient in-person.     Collaboration with other providers: RN, IDT rounds.  Medical food supplement therapy     Recommend daily MVITM due to minimal oral intake. RD to order, but MD to sign per staff recommendation.    Goals  Patient to consume 50-75% of nutritionally adequate meal trays TID, or the equivalent with supplements/snacks.     Monitoring/Evaluation  Progress toward goals will be monitored and evaluated per protocol.    Inna Sanford RDN, LD  Clinical Dietitian  Office (Monday-Friday): [phone]  Weekend/Holiday Pager: [phone]

## 2021-12-10 NOTE — PROGRESS NOTES
Patient transferred up to [facility] from ICU at approximately 2230.  Patient is placed in room 2407.  Patient is vitally stable.    Mervin Stoddard RN

## 2021-12-10 NOTE — CONSULTS
Care Management Initial Consult    General Information  Assessment completed with: Children, Emelyn  Type of CM/SW Visit: Initial Assessment    Primary Care Provider verified and updated as needed: Yes   Readmission within the last 30 days: no previous admission in last 30 days   Reason for Consult: other (see comments)  Advance Care Planning: Advance Care Planning Reviewed: no concerns identified     General Information Comments: elevated risk    Communication Assessment  Patient's communication style: spoken language (English or Bilingual)    Hearing Difficulty or Deaf: no   Wear Glasses or Blind: no    Cognitive  Cognitive/Neuro/Behavioral: WDL  Level of Consciousness: alert  Arousal Level: opens eyes spontaneously  Orientation: oriented x 4  Mood/Behavior: calm  Best Language: 0 - No aphasia  Speech: spontaneous    Living Environment:   People in home: spouse Amy  Current living Arrangements: mobile home      Able to return to prior arrangements: other (see comments)  Living Arrangement Comments: unknown    Family/Social Support:  Care provided by: child(jose)  Provides care for: no one  Marital Status:   Wife,Children  Amy       Description of Support System: Supportive    Support Assessment: Adequate family and caregiver support,Adequate social supports    Current Resources:   Patient receiving home care services: No     Community Resources: None  Equipment currently used at home: none  Supplies currently used at home: Oxygen Tubing/Supplies    Employment/Financial:  Employment Status:  retired        Financial Concerns: No concerns identified      Lifestyle & Psychosocial Needs:  Social Determinants of Health     Tobacco Use: Medium Risk     Smoking Tobacco Use: Former Smoker     Smokeless Tobacco Use: Never Used   Alcohol Use: Not on file   Financial Resource Strain: Not on file   Food Insecurity: Not on file   Transportation Needs: Not on file   Physical Activity: Not on file   Stress: Not on file    Social Connections: Not on file   Intimate Partner Violence: Not on file   Depression: Not at risk     PHQ-2 Score: 0   Housing Stability: Not on file       Functional Status:  Prior to admission patient needed assistance:   Dependent ADLs:: Bathing- daughter helps with hair washing in kitchen sink  Dependent IADLs:: Independent,   Family assists with Cleaning,Laundry,Shopping at times  Assesssment of Functional Status: Not at baseline with ADL Functioning,Not at baseline with mobility,Not at  functional baseline    Mental Health Status:  Mental Health Status: No Current Concerns       Chemical Dependency Status:  Chemical Dependency Status: No Current Concerns           Values/Beliefs:  Spiritual, Cultural Beliefs, Hinduism Practices, Values that affect care: no               Additional Information:    CTS spoke with patients daughter Emelyn over the phone. Writer introduced self and role and addressed elevated risk consult.     Patient currently lives in a one level mobile home with his wife Amy in [place]. Per daughter, prior to hospitalization the patient did not have any community services.     Daughter states that at baseline patient is fairly independent. His wife does manage his medications using a weekly med box, daughter states that is going well. Daughter states that she regularly assists with washing his hair in the kitchen sink.     Daughter reports that patient is very cautious with his health issues and knows his daily limits. He has an oximeter that he uses frequently. On days he is feeling good he is able to make himself meals and complete daily tasks. Per daughter, at baseline patient takes several rest periods throughout the day when performing daily activities. Daughter provides transportation to appointments.     Care management to follow for discharge planning.     PLAN: LUISANA Higuera RN  Care Management, [facility]  [phone]

## 2021-12-10 NOTE — PLAN OF CARE
Assigned patient from 1900 to 2200    Major Shift Events:  Alert and oriented x4. Vital signs stable. Afebrile. Lisinopril administered. Oxygenating with 3L nasal canula which is his home O2 amount.   Transferred upstairs on 4L NC d/t anticipated oxygen demands r/t activity. Oral morphine administered per patient request for air hunger -see MAR. LS coarse/diminished. Tachypnea intermittently. Urethral catheter patent. Clear liquids tolerated without nausea.     Plan: Transfer to med/surg floor. Continue to monitor and notify MD with concerns.   For vital signs and complete assessments, please see documentation flowsheets.       [facility] TRANSPORT NOTE  Data:   Reason for Transport:  Med/Surg overflow    Camacho Covington was transported from ICU via cart at 2200.  Patient was accompanied by Registered Nurse and Nursing Assistant. Equipment used for transport: Oxygen  Nasal Cannula. Family was aware of reason for transport: yes    Action:  Report: given to Mervin CRAIG    Response:  Patient's condition when transferred off unit was stable.    Jasmyne Hannon RN

## 2021-12-10 NOTE — PROGRESS NOTES
[facility] PALLIATIVE CARE PROGRESS NOTE        Chief Complaint: dyspnea, COPD flare    Key Palliative Symptom Data:  # Pain severity the last 12 hours: none  # Dyspnea severity the last 12 hours: low  # Nausea severity the last 12 hours: none  # Anxiety severity the last 12 hours: none  Camacho has some dyspnea with movement and exertion.    Patient is on opioids: assessed and bowels ok/no needed changes to plan of care today.    Oral Morphine Equivalent Dose: 5 mg OME    Prognosis, Goals, or Advance Care Planning was addressed today with: Yes.    Mood, coping, and/or meaning in the context of serious illness were addressed today: Yes.    Chart Review/discussion with clinical unit members: Dr. Caballero    Palliative Encounter Summary/Comments:  I saw Camacho in his med-surg room. He has his oxymask on with an O2 sat of 91-92%.  He is comfortable at rest and has some dyspnea when he was sitting up to eat breakfast.  He is also thirsty and I helped him drink some water.  Camacho is also trying to minimize his napping so he can get back on track with days and nights.  His breathing was regular with rate around 20 with no use of accessory muscles and no belly breathing. Camacho affirmed for me he definitely would not want to be intubated and mechanically ventilated.  He is not opposed to more BiPAP but would want to discuss it with the physician prior to it being started to have a sense for 'how bad things are.'    I also spoke to Camacho's daughter Emelyn who was surprised to learn this morning he had been moved.  She is also happy that he seems to be improving.  She is also realistic that he is still very ill and he may need BiPAP.  She would appreciate an update call this weekend, if possible.    TTS: I have personally spent a total of 20 minutes  today on the unit in review of medical record, consultation with the medical providers and assessment of patient today, with more than 50% of this time spent in counseling,  coordination of care, and conversation with daughter Emelyn by phone re: diagnostic results, prognosis, symptom management, risks and benefits of management options, emotional support and development of plan of care.    Sabas Martinez MD MS FAAFP  [facility]  Office [phone]  Fax [phone]

## 2021-12-10 NOTE — PROGRESS NOTES
[facility]    Hospitalist Progress Note    Date of Service (when I saw the patient): 12/10/2021    Assessment & Plan   Camacho Covington is a 61 year old male admitted on 12/5/2021. He has end-stage COPD on chronic oxygen 2-3 L/min nasal cannula, and presented with increased shortness of breath.     COPD exacerbation  Acute on chronic hypoxic and hypercapnic respiratory failure    The patient is chronically on 2-3 L oxygen via nasal cannula.  PFTs are consistent with very severe obstructive lung disease without reversibility.  FEV1 to FVC ratio was 28%, FEV1 is (0.62L) 19%, and FVC is (2.17L) 62% of predicted.  He is steroid-dependent and chronically on prednisone 5 mg daily.  In the ED, initial VBG 7.23 / 91 / 78/ 38.  After BiPAP, improved to 7.31 / 78 / 56 / 39  The patient was admitted to the intensive care unit due to need for BiPAP and need for close monitoring due to high risk for respiratory decompensation.  The patient was started on methylprednisolone, initially 125 mg IV every 24 hours, then 62.5 mg IV every 12 hours.  Empiric azithromycin and ceftriaxone were started in the emergency department, though CT chest 12/5/2021 showed no evidence of infiltrate, and procalcitonin was < 0.05.  Azithromycin was completed 12/9/2021; ceftriaxone continues with a 7-day stop date ordered 12/11/2021.   Preadmission Symbicort and Spiriva were continued, as was albuterol nebs every 4 hours while awake.  Methylprednisolone was discontinued in favor of prednisone 60 mg daily 12/9/2021.      Patient showed worsened hypercapnea early AM 12/6/2021, ABG 7.24 / 107 / 35 / 46.  He was seen in consultation by Dr. Martinez, palliative care, 12/7/2021.  The patient expresses desire for restorative therapy as goal of care, though reaffirmed his DO NOT RESUSCITATE/DO NOT INTUBATE status.  BiPAP therapy was continued.  The severity of the acute component of hypercapnia gradually improved, until VBG monitoring  was discontinued 12/7/2021.  Last BiPAP use was 12/9/2021 at 0800.  Since that time, he has been on nasal oxygen at gradually decreasing flow, and was transferred out of the ICU to med/surg the evening of 12/9/2021.    This morning, he was placed on an 8 L/min Oxymizer cannula, on which saturation is 97%.  I am concerned that this degree of hyperoxia is not going to help him, and will need to discuss with RN/RT about using lower flows.    -Decrease nasal oxygen to maintain SpO2 89 to 91%.  I turned him down during my visit to 4 L/min, on which she was 91%  -He has received a 5-day course of ceftriaxone, which is sufficient for treatment of presumed infectious exacerbation of COPD.  I will discontinue ceftriaxone at this point.  -He has completed a 5-day course of azithromycin IV.  We will return him to his thrice weekly azithromycin 250 mg every MWF.  -Decrease prednisone to 40 mg/day.  -Continue Umeclidinium, which is a formulary substitution for tiotropium, along with Brio Ellipta, a formulary substitution for Symbicort.  -Continue albuterol nebulized 2.5 mg ordered every 2 hours as needed.  I will stretch that out to every 4 hours as needed.  -Morphine sulfate concentrated solution 2.5 mg p.o. every 4 hours as needed remains available should dyspnea become more severe.     Upper GI bleed  Acute blood loss anemia  NG placed 12/7/2021 with 1800 ml coffee ground return.  Hgb at the time was 6.1.  Patient transfused 2 units packed RBCs.  Preadmission aspirin has been held, and enoxaparin DVT prophylaxis discontinued.  He was felt to be a poor candidate for EGD due to his tenuous respiratory status.  He was started initially on pantoprazole continuous infusion, which was discontinued 12/9/2021 and pantoprazole 40 mg p.o. twice daily started.  Repeat Hgb 7.9 -> 8.1 -> 7.6 -> -> 7.7 --> 6.5 today.  This 1.2 g decline in Hgb suggests ongoing blood loss.  -Preadmission aspirin remains on hold.  -He is on no medical DVT  prophylaxis.  -We will transfuse 1 unit PRBC today.  There is already a consent in place for transfusion.  -Continue pantoprazole 40 mg p.o. twice daily for now.  However, if hemoglobin continues to decline, will resume IV continuous infusion.    Ileus vs small bowel obstruction    AXR on 12/7/2021 demonstrated dilated loops of small bowel.  NG tube was placed and stared LIS, with immediate return of 1800 ml coffee ground return.  Case was discussed with general surgery, though formal consultation not requested or done.  Metoclopramide 10 mg IV every 6 hours was started.  Abdominal distention improved after NG suction.  Repeat abdominal x-ray 12/8/2021 showed decreased bowel distention.  He had a large BM 12/9/2021.  NG tube was removed 12/9/2021.  -Discontinue metoclopramide.  -Continue full liquid diet as tolerated.    Hyponatremia  Hypernatremia    Serum sodium was 121 on presentation to the emergency room --> 119 the next morning.   Labs: U Na 13 and U Osm 535, consistent with prerenal hyponatremia.  The patient received normal saline at 100 mL/h starting 12/5/2021, which continues.  Sodium was normal as of 12/8/2021.  Sodium continues to increase while on IV normal saline: 155 --> 153 this morning.    -Discontinue normal saline IV.  -Encourage oral free water.  -Repeat sodium in the morning.     Hypertension  Managed prior to admission with lisinopril 20 mg daily, which has been resumed here.  Blood pressure has been normal to mildly elevated here.    -Continue preadmission lisinopril.     Hyperlipidemia  -Continue prior to admission atorvastatin.     Pulmonary nodules  Chest CT on 3/25/2021 demonstrates multiple new pulmonary nodules primarily in the left lung concerning for multifocal lung cancer.  The patient was felt to be too high risk for the multiple biopsies needed to establish a diagnosis and rule out malignancy.  These nodules were again seen on the 12/5/2021 chest CT, some of which have decreased in  size or resolved from the prior study, and others which have been noted to become larger.  A 3-month CT was recommended by radiology in follow-up.  -Consider follow-up CT in 3 months, though the patient would not be a candidate for any diagnostic procedure, or probably any [place] therapy if lung cancer found, due to the severity of his underlying COPD.     Benign prostatic hypertrophy  -Continue prior to admission dutasteride and tamsulosin.        Diet:  Full liquid diet.  DVT Prophylaxis: Holding all medical prophylaxis due to GI bleed.  Smith Catheter: Not present  Code Status:  DNR/DNI, confirmed with patient on admission and again on 12/6/2021.         Discussion: Remains tenuous from a respiratory standpoint, though that is probably unavoidable given the severity of his underlying lung disease.  I think that we need to be a bit more careful about avoiding hyperoxia and keeping tighter control on O2 flows so as not to exacerbate hypercapnia.  Although he is doing okay at present, it is conceivable that he could require some additional NPPV support this hospital stay.    Disposition Plan   Expected Discharge: 12/15/2021     Anticipated discharge location:  Awaiting care coordination huddle    Attestation:  Total time: 40 minutes    [facility]        Interval History   Reports no dyspnea at rest.  He has not been out of bed since yesterday to test exertional dyspnea.  Cough is infrequent, and is nonproductive.  Denies respiratory chest pain, or chest tightness.  Appetite is poor.  He has no swallowing difficulties, nausea, or vomiting.  Last BM was yesterday.    Physical Exam   Temp:  [98  F (36.7  C)-99  F (37.2  C)] (P) 99  F (37.2  C)  Pulse:  [] (P) 82  Resp:  [14-39] (P) 30  BP: (117-167)/(55-94) (P) 123/59  FiO2 (%):  [40 %] 40 %  SpO2:  [90 %-100 %] 97 %    Weights:   Vitals:    12/07/21 0549 12/08/21 0545 12/09/21 0600   Weight: 64.5 kg (142 lb 3.2 oz) 63.7 kg (140 lb  6.9 oz) 67.5 kg (148 lb 13 oz)    Body mass index is 23.31 kg/m .    GENERAL: Slender man, pleasant, lying in bed, appears comfortable.  EYES: Eyes grossly normal to inspection, extraocular movements intact  HENT: Nares patent bilaterally, no discharge.    NECK: Trachea midline, no stridor.   RESP: Uses some neck and abdominal accessory muscles even at rest.  Symmetrically distant breath sounds noted over all fields.  Lungs notable for a few, coarse inspiratory crackles at the left base on inspiration.   Expiration very prolonged and very quiet, but no wheeze.  CV: Regular rate and rhythm, non-tachycardic, distantly heard.  Normal S1 S2, no murmur or extra sound.  No lower extremity edema.  ABDOMEN: Mildly protuberant, soft, non-tender, no guarding.  Liver and spleen not enlarged, no masses palpable.  Bowel sounds positive.  MS: No bony deformities noted.  No red or inflamed joints.  SKIN: Warm and dry, no rashes.  NEURO: Alert, oriented, conversant.  Cranial nerves III - XII grossly intact.  No gross motor or sensory deficits.    : Smith catheter noted.  PSYCH: Calm, alert, conversant.  Able to articulate logical thoughts, no tangential thoughts, no hallucinations or delusions.  Affect flat.        Venous Blood Gas  Recent Labs   Lab 12/07/21  0828 12/06/21  0838 12/06/21  0612 12/05/21  0515   PHV 7.21* 7.36 7.24* 7.31*   PCO2V 89* 81* 107* 78*   PO2V 23* 61* 35 56*   HCO3V 36* 45* 46* 39*   MARÍA ELENA 7.0* 16.6* 14.6* 10.5*   O2PER 35 40 35 50      Data   Recent Labs   Lab 12/10/21  0630 12/09/21  1159 12/09/21  0623 12/09/21  0036 12/08/21  1517 12/08/21  0650 12/08/21  0107 12/08/21  0102   WBC 12.9* 14.9*  --   --   --  20.4*  --   --    HGB 6.5* 7.7* 7.6*  --    < > 7.6*   < > 7.9*    95  --   --   --  92  --   --     254  --   --   --  253  --   --    * 155*  --   --   --   --   --  133   POTASSIUM 4.4 4.5  --   --   --   --   --  5.1   CHLORIDE 112* 115*  --   --   --   --   --  96   CO2 42*  39*  --   --   --   --   --  32   BUN 22 35*  --   --   --   --   --  127*   CR 0.41* 0.43*  --   --   --   --   --  1.63*   ANIONGAP <1* 1*  --   --   --   --   --  5   JUANA 8.4* 9.1  --   --   --   --   --  8.1*   * 145*  --  144*  --   --    < > 132*   ALBUMIN  --   --   --   --   --   --   --  2.2*   PROTTOTAL  --   --   --   --   --   --   --  5.0*   BILITOTAL  --   --   --   --   --   --   --  0.5   ALKPHOS  --   --   --   --   --   --   --  119   ALT  --   --   --   --   --   --   --  151*   AST  --   --   --   --   --   --   --  165*    < > = values in this interval not displayed.       Recent Labs   Lab 12/10/21  0630 12/09/21  1159 12/09/21  0036 12/08/21  0107 12/08/21  0102 12/06/21  0612   * 145* 144* 136* 132* 129*        Unresulted Labs Ordered in the Past 30 Days of this Admission     Date and Time Order Name Status Description    12/10/2021  7:15 AM Prepare red blood cells (unit) Preliminary            Imaging: No results found for this or any previous visit (from the past 24 hour(s)).     I reviewed all new labs and imaging results over the last 24 hours. I personally reviewed no images or EKG's today.    Medications     sodium chloride 100 mL/hr at 12/10/21 0036       [Held by provider] aspirin  81 mg Oral Daily     atorvastatin  20 mg Oral QAM     cefTRIAXone  2 g Intravenous Q24H     diatrizoate meglumine-sodium  75 mL Per NG tube Once     dutasteride  0.5 mg Oral Daily     fluticasone-vilanterol  1 puff Inhalation Daily     guaiFENesin  1,200 mg Oral BID     ipratropium - albuterol 0.5 mg/2.5 mg/3 mL  3 mL Nebulization Q4H     lisinopril  20 mg Oral QAM     pantoprazole  40 mg Oral BID AC     predniSONE  60 mg Oral Daily     sodium chloride (PF)  10-40 mL Intracatheter Q7 Days     tamsulosin  0.4 mg Oral At Bedtime     umeclidinium  1 puff Inhalation Daily   Reviewed meds    Dilip Anguiano [facility]

## 2021-12-10 NOTE — PROGRESS NOTES
S:  Patient inpatient at [facility]    B:  Admitted for Resp failure second to end stage COPD    A:  Patient is awake and oriented x 4 but cannot say more than a few words before becoming too short of breath and exhausted to continue talking  O2 use has fluctuated a lot this NOC shift ranging from needing 4Ltr NC to 10 Ltr oximizer and everything in between  Other vitals stable  Patient is bedfast  Weak  30-40 Resp/min is not uncommon  Barrel chest  Extremely thin  Compromised skin on face from BiPap  Transferred up from ICU, they brought a pulsate mattress up after the transfer, patient should be placed in this once oxygen needs are stabilized and patient is awake for the day    R:  Patient laid in bed through out NOC shift    Mervin Stoddard RN

## 2021-12-11 NOTE — PLAN OF CARE
"Patient A&Ox4. Denies any pain or nausea. Has been 4L of oxygen this evening maintaining between 88-93%.  Has dyspnea with any exertion.  Lungs are congested/ with expiratory wheezes and crackles in bases, patient gets gurgly at times, needs encouragement to cough and try to clear. Tolerated Duonebs. Patient has been tired and weak, refused any more out of bed activity. Turned and repositioned. Fluid encouraged. Saline locked.     /55 (BP Location: Left arm)   Pulse 92   Temp 98.9  F (37.2  C) (Oral)   Resp 16   Ht 1.702 m (5' 7\")   Wt 67.5 kg (148 lb 13 oz)   SpO2 90%   BMI 23.31 kg/m      Sarina Hoffman RN on 12/10/2021 at 10:57 PM    "

## 2021-12-11 NOTE — PLAN OF CARE
Patient has been on 3-4L of oxygen today.  Has dyspnea with any exertion.  Lungs are congested, patient gets gurgly at times, needs encouragement to cough and try to clear.  Sat at side of bed this morning and ate some breakfast, since then has been tired and weak, refused any more out of bed activity.  Turned and repositioned.  Received one unit of blood today for hemoglobin 6.5, recheck of hemoglobin this evening was 8.1.  Denies pain.

## 2021-12-11 NOTE — PROGRESS NOTES
[facility]    Hospitalist Progress Note    Date of Service (when I saw the patient): 12/11/2021    Assessment & Plan   Camacho Covington is a 61 year old male admitted on 12/5/2021. He has end-stage COPD on chronic oxygen 2-3 L/min nasal cannula, and presented with increased shortness of breath.     COPD exacerbation  Acute on chronic hypoxic and hypercapnic respiratory failure    The patient is chronically on 2-3 L oxygen via nasal cannula.  PFTs are consistent with very severe obstructive lung disease without reversibility.  FEV1 to FVC ratio was 28%, FEV1 is (0.62L) 19%, and FVC is (2.17L) 62% of predicted.  He is steroid-dependent and chronically on prednisone 5 mg daily.  In the ED, initial VBG 7.23 / 91 / 78/ 38.  After BiPAP, improved to 7.31 / 78 / 56 / 39  The patient was admitted to the intensive care unit due to need for BiPAP and need for close monitoring due to high risk for respiratory decompensation.  The patient was started on methylprednisolone, initially 125 mg IV every 24 hours, then 62.5 mg IV every 12 hours.  Empiric azithromycin and ceftriaxone were started in the emergency department, though CT chest 12/5/2021 showed no evidence of infiltrate, and procalcitonin was < 0.05.  Azithromycin was completed 12/9/2021; ceftriaxone was discontinued 12/10/2021 after a 5 days course for treatment of COPD exacerbation.   Preadmission Symbicort and Spiriva were continued, as were albuterol nebs every 4 hours while awake.  Methylprednisolone was discontinued in favor of prednisone 60 mg daily 12/9/2021.      Patient showed worsened hypercapnea early AM 12/6/2021, ABG 7.24 / 107 / 35 / 46.  He was seen in consultation by Dr. Martinez, palliative care, 12/7/2021.  The patient expresses desire for restorative therapy as goal of care, though reaffirmed his DO NOT RESUSCITATE/DO NOT INTUBATE status.  BiPAP therapy was continued.  The severity of the acute component of hypercapnia gradually  "improved, until VBG monitoring was discontinued 12/7/2021.  Last BiPAP use was 12/9/2021 at 0800.  Since that time, he has been on nasal oxygen at gradually decreasing flow, and was transferred out of the ICU to med/surg the evening of 12/9/2021.    For the last 24 hours the patient has been on 4 to 5 L/min oxygen per nasal cannula.  He feels no different than yesterday.  He is short of breath even at rest, though remains mostly comfortable.  He has a chronic cough, which causes \"rattling\" in the upper chest, but is rarely able to expectorate any sputum.  He remains afebrile.    -Continue O2 at 4 L/min per nasal cannula, titrate to maintain SpO2 89 to 91%.  -Continue thrice weekly azithromycin 250 mg every MWF.  -Continue prednisone 40 mg/day (first day of this dose 12/10/2021), plan to titrate slowly toward 20 mg daily then reassess.  -Continue Umeclidinium, which is a formulary substitution for tiotropium, along with Brio Ellipta, a formulary substitution for Symbicort.  -Continue albuterol nebulized 2.5 mg ordered every 2 hours as needed.  I will stretch that out to every 4 hours as needed.  -Morphine sulfate concentrated solution 2.5 mg p.o. every 4 hours as needed remains available should dyspnea become more severe.  -The patient is not opposed to resumption of BiPAP support should it be necessary.  However, he is a DO NOT RESUSCITATE/DO NOT INTUBATE status.     Upper GI bleed  Acute blood loss anemia  NG placed 12/7/2021 with 1800 ml coffee ground return.  Hgb at the time was 6.1.  Patient transfused 2 units packed RBCs.  Preadmission aspirin has been held, and enoxaparin DVT prophylaxis discontinued.  He was felt to be a poor candidate for EGD due to his tenuous respiratory status.  He was started initially on pantoprazole continuous infusion, which was discontinued 12/9/2021 and pantoprazole 40 mg p.o. twice daily started.  Repeat Hgb 7.9 -> 8.1 -> 7.6 -> -> 7.7 --> 6.5 on 12/20/2021 --> transfused 1 unit " PRBC 12/10 --> 8.1 --> 7.9 today.   -Preadmission aspirin remains on hold.  -He is on no medical DVT prophylaxis.  -Continue pantoprazole 40 mg p.o. twice daily.  -Follow hemoglobin daily.    Ileus vs small bowel obstruction    AXR on 12/7/2021 demonstrated dilated loops of small bowel.  NG tube was placed and stared LIS, with immediate return of 1800 ml coffee ground return.  Case was discussed with general surgery, though formal consultation not requested or done.  Metoclopramide 10 mg IV every 6 hours was started.  Abdominal distention improved after NG suction.  Repeat abdominal x-ray 12/8/2021 showed decreased bowel distention.  He had a large BM 12/9/2021.  NG tube was removed 12/9/2021.  Metoclopramide was discontinued 12/10/2021.  Abdominal exam is unremarkable today.  Although his appetite is poor, he denies dysphagia, nausea, vomiting, or abdominal pain.  -Watch for recurrence.    Hyponatremia  Hypernatremia    Serum sodium was 121 on presentation to the emergency room --> 119 the next morning.   Labs: U Na 13 and U Osm 535, consistent with prerenal hyponatremia.  The patient received normal saline at 100 mL/h starting 12/5/2021, which continued through 12/10/2021; Na 153 on 12/10/2021 while on IV NaCl.  I stoped 0.9 NaCl on 12/20/2021.  Na this morning 144.  -No additional IV fluids needed.  -Follow sodium.     Hypertension  Managed prior to admission with lisinopril 20 mg daily, which has been resumed here.  Blood pressure has been normal.    -Continue preadmission lisinopril.     Hyperlipidemia  -Continue prior to admission atorvastatin.     Pulmonary nodules  Chest CT on 3/25/2021 demonstrates multiple new pulmonary nodules primarily in the left lung concerning for multifocal lung cancer.  The patient was felt to be too high risk for the multiple biopsies needed to establish a diagnosis and rule out malignancy.  These nodules were again seen on the 12/5/2021 chest CT, some of which have decreased in  size or resolved from the prior study, and others which have been noted to become larger.  A 3-month CT was recommended by radiology in follow-up.  -Consider follow-up CT in 3 months, though the patient would not be a candidate for any diagnostic procedure, or probably any specific therapy if lung cancer found, due to the severity of his underlying COPD.     Benign prostatic hypertrophy  -Continue prior to admission dutasteride and tamsulosin.    Moderate malnutrition in the context of chronic disease  Diagnosis made in RD consultation 12/10/2021.  -Ensure supplements ordered 3 times daily with meals.  -Continue to monitor and encourage oral intake.          Diet:  Full liquid diet, advance to regular diet as tolerated.  Nutritional supplementation with Ensure 3 times daily with meals is underway.  DVT Prophylaxis: Holding all medical prophylaxis due to GI bleed.  Smith Catheter:  In place due to severe dyspnea.  Code Status:  DNR/DNI, confirmed with patient on admission and again on 12/6/2021.       Discussion: Overall respiratory status is stable from yesterday, though he remains tenuous, prone to episodic desaturation and periods of dyspnea and increased work of breathing.  It will remain to be seen how much improvement the patient can demonstrate from his current respiratory status, as his underlying disease is very severe.    Disposition Plan   Expected Discharge: 12/17/2021     Anticipated discharge location: home with family      Attestation:  Total time: 40 minutes    Dilip Caballero [facility]        Interval History   Reports unchanged respiratory symptoms from yesterday.  He has rest dyspnea, which can be episodically worse even at rest.  He continues to cough, which is chronic.  He has a difficult time expectorating any sputum.  He has no respiratory chest pain.  Denies dysphagia, nausea, or vomiting.  He has no abdominal pain.  Denies musculoskeletal pain.  He reports being able to sleep  fairly well here in the hospital.    Physical Exam   Temp:  [97.7  F (36.5  C)-99.1  F (37.3  C)] 99.1  F (37.3  C)  Pulse:  [78-99] 97  Resp:  [16-28] 20  BP: ()/(47-67) 117/57  SpO2:  [88 %-94 %] 89 %    Weights:   Vitals:    12/07/21 0549 12/08/21 0545 12/09/21 0600   Weight: 64.5 kg (142 lb 3.2 oz) 63.7 kg (140 lb 6.9 oz) 67.5 kg (148 lb 13 oz)    Body mass index is 23.31 kg/m .    GENERAL: Slender man, pleasant, lying in bed, appears comfortable.  EYES: Eyes grossly normal to inspection, extraocular movements intact  HENT: Nares patent bilaterally, no discharge.    NECK: Trachea midline, no stridor.   RESP: Uses no accessory muscles at rest.  Symmetrically distant breath sounds noted over all fields.  Lungs notable for a few, coarse inspiratory crackles at the left base on inspiration.   Expiration very prolonged and very quiet, but no wheeze.  CV: Regular rate and rhythm, non-tachycardic, distantly heard.  Normal S1 S2, no murmur or extra sound.  No lower extremity edema.  ABDOMEN: Flat, soft, non-tender, no guarding.  Liver and spleen not enlarged, no masses palpable.  Bowel sounds positive.  MS: No bony deformities noted.  No red or inflamed joints.  SKIN: Warm and dry, no rashes.  NEURO: Alert, oriented, conversant.  Cranial nerves III - XII grossly intact.  No gross motor or sensory deficits.    : Smith catheter noted.  PSYCH: Calm, alert, conversant.  Able to articulate logical thoughts, no tangential thoughts, no hallucinations or delusions.  Affect normal.        Venous Blood Gas  Recent Labs   Lab 12/07/21  0828 12/06/21  0838 12/06/21  0612 12/05/21  0515   PHV 7.21* 7.36 7.24* 7.31*   PCO2V 89* 81* 107* 78*   PO2V 23* 61* 35 56*   HCO3V 36* 45* 46* 39*   MARÍA ELENA 7.0* 16.6* 14.6* 10.5*   O2PER 35 40 35 50      Data   Recent Labs   Lab 12/11/21  0604 12/10/21  1644 12/10/21  0630 12/09/21  1159 12/08/21  0107 12/08/21  0102   WBC 15.9*  --  12.9* 14.9*   < >  --    HGB 7.9* 8.1* 6.5* 7.7*   < >  7.9*     --  100 95   < >  --      --  207 254   < >  --      --  153* 155*  --  133   POTASSIUM 4.5  --  4.4 4.5  --  5.1   CHLORIDE 100  --  112* 115*  --  96   CO2 44*  --  42* 39*  --  32   BUN 13  --  22 35*  --  127*   CR 0.39*  --  0.41* 0.43*  --  1.63*   ANIONGAP <1*  --  <1* 1*  --  5   JUANA 8.3*  --  8.4* 9.1  --  8.1*   *  --  122* 145*   < > 132*   ALBUMIN  --   --   --   --   --  2.2*   PROTTOTAL  --   --   --   --   --  5.0*   BILITOTAL  --   --   --   --   --  0.5   ALKPHOS  --   --   --   --   --  119   ALT  --   --   --   --   --  151*   AST  --   --   --   --   --  165*    < > = values in this interval not displayed.       Recent Labs   Lab 12/11/21  0604 12/10/21  0630 12/09/21  1159 12/09/21  0036 12/08/21  0107 12/08/21  0102   * 122* 145* 144* 136* 132*        Unresulted Labs Ordered in the Past 30 Days of this Admission     No orders found from 11/5/2021 to 12/6/2021.           Imaging: No results found for this or any previous visit (from the past 24 hour(s)).     I reviewed all new labs and imaging results over the last 24 hours. I personally reviewed no images or EKG's today.    Medications       [Held by provider] aspirin  81 mg Oral Daily     atorvastatin  20 mg Oral QAM     azithromycin  250 mg Oral Q Mon Wed Fri AM     diatrizoate meglumine-sodium  75 mL Per NG tube Once     dutasteride  0.5 mg Oral Daily     fluticasone-vilanterol  1 puff Inhalation Daily     guaiFENesin  1,200 mg Oral BID     ipratropium - albuterol 0.5 mg/2.5 mg/3 mL  3 mL Nebulization Q4H     lisinopril  20 mg Oral QAM     multivitamin w/minerals  1 tablet Oral Daily     pantoprazole  40 mg Oral BID AC     predniSONE  40 mg Oral Daily     sodium chloride (PF)  10-40 mL Intracatheter Q7 Days     tamsulosin  0.4 mg Oral At Bedtime     umeclidinium  1 puff Inhalation Daily   Reviewed meds    Dilip Caballero MD  [facility]

## 2021-12-11 NOTE — PLAN OF CARE
"Patient A&Ox4. Denies any pain or nausea. Has been 4-5 L of oxygen overnight maintaining between 88-93%.  Writer contacted telemed and got the ok to slightly increase oxygen above 4 liters if needed but to watch oxygen to make sure not to hyper oxygenate. Has dyspnea with any exertion.  Lungs are congested/ with expiratory wheezes and crackles in bases, patient gets gurgly at times, needs encouragement to cough and try to clear. Tolerated Duonebs, and PRN albuterol. During lung treatments patient's oxygen sats go up to 98% for a bit but shortly after neb patient desats. Patient has been tired and weak, refused any more out of bed activity. Cath patent and draining a clear tomasa urine. Turned and repositioned. Fluid encouraged. Saline locked. Hemoglobin of 7.9 this morning.     /55 (BP Location: Left arm)   Pulse 92   Temp 98.9  F (37.2  C) (Oral)   Resp 16   Ht 1.702 m (5' 7\")   Wt 67.5 kg (148 lb 13 oz)   SpO2 90%   BMI 23.31 kg/m      Sarina Hoffman RN on 12/11/2021 at 3:48 AM    "

## 2021-12-12 NOTE — PLAN OF CARE
"Patient A&Ox4. Denies pain or discomfort. Has been having trouble breathing overnight with resp in the higher 20s. Writer gave PRN doses of Morphine 2.5mg x2 overnight. Patient's breathing appeared to calm down.  Appears comfortable, has been resting this evening. Had one large loose inc black stool. Patient continues to get repositioned throughout the night. Oxygen maintaining 88-94% on 4 liters. Tolerated neb trmts, and slept in between. Fluids encouraged, patient has been thirsty throughout the night. Urine output tomasa and fair. Bicarb of 45 today, and Hemoglobin of 7.7. Hosp updated. Saline locked.    /53   Pulse 86   Temp 99.4  F (37.4  C) (Oral)   Resp 29   Ht 1.702 m (5' 7\")   Wt 67.5 kg (148 lb 13 oz)   SpO2 (!) 89%   BMI 23.31 kg/m      Sarina Hoffman RN on 12/12/2021 at 5:29 AM    "

## 2021-12-12 NOTE — PLAN OF CARE
"Patient A&Ox4. Denies pain or discomfort. Appears comfortable, has been resting this evening. Patient refused to get up to switch out mattress with pulsate mattress. Patient is also not in a lift room. Patient has been repositioned Q-2hrs. throughout th evening. Oxygen maintaining 88-95% on 4 liters. Tolerating nebs and inhalers with oxygenation improvement after administration of each. Smith patent and draining a tomasa colored urine. Fluids encouraged.     /62 (BP Location: Left arm)   Pulse 90   Temp 97.8  F (36.6  C) (Oral)   Resp 28   Ht 1.702 m (5' 7\")   Wt 67.5 kg (148 lb 13 oz)   SpO2 90%   BMI 23.31 kg/m      Sarina Hoffman RN on 12/11/2021 at 11:04 PM    "

## 2021-12-12 NOTE — PLAN OF CARE
Patient continues on 4L of oxygen, O2 sat mostly 88 - 92%.  Prefers oxymizer cannula but sat drops while sleeping, oxymask works better.  Scheduled and as needed nebs given throughout the day, also given liquid oral morphine today x 3 for ease breathing.  Patient not eating, does drink a lot of water and encouraged patient to drink ensure clear supplement as well.  Has had good urine output from sams catheter.  No bowel movement since 12/9.  Attempted to get patient out of bed this evening, he sat on the side of the bed for a few minutes but couldn't go any further so was assisted back to bed.  Needs lift room.

## 2021-12-12 NOTE — PROGRESS NOTES
[facility]    Hospitalist Progress Note    Date of Service (when I saw the patient): 12/12/2021    Assessment & Plan   Camacho Covington is a 61 year old male admitted on 12/5/2021. He has end-stage COPD on chronic oxygen 2-3 L/min nasal cannula, and presented with increased shortness of breath.     COPD exacerbation  Acute on chronic hypoxic and hypercapnic respiratory failure    The patient is chronically on 2-3 L oxygen via nasal cannula.  PFTs are consistent with very severe obstructive lung disease without reversibility.  FEV1 to FVC ratio was 28%, FEV1 is (0.62L) 19%, and FVC is (2.17L) 62% of predicted.  He is steroid-dependent and chronically on prednisone 5 mg daily.  In the ED, initial VBG 7.23 / 91 / 78/ 38.  After BiPAP, improved to 7.31 / 78 / 56 / 39  The patient was admitted to the intensive care unit due to need for BiPAP and need for close monitoring due to high risk for respiratory decompensation.  The patient was started on methylprednisolone, initially 125 mg IV every 24 hours, then 62.5 mg IV every 12 hours.  Empiric azithromycin and ceftriaxone were started in the emergency department, though CT chest 12/5/2021 showed no evidence of infiltrate, and procalcitonin was < 0.05.  Azithromycin was completed 12/9/2021; ceftriaxone was discontinued 12/10/2021 after a 5 days course for treatment of COPD exacerbation.   Preadmission Symbicort and Spiriva were continued, as were albuterol nebs every 4 hours while awake.  Methylprednisolone was discontinued in favor of prednisone 60 mg daily 12/9/2021.      Patient showed worsened hypercapnea early AM 12/6/2021, ABG 7.24 / 107 / 35 / 46.  He was seen in consultation by Dr. Mratinez, palliative care, 12/7/2021.  The patient expresses desire for restorative therapy as goal of care, though reaffirmed his DO NOT RESUSCITATE/DO NOT INTUBATE status.  BiPAP therapy was continued.  The severity of the acute component of hypercapnia gradually  improved, until VBG monitoring was discontinued 12/7/2021.  Last BiPAP use was 12/9/2021 at 0800.  Since that time, he has been on nasal oxygen at variable flow.  He was transferred out of the ICU to med/surg the evening of 12/9/2021.    Today he reports unchanged symptoms from yesterday.  He has rest dyspnea, intermittently severe, which seems to respond well to the use of oral morphine sulfate solution.  He has not felt the need to go back on BiPAP, something he said that he would let us know cough is unchanged.  He finds sputum hard to expectorate.    -Continue O2 at 4 L/min per nasal cannula, titrate to maintain SpO2 89 to 91%.  -Continue thrice weekly azithromycin 250 mg every MWF.  -Continue prednisone 40 mg/day (first day of this dose 12/10/2021), plan to titrate slowly toward 20 mg daily then reassess.  -Continue Umeclidinium, which is a formulary substitution for tiotropium, along with Brio Ellipta, a formulary substitution for Symbicort.  -Continue albuterol nebulized 2.5 mg ordered every 2 hours as needed.  I will stretch that out to every 4 hours as needed.  -Morphine sulfate concentrated solution 2.5 mg p.o. every 4 hours as needed remains available should dyspnea become more severe.  -The patient is not opposed to resumption of BiPAP support should it be necessary.  However, he is a DO NOT RESUSCITATE/DO NOT INTUBATE status.     Upper GI bleed  Acute blood loss anemia  NG placed 12/7/2021 with 1800 ml coffee ground return.  Hgb at the time was 6.1.  Patient transfused 2 units packed RBCs.  Preadmission aspirin has been held, and enoxaparin DVT prophylaxis discontinued.  He was felt to be a poor candidate for EGD due to his tenuous respiratory status.  He was started initially on pantoprazole continuous infusion, which was discontinued 12/9/2021 and pantoprazole 40 mg p.o. twice daily started.  Repeat Hgb 7.9 -> 8.1 -> 7.6 -> -> 7.7 --> 6.5 on 12/20/2021 --> transfused 1 unit PRBC 12/10 --> 8.1 --> 7.9  --> 7.7 today.  He denies abdominal discomfort or any reflux symptoms.  -Preadmission aspirin remains on hold.  -He is on no medical DVT prophylaxis.  -Continue pantoprazole 40 mg p.o. twice daily.  -Follow hemoglobin daily.    Ileus vs small bowel obstruction    AXR on 12/7/2021 demonstrated dilated loops of small bowel.  NG tube was placed and stared LIS, with immediate return of 1800 ml coffee ground return.  Case was discussed with general surgery, though formal consultation not requested or done.  Metoclopramide 10 mg IV every 6 hours was started.  Abdominal distention improved after NG suction.  Repeat abdominal x-ray 12/8/2021 showed decreased bowel distention.  He had a large BM 12/9/2021.  NG tube was removed 12/9/2021.  Metoclopramide was discontinued 12/10/2021.  Abdomen today is protuberant, though not worse than yesterday, and probably better than a few days ago.  Abdomen is soft; he reports no tenderness.  Bowel sounds are infrequent but positive.  -Watch for recurrence.    Hyponatremia  Hypernatremia    Serum sodium was 121 on presentation to the emergency room --> 119 the next morning.   Labs: U Na 13 and U Osm 535, consistent with prerenal hyponatremia.  The patient received normal saline at 100 mL/h starting 12/5/2021, which continued through 12/10/2021; Na 153 on 12/10/2021 while on IV NaCl.  I stopped 0.9 NaCl on 12/20/2021.  Na this morning 139.  -No additional IV fluids needed.  -Follow sodium.    Hyperkalemia  Potassium 5.7 this morning, 12/12/2021.  It had previously been normal.  There have been no medication changes, and renal function remains normal.  Therefore, etiology is unclear.  -I am going to hold lisinopril, at least until repeat potassium tomorrow morning.     Hypertension  Managed prior to admission with lisinopril 20 mg daily, which has been resumed here.  Blood pressure has been low/normal.  We are going to need to hold lisinopril the least for a short time due to new  hyperkalemia 12/12/2021.  -Lisinopril is on hold.  Will add an alternative antihypertensive if we are not able to restart an ACE inhibitor tomorrow.     Hyperlipidemia  -Continue prior to admission atorvastatin.     Pulmonary nodules  Chest CT on 3/25/2021 demonstrates multiple new pulmonary nodules primarily in the left lung concerning for multifocal lung cancer.  The patient was felt to be too high risk for the multiple biopsies needed to establish a diagnosis and rule out malignancy.  These nodules were again seen on the 12/5/2021 chest CT, some of which have decreased in size or resolved from the prior study, and others which have been noted to become larger.  A 3-month CT was recommended by radiology in follow-up.  -Consider follow-up CT in 3 months, though the patient would not be a candidate for any diagnostic procedure, or probably any specific therapy if lung cancer found, due to the severity of his underlying COPD.     Benign prostatic hypertrophy  -Continue prior to admission dutasteride and tamsulosin.    Moderate malnutrition in the context of chronic disease  Diagnosis made in RD consultation 12/10/2021.  -Ensure supplements ordered 3 times daily with meals.  -Continue to monitor and encourage oral intake.          Diet:  Full liquid diet, advance to regular diet as tolerated.  Nutritional supplementation with Ensure 3 times daily with meals is underway.  DVT Prophylaxis: Holding all medical prophylaxis due to GI bleed.  Smith Catheter:  In place due to severe dyspnea.  Code Status:  DNR/DNI, confirmed with patient on admission and again on 12/6/2021.       Discussion: Although stable, he has not shown much evidence of recovery from respiratory failure over the last few days.  He continues to respond well to intermittent doses of oral morphine sulfate solution for the relief of episodically severe dyspnea.  He has not required BiPAP support, though would be agreeable to it if needed.  Hemoglobin  continues to slowly drift down, suggesting that there may be some ongoing upper GI blood loss despite pantoprazole 40 mg p.o. twice daily.  We will continue to follow hemoglobin closely.    Disposition Plan   Expected Discharge: 12/18/2021     Anticipated discharge location: home with family      Attestation:  Total time: 35 minutes    [facility]        Interval History   Reports unchanged respiratory symptoms from yesterday.  He is generally comfortable at rest, though has episodic severe dyspnea which responds well to morphine sulfate solution cough persists; he finds it difficult to expectorate any sputum.  He has no chest pain.  He denies abdominal pain or reflux symptoms.  He was incontinent of stool this morning, but has not had diarrhea.    Physical Exam   Temp:  [97.8  F (36.6  C)-99.4  F (37.4  C)] 97.8  F (36.6  C)  Pulse:  [82-97] 96  Resp:  [18-29] 24  BP: (101-120)/(52-62) 117/59  SpO2:  [86 %-96 %] 89 %    Weights:   Vitals:    12/07/21 0549 12/08/21 0545 12/09/21 0600   Weight: 64.5 kg (142 lb 3.2 oz) 63.7 kg (140 lb 6.9 oz) 67.5 kg (148 lb 13 oz)    Body mass index is 23.31 kg/m .    GENERAL: Slender man, pleasant, lying in bed, appears comfortable.  EYES: Eyes grossly normal to inspection, extraocular movements intact  HENT: Nares patent bilaterally, no discharge.    NECK: Trachea midline, no stridor.   RESP: Uses no accessory muscles at rest.  Symmetrically distant breath sounds noted over all fields.  Lungs notable for a few, dependent inspiratory crackles bilaterally on inspiration.   Expiration very prolonged and very quiet, but no wheeze.  CV: Regular rate and rhythm, non-tachycardic, distantly heard.  Normal S1 S2, no murmur or extra sound.  No lower extremity edema.  ABDOMEN: Protuberant, soft, non-tender, no guarding.  Liver and spleen not enlarged, no masses palpable.  Bowel sounds positive.  MS: No bony deformities noted.  No red or inflamed joints.  SKIN:  Warm and dry, no rashes.  NEURO: Alert, oriented, conversant.  Cranial nerves III - XII grossly intact.  No gross motor or sensory deficits.    : Smith catheter noted.  PSYCH: Calm, alert, conversant.  Able to articulate logical thoughts, no tangential thoughts, no hallucinations or delusions.  Affect normal.        Venous Blood Gas  Recent Labs   Lab 12/07/21  0828 12/06/21  0838 12/06/21  0612   PHV 7.21* 7.36 7.24*   PCO2V 89* 81* 107*   PO2V 23* 61* 35   HCO3V 36* 45* 46*   MARÍA ELENA 7.0* 16.6* 14.6*   O2PER 35 40 35      Data   Recent Labs   Lab 12/12/21  0503 12/11/21  0604 12/10/21  1644 12/10/21  0630 12/08/21  0107 12/08/21  0102   WBC 17.7* 15.9*  --  12.9*   < >  --    HGB 7.7* 7.9* 8.1* 6.5*   < > 7.9*   MCV 99 100  --  100   < >  --     230  --  207   < >  --     144  --  153*   < > 133   POTASSIUM 5.7* 4.5  --  4.4   < > 5.1   CHLORIDE 95 100  --  112*   < > 96   CO2 >45* 44*  --  42*   < > 32   BUN 13 13  --  22   < > 127*   CR 0.40* 0.39*  --  0.41*   < > 1.63*   ANIONGAP <1* <1*  --  <1*   < > 5   JUANA 6.7* 8.3*  --  8.4*   < > 8.1*   * 148*  --  122*   < > 132*   ALBUMIN  --   --   --   --   --  2.2*   PROTTOTAL  --   --   --   --   --  5.0*   BILITOTAL  --   --   --   --   --  0.5   ALKPHOS  --   --   --   --   --  119   ALT  --   --   --   --   --  151*   AST  --   --   --   --   --  165*    < > = values in this interval not displayed.       Recent Labs   Lab 12/12/21  0503 12/11/21  0604 12/10/21  0630 12/09/21  1159 12/09/21  0036 12/08/21  0107   * 148* 122* 145* 144* 136*        Unresulted Labs Ordered in the Past 30 Days of this Admission     No orders found from 11/5/2021 to 12/6/2021.           Imaging: No results found for this or any previous visit (from the past 24 hour(s)).     I reviewed all new labs and imaging results over the last 24 hours. I personally reviewed no images or EKG's today.    Medications       [Held by provider] aspirin  81 mg Oral Daily      atorvastatin  20 mg Oral QAM     azithromycin  250 mg Oral Q Mon Wed Fri AM     diatrizoate meglumine-sodium  75 mL Per NG tube Once     dutasteride  0.5 mg Oral Daily     guaiFENesin  1,200 mg Oral BID     ipratropium - albuterol 0.5 mg/2.5 mg/3 mL  3 mL Nebulization Q4H     [Held by provider] lisinopril  20 mg Oral QAM     mometasone-formoterol  2 puff Inhalation BID     multivitamin w/minerals  1 tablet Oral Daily     pantoprazole  40 mg Oral BID AC     predniSONE  40 mg Oral Daily     sodium chloride (PF)  10-40 mL Intracatheter Q7 Days     tamsulosin  0.4 mg Oral At Bedtime     tiotropium  2 puff Inhalation Daily   Reviewed meds    Dilip Caballero MD  [facility] Medicine

## 2021-12-13 NOTE — SIGNIFICANT EVENT
"Significant Event Note    Description of event:  The patient requested assistance getting to the bedside commode.  He was assisted to the commode by nursing.  Within a minute of being assisted to the commode, he was noted to be unresponsive and limp.  A \"rapid response\" was called, which I canceled, as the patient is a DO NOT RESUSCITATE status.  He was helped to the floor by one individual, but did strike the floor heavily.  I was there almost immediately, and the patient was without respiratory effort, pulse, or pressure.  Rather than do the death pronouncement while he was on the floor, we lifted him back into the bed, at which time I did the death pronouncement, contained in a separate note.    Plan:  Please see separate note for details about death pronouncement, as well as discharge summary for details about the remainder of this hospital stay.    Discussed with: Nursing staff, and the patient's daughter.    Dilip Caballero MD  [facility] Medicine    "

## 2021-12-13 NOTE — PLAN OF CARE
"Patient is A&Ox4. Denies pain or discomfort. Lung sounds diminished with expiratory wheezes in the bases. Patient has been on five liters via oxymizer cannula. Patient stated preferring the oxymizer cannula over the mask because the mask pushes on the patient's nose. Tolerating oral meds, and neb treatments. Repositioned throughout the evening. Saline locked.     /68   Pulse 100   Temp 98.4  F (36.9  C) (Oral)   Resp 18   Ht 1.702 m (5' 7\")   Wt 67.5 kg (148 lb 13 oz)   SpO2 (!) 88%   BMI 23.31 kg/m      Sarina Hoffman RN on 12/12/2021 at 11:02 PM    "

## 2021-12-13 NOTE — PLAN OF CARE
"Patient is A&Ox4. Denies pain. Has been having some trouble trying to catch a breathe overnight resp up to 28 at times. Patient's breathing calms down when given dose of  oral PRN Morphine. Dose of Morphine given x2 this shift. Patient is tolerating nebs along with PRN albuterol x1, pops up to 98-99% with neb treatment. Patient is currently 5 liters via oxy mask, maintaining 88-95%. Patient repositioned throughout the night. Fluids encouraged. Bicarb above 45 this morning. Hemoglobin of 8.1 this morning. Saline locked.     /78 (BP Location: Left arm)   Pulse 90   Temp 98.6  F (37  C) (Axillary)   Resp 22   Ht 1.702 m (5' 7\")   Wt 67.5 kg (148 lb 13 oz)   SpO2 90%   BMI 23.31 kg/m        Sarina Hoffman RN on 12/13/2021 at 6:47 AM    "

## 2021-12-13 NOTE — PLAN OF CARE
Spoke with Dr. Caballero regarding the potential need for autopsy.  Dr. Caballero does not recommend autopsy given the patient health history and relationship with family.

## 2021-12-13 NOTE — DISCHARGE SUMMARY
[facility]    Death Summary  Hospital Medicine    Date of Admission:  12/5/2021  Date of Death:  12/13/2021     Primary Care     Kris Theo Martin  [address] [place] [id number]      Identification and Chief Compaint:    Camacho Covington is a 61 year old male admitted on 12/5/2021. He has end-stage COPD on chronic oxygen 2-3 L/min nasal cannula, and presented with increased shortness of breath.     Principal Diagnoses    Emphysema, severe  Acute on chronic hypoxemic and hypercapnic respiratory failure secondary to COPD  Exacerbation COPD  Upper gastrointestinal bleed  Acute anemia due to gastrointestinal hemorrhage  Ileus  Hyponatremia  Hypernatremia  Hyperkalemia  Hypertension  Hyperlipidemia  Multiple pulmonary nodules, uncertain significance  Benign prostatic hypertrophy  Moderate malnutrition in the context of chronic disease      Consultations This Hospital Stay   None.    Significant Results and Procedures   Procedures    None.    History of Present Illness   (From H&P) Camacho Covington is a 61 year old male who presents with shortness of breath.     The patient presented emergency department with 3 days of increasing shortness of breath.  He denies fevers or chills.  He denies increased cough or change in sputum production.  The patient is end-stage COPD and is chronically oxygen dependent on 2 to 3 L nasal cannula at home.  He denies any recent illnesses including nasal congestion, runny nose, or sore throat.  He denies nausea, vomiting, or diarrhea.  He denies sick contacts.  Patient called EMS yesterday evening and was found to be 85% on his home 3 L nasal cannula.  EMS treated the patient with nebulizers, Solu-Medrol, magnesium, and IM epinephrine prior to arrival.  On arrival in the emergency department, patient was noted to be breathing at a rate of 50 breaths/min and tripoding.  He initially declined BiPAP for EMS, but patient was agreeable after discussion in the  emergency department.  Patient denies chest pain, chest pressure, palpitations, lightheadedness, or dizziness.  Patient denies syncope, falls, or trauma.  Patient denies melena, hematochezia, constipation, or abdominal pain.  Patient denies dysuria or hematuria.  Patient denies rash, muscle aches, joint pain, or edema.  Patient denies headache, neck pain, or back pain.  Patient denies diplopia, dysarthria, dysphagia, incoordination, focal numbness, or unilateral weakness.       Hospital Course   Camacho Covington was admitted on 12/5/2021.  The following problems were addressed during his hospitalization:    COPD exacerbation  Acute on chronic hypoxic and hypercapnic respiratory failure    The patient is chronically on 2-3 L oxygen via nasal cannula.  PFTs are consistent with very severe obstructive lung disease without reversibility.  FEV1 to FVC ratio was 28%, FEV1 is (0.62L) 19%, and FVC is (2.17L) 62% of predicted.  He is steroid-dependent and chronically on prednisone 5 mg daily.  In the ED, initial VBG 7.23 / 91 / 78/ 38.  After BiPAP, improved to 7.31 / 78 / 56 / 39  The patient was admitted to the intensive care unit due to need for BiPAP and need for close monitoring due to high risk for respiratory decompensation.  The patient was started on methylprednisolone, initially 125 mg IV every 24 hours, then 62.5 mg IV every 12 hours.  Empiric azithromycin and ceftriaxone were started in the emergency department, though CT chest 12/5/2021 showed no evidence of infiltrate, and procalcitonin was < 0.05.  Azithromycin was completed 12/9/2021; ceftriaxone was discontinued 12/10/2021 after a 5 days course for treatment of COPD exacerbation.   Preadmission Symbicort and Spiriva were continued, as were albuterol nebs every 4 hours while awake.  Methylprednisolone was discontinued in favor of prednisone 60 mg daily 12/9/2021.       Patient showed worsened hypercapnea early AM 12/6/2021, ABG 7.24 / 107 / 35 / 46.  He was  "seen in consultation by Dr. Martinez, palliative care, 12/7/2021.  The patient expresses desire for restorative therapy as goal of care, though reaffirmed his DO NOT RESUSCITATE/DO NOT INTUBATE status.  BiPAP therapy was continued.  The severity of the acute component of hypercapnia gradually improved, until VBG monitoring was discontinued 12/7/2021.  Last BiPAP use was 12/9/2021 at 0800.  Since that time, he has been on nasal oxygen at variable flow.  He was transferred out of the ICU to med/surg the evening of 12/9/2021.    While on the MedSurg unit, he failed to make any significant improvement in respiratory status.  He continued to have rest dyspnea, episodically severe, for which she was treated with morphine sulfate oral solution as needed.  Prednisone was continued though at a lower dose, 40 mg daily.  Inhaled therapy was continued.    The patient was seen by Dr. Sabas Martinez, palliative care.  Based upon their discussions, the patient continued to opt for restorative care, which would include resumption of BiPAP if needed.  However, on multiple occasions, the patient advocated for a DO NOT RESUSCITATE/DO NOT INTUBATE status.     I saw him briefly this morning.  He reported unchanged rest dyspnea, and an uneventful night.  However, I could not examine him, as he needed to have a BM.  Nursing came to see him, and placed him on the commode.  Within approximately a minute on the commode, the patient was noted to be unresponsive and limp.  He was helped to the floor by one individual, though did fall heavily.  A \"rapid response\" was called, which I canceled given his DNR/DNI status, though I came immediately.  It was evident when he was on the floor that he was dead.  However, rather than pronounce him while on the floor, we lifted him back into bed.  There, I did the death pronouncement, details of which are contained in a different note.    The patient's daughter, who is from [place], [place], though " whose name I have misplaced, arrived almost simultaneous with the patient's death.  I met with her in the family conference room, and advised her of her father's death.  She was appropriately emotional.  I offered to call her sister, Emelyn, with whom I had spoken earlier this hospital stay.  The sister present will call to notify her sister.     Upper GI bleed  Acute blood loss anemia  NG placed 12/7/2021 with 1800 ml coffee ground return.  Hgb at the time was 6.1.  Patient transfused 2 units packed RBCs.  Preadmission aspirin has been held, and enoxaparin DVT prophylaxis discontinued.  He was felt to be a poor candidate for EGD due to his tenuous respiratory status.  He was started initially on pantoprazole continuous infusion, which was discontinued 12/9/2021 and pantoprazole 40 mg p.o. twice daily started.  Repeat Hgb 7.9 -> 8.1 -> 7.6 -> -> 7.7 --> 6.5 on 12/20/2021 --> transfused 1 unit PRBC 12/10 --> 8.1 --> 7.9 --> 7.7 --> 8.1 today.       Ileus vs small bowel obstruction    AXR on 12/7/2021 demonstrated dilated loops of small bowel.  NG tube was placed and stared LIS, with immediate return of 1800 ml coffee ground return.  Case was discussed with general surgery, though formal consultation not requested or done.  Metoclopramide 10 mg IV every 6 hours was started.  Abdominal distention improved after NG suction.  Repeat abdominal x-ray 12/8/2021 showed decreased bowel distention.  He had a large BM 12/9/2021.  NG tube was removed 12/9/2021.  Metoclopramide was discontinued 12/10/2021.  Abdominal exam and symptoms improved.  He was able to resume an oral diet.    Hyponatremia  Hypernatremia    Serum sodium was 121 on presentation to the emergency room --> 119 the next morning.   Labs: U Na 13 and U Osm 535, consistent with prerenal hyponatremia.  The patient received normal saline at 100 mL/h starting 12/5/2021, which continued through 12/10/2021; Na 153 on 12/10/2021 while on IV NaCl.  I stopped 0.9 NaCl on  12/20/2021.  Na this morning 138.     Hyperkalemia  Potassium 5.7 the morning, 12/12/2021.  It had previously been normal.    Potassium this morning 5.0.     Hypertension  Managed prior to admission with lisinopril 20 mg daily, which has been resumed here.  Blood pressure has been low/normal.  We held lisinopril awaiting improvements in blood pressure.     Pulmonary nodules  Chest CT on 3/25/2021 demonstrates multiple new pulmonary nodules primarily in the left lung concerning for multifocal lung cancer.  The patient was felt to be too high risk for the multiple biopsies needed to establish a diagnosis and rule out malignancy.  These nodules were again seen on the 12/5/2021 chest CT, some of which have decreased in size or resolved from the prior study, and others which have been noted to become larger.  A 3-month CT was recommended by radiology in follow-up.     Benign prostatic hypertrophy  -Continue prior to admission dutasteride and tamsulosin.     Moderate malnutrition in the context of chronic disease  Diagnosis made in RD consultation 12/10/2021.  -Ensure supplements ordered 3 times daily with meals.       I appreciate being able to care for Camacho, and my condolences go out to his family and others who knew and cared for him.    Dilip Caballero MD    [facility]

## 2021-12-13 NOTE — DEATH PRONOUNCEMENT
MD DEATH PRONOUNCEMENT    Called to pronounce Camacho Covington dead.    Physical Exam: Unresponsive to noxious stimuli, Spontaneous respirations absent, Breath sounds absent, Carotid pulse absent, Heart sounds absent and Pupillary light reflex absent    Patient was pronounced dead at 1014, 2021.    Preliminary Cause of Death: Acute on chronic hypoxemic and hypercapnic respiratory failure due to COPD.    Active Problems:    Pulmonary emphysema (H)    Acute on chronic hypercapnic and hypoxemic respiratory failure    Tobacco abuse       Infectious disease present?: NO    Communicable disease present? (examples: HIV, chicken pox, TB, Ebola, CJD) :  NO    Multi-drug resistant organism present? (example: MRSA): NO      Please consider an autopsy if any of the following exist:  NO Unexpected or unexplained death during or following any dental, medical, or surgical diagnostic treatment procedures.   NO Death of mother at or up to seven days after delivery.     NO All  and pediatric deaths.     NO Death where the cause is sufficiently obscure to delay completion of the death certificate.   NO Deaths in which autopsy would confirm a suspected illness/condition that would affect surviving family members or recipients of transplanted organs.     The following deaths must be reported to the 's Office:  NO A death that may be due entirely or in part to any factors other than natural disease (recent surgery, recent trauma, suspected abuse/neglect).   NO A death that may be an accident, suicide, or homicide.     NO Any sudden, unexpected death in which there is no prior history of significant heart disease or any other condition associated with sudden death.   NO A death under suspicious, unusual, or unexpected circumstances.    NO Any death which is apparently due to natural causes but in which the  does not have a personal physician familiar with the patient s medical history, social, or environmental  situation or the circumstances of the terminal event.   NO Any death apparently due to Sudden Infant Death Syndrome.     NO Deaths that occur during, in association with, or as consequences of a diagnostic, therapeutic, or anesthetic procedure.   NO Any death in which a fracture of a major bone has occurred within the past (6) six months.   NO A death of persons note seen by their physician within 120 days of demise.     NO Any death in which the  was an inmate of a public institution or was in the custody of Law Enforcement personnel.   NO  All unexpected deaths of children   NO Solid organ donors   NO Unidentified bodies   NO Deaths of persons whose bodies are to be cremated or otherwise disposed of so that the bodies will later be unavailable for examination;   NO Deaths unattended by a physician outside of a licensed healthcare facility or licensed residential hospice program   NO Deaths occurring within 24 hours of arrival to a health care facility if death is unexpected.    NO Deaths associated with the decedent s employment.   NO Deaths attributed to acts of terrorism.   NO Any death in which there is uncertainty as to whether it is a medical examiner s care should be discussed with the medical investigator.      Discussed with patient's daughter, who arrived for a visit almost simultaneous with his death.    Body disposition: Body released to the  home.    Dilip Caballero MD  [facility]

## 2021-12-13 NOTE — PLAN OF CARE
Patient continues to have significant dyspnea with any exertion.  On 4L of oxygen per oxymizer cannula most of the day, increased for short periods at times when O2 sat drops to 80% with exertion, then returned to 4L when able.  Administering nebs and liquid morphine.  Patient had another large loose black stool today, incontinent.  Attempted to sit at side of bed this evening and was unable due to dyspnea.  Still drinking fluids well, adequate urine output.  Eating small amounts with assistance, jello and pudding.  Turning and repositioning frequently.
